# Patient Record
Sex: FEMALE | Race: WHITE | NOT HISPANIC OR LATINO | Employment: OTHER | URBAN - METROPOLITAN AREA
[De-identification: names, ages, dates, MRNs, and addresses within clinical notes are randomized per-mention and may not be internally consistent; named-entity substitution may affect disease eponyms.]

---

## 2018-01-31 ENCOUNTER — OFFICE VISIT CONVERTED (OUTPATIENT)
Dept: FAMILY MEDICINE CLINIC | Facility: CLINIC | Age: 68
End: 2018-01-31
Attending: FAMILY MEDICINE

## 2018-01-31 ENCOUNTER — CONVERSION ENCOUNTER (OUTPATIENT)
Dept: FAMILY MEDICINE CLINIC | Facility: CLINIC | Age: 68
End: 2018-01-31

## 2018-06-25 ENCOUNTER — CONVERSION ENCOUNTER (OUTPATIENT)
Dept: FAMILY MEDICINE CLINIC | Facility: CLINIC | Age: 68
End: 2018-06-25

## 2018-06-25 ENCOUNTER — OFFICE VISIT CONVERTED (OUTPATIENT)
Dept: FAMILY MEDICINE CLINIC | Facility: CLINIC | Age: 68
End: 2018-06-25
Attending: NURSE PRACTITIONER

## 2018-10-04 ENCOUNTER — CONVERSION ENCOUNTER (OUTPATIENT)
Dept: FAMILY MEDICINE CLINIC | Facility: CLINIC | Age: 68
End: 2018-10-04

## 2018-10-04 ENCOUNTER — OFFICE VISIT CONVERTED (OUTPATIENT)
Dept: FAMILY MEDICINE CLINIC | Facility: CLINIC | Age: 68
End: 2018-10-04
Attending: FAMILY MEDICINE

## 2018-11-28 ENCOUNTER — CONVERSION ENCOUNTER (OUTPATIENT)
Dept: FAMILY MEDICINE CLINIC | Facility: CLINIC | Age: 68
End: 2018-11-28

## 2018-11-28 ENCOUNTER — OFFICE VISIT CONVERTED (OUTPATIENT)
Dept: FAMILY MEDICINE CLINIC | Facility: CLINIC | Age: 68
End: 2018-11-28
Attending: FAMILY MEDICINE

## 2019-03-27 ENCOUNTER — CONVERSION ENCOUNTER (OUTPATIENT)
Dept: FAMILY MEDICINE CLINIC | Facility: CLINIC | Age: 69
End: 2019-03-27

## 2019-03-27 ENCOUNTER — HOSPITAL ENCOUNTER (OUTPATIENT)
Dept: FAMILY MEDICINE CLINIC | Facility: CLINIC | Age: 69
Discharge: HOME | End: 2019-03-27
Attending: FAMILY MEDICINE

## 2019-03-27 ENCOUNTER — OFFICE VISIT CONVERTED (OUTPATIENT)
Dept: FAMILY MEDICINE CLINIC | Facility: CLINIC | Age: 69
End: 2019-03-27
Attending: FAMILY MEDICINE

## 2019-03-27 LAB
EST. AVERAGE GLUCOSE BLD GHB EST-MCNC: 214 MG/DL
HBA1C MFR BLD: 9.1 % (ref 3.5–5.7)

## 2019-06-05 ENCOUNTER — HOSPITAL ENCOUNTER (OUTPATIENT)
Dept: SURGERY | Facility: HOSPITAL | Age: 69
Setting detail: HOSPITAL OUTPATIENT SURGERY
Discharge: HOME | End: 2019-06-05
Attending: OPHTHALMOLOGY

## 2019-06-05 LAB — GLUCOSE BLD-MCNC: 216 MG/DL (ref 65–99)

## 2019-07-10 ENCOUNTER — OFFICE VISIT CONVERTED (OUTPATIENT)
Dept: FAMILY MEDICINE CLINIC | Facility: CLINIC | Age: 69
End: 2019-07-10
Attending: FAMILY MEDICINE

## 2019-07-10 ENCOUNTER — CONVERSION ENCOUNTER (OUTPATIENT)
Dept: FAMILY MEDICINE CLINIC | Facility: CLINIC | Age: 69
End: 2019-07-10

## 2019-07-10 ENCOUNTER — HOSPITAL ENCOUNTER (OUTPATIENT)
Dept: FAMILY MEDICINE CLINIC | Facility: CLINIC | Age: 69
Discharge: HOME | End: 2019-07-10
Attending: FAMILY MEDICINE

## 2019-07-10 LAB
ALBUMIN SERPL-MCNC: 4.3 G/DL (ref 3.5–5)
ALBUMIN/GLOB SERPL: 1 {RATIO} (ref 1.4–2.6)
ALP SERPL-CCNC: 129 U/L (ref 43–160)
ALT SERPL-CCNC: 62 U/L (ref 10–40)
ANION GAP SERPL CALC-SCNC: 21 MMOL/L (ref 8–19)
AST SERPL-CCNC: 66 U/L (ref 15–50)
BASOPHILS # BLD AUTO: 0.08 10*3/UL (ref 0–0.2)
BASOPHILS NFR BLD AUTO: 0.9 % (ref 0–3)
BILIRUB SERPL-MCNC: 0.57 MG/DL (ref 0.2–1.3)
BUN SERPL-MCNC: 8 MG/DL (ref 5–25)
BUN/CREAT SERPL: 9 {RATIO} (ref 6–20)
CALCIUM SERPL-MCNC: 9.9 MG/DL (ref 8.7–10.4)
CHLORIDE SERPL-SCNC: 99 MMOL/L (ref 99–111)
CHOLEST SERPL-MCNC: 201 MG/DL (ref 107–200)
CHOLEST/HDLC SERPL: 4.5 {RATIO} (ref 3–6)
CONV ABS IMM GRAN: 0.03 10*3/UL (ref 0–0.2)
CONV CO2: 20 MMOL/L (ref 22–32)
CONV IMMATURE GRAN: 0.3 % (ref 0–1.8)
CONV TOTAL PROTEIN: 8.5 G/DL (ref 6.3–8.2)
CREAT UR-MCNC: 0.94 MG/DL (ref 0.5–0.9)
DEPRECATED RDW RBC AUTO: 43 FL (ref 36.4–46.3)
EOSINOPHIL # BLD AUTO: 0.14 10*3/UL (ref 0–0.7)
EOSINOPHIL # BLD AUTO: 1.6 % (ref 0–7)
ERYTHROCYTE [DISTWIDTH] IN BLOOD BY AUTOMATED COUNT: 13.2 % (ref 11.7–14.4)
EST. AVERAGE GLUCOSE BLD GHB EST-MCNC: 226 MG/DL
GFR SERPLBLD BASED ON 1.73 SQ M-ARVRAT: >60 ML/MIN/{1.73_M2}
GLOBULIN UR ELPH-MCNC: 4.2 G/DL (ref 2–3.5)
GLUCOSE SERPL-MCNC: 214 MG/DL (ref 65–99)
HBA1C MFR BLD: 13.8 G/DL (ref 12–16)
HBA1C MFR BLD: 9.5 % (ref 3.5–5.7)
HCT VFR BLD AUTO: 44.3 % (ref 37–47)
HDLC SERPL-MCNC: 45 MG/DL (ref 40–60)
LDLC SERPL CALC-MCNC: 127 MG/DL (ref 70–100)
LYMPHOCYTES # BLD AUTO: 2.56 10*3/UL (ref 1–5)
MCH RBC QN AUTO: 27.7 PG (ref 27–31)
MCHC RBC AUTO-ENTMCNC: 31.2 G/DL (ref 33–37)
MCV RBC AUTO: 88.8 FL (ref 81–99)
MONOCYTES # BLD AUTO: 0.66 10*3/UL (ref 0.2–1.2)
MONOCYTES NFR BLD AUTO: 7.4 % (ref 3–10)
NEUTROPHILS # BLD AUTO: 5.41 10*3/UL (ref 2–8)
NEUTROPHILS NFR BLD AUTO: 61 % (ref 30–85)
NRBC CBCN: 0 % (ref 0–0.7)
OSMOLALITY SERPL CALC.SUM OF ELEC: 287 MOSM/KG (ref 273–304)
PLATELET # BLD AUTO: 314 10*3/UL (ref 130–400)
PMV BLD AUTO: 12.6 FL (ref 9.4–12.3)
POTASSIUM SERPL-SCNC: 4.2 MMOL/L (ref 3.5–5.3)
RBC # BLD AUTO: 4.99 10*6/UL (ref 4.2–5.4)
SODIUM SERPL-SCNC: 136 MMOL/L (ref 135–147)
TRIGL SERPL-MCNC: 146 MG/DL (ref 40–150)
TSH SERPL-ACNC: 1.63 M[IU]/L (ref 0.27–4.2)
VARIANT LYMPHS NFR BLD MANUAL: 28.8 % (ref 20–45)
VLDLC SERPL-MCNC: 29 MG/DL (ref 5–37)
WBC # BLD AUTO: 8.88 10*3/UL (ref 4.8–10.8)

## 2019-07-31 ENCOUNTER — HOSPITAL ENCOUNTER (OUTPATIENT)
Dept: CARDIOLOGY | Facility: HOSPITAL | Age: 69
Discharge: HOME | End: 2019-07-31
Attending: FAMILY MEDICINE

## 2019-08-02 ENCOUNTER — HOSPITAL ENCOUNTER (OUTPATIENT)
Dept: CT IMAGING | Facility: HOSPITAL | Age: 69
Discharge: HOME | End: 2019-08-02
Attending: FAMILY MEDICINE

## 2019-08-14 ENCOUNTER — CONVERSION ENCOUNTER (OUTPATIENT)
Dept: FAMILY MEDICINE CLINIC | Facility: CLINIC | Age: 69
End: 2019-08-14

## 2019-08-14 ENCOUNTER — OFFICE VISIT CONVERTED (OUTPATIENT)
Dept: FAMILY MEDICINE CLINIC | Facility: CLINIC | Age: 69
End: 2019-08-14
Attending: FAMILY MEDICINE

## 2019-09-23 ENCOUNTER — PATIENT OUTREACH - CONVERTED (OUTPATIENT)
Dept: FAMILY MEDICINE CLINIC | Facility: CLINIC | Age: 69
End: 2019-09-23
Attending: FAMILY MEDICINE

## 2019-10-03 ENCOUNTER — OFFICE VISIT CONVERTED (OUTPATIENT)
Dept: FAMILY MEDICINE CLINIC | Facility: CLINIC | Age: 69
End: 2019-10-03
Attending: FAMILY MEDICINE

## 2019-10-03 ENCOUNTER — CONVERSION ENCOUNTER (OUTPATIENT)
Dept: FAMILY MEDICINE CLINIC | Facility: CLINIC | Age: 69
End: 2019-10-03

## 2019-10-25 ENCOUNTER — PATIENT OUTREACH - CONVERTED (OUTPATIENT)
Dept: FAMILY MEDICINE CLINIC | Facility: CLINIC | Age: 69
End: 2019-10-25
Attending: FAMILY MEDICINE

## 2019-11-20 ENCOUNTER — HOSPITAL ENCOUNTER (OUTPATIENT)
Dept: NEUROLOGY | Facility: HOSPITAL | Age: 69
Discharge: HOME | End: 2019-11-20
Attending: PSYCHIATRY & NEUROLOGY

## 2019-11-26 ENCOUNTER — PATIENT OUTREACH - CONVERTED (OUTPATIENT)
Dept: FAMILY MEDICINE CLINIC | Facility: CLINIC | Age: 69
End: 2019-11-26
Attending: FAMILY MEDICINE

## 2020-01-24 ENCOUNTER — PATIENT OUTREACH - CONVERTED (OUTPATIENT)
Dept: FAMILY MEDICINE CLINIC | Facility: CLINIC | Age: 70
End: 2020-01-24
Attending: FAMILY MEDICINE

## 2020-02-26 ENCOUNTER — HOSPITAL ENCOUNTER (OUTPATIENT)
Dept: FAMILY MEDICINE CLINIC | Facility: CLINIC | Age: 70
Discharge: HOME | End: 2020-02-26
Attending: FAMILY MEDICINE

## 2020-02-26 ENCOUNTER — OFFICE VISIT CONVERTED (OUTPATIENT)
Dept: FAMILY MEDICINE CLINIC | Facility: CLINIC | Age: 70
End: 2020-02-26
Attending: FAMILY MEDICINE

## 2020-02-26 ENCOUNTER — CONVERSION ENCOUNTER (OUTPATIENT)
Dept: FAMILY MEDICINE CLINIC | Facility: CLINIC | Age: 70
End: 2020-02-26

## 2020-02-26 LAB
ALBUMIN SERPL-MCNC: 4 G/DL (ref 3.5–5)
ALBUMIN/GLOB SERPL: 1 {RATIO} (ref 1.4–2.6)
ALP SERPL-CCNC: 120 U/L (ref 43–160)
ALT SERPL-CCNC: 22 U/L (ref 10–40)
ANION GAP SERPL CALC-SCNC: 20 MMOL/L (ref 8–19)
AST SERPL-CCNC: 24 U/L (ref 15–50)
BASOPHILS # BLD AUTO: 0.05 10*3/UL (ref 0–0.2)
BASOPHILS NFR BLD AUTO: 0.7 % (ref 0–3)
BILIRUB SERPL-MCNC: 0.51 MG/DL (ref 0.2–1.3)
BUN SERPL-MCNC: 9 MG/DL (ref 5–25)
BUN/CREAT SERPL: 10 {RATIO} (ref 6–20)
CALCIUM SERPL-MCNC: 9.7 MG/DL (ref 8.7–10.4)
CHLORIDE SERPL-SCNC: 101 MMOL/L (ref 99–111)
CHOLEST SERPL-MCNC: 205 MG/DL (ref 107–200)
CHOLEST/HDLC SERPL: 4 {RATIO} (ref 3–6)
CONV ABS IMM GRAN: 0.02 10*3/UL (ref 0–0.2)
CONV CO2: 23 MMOL/L (ref 22–32)
CONV IMMATURE GRAN: 0.3 % (ref 0–1.8)
CONV TOTAL PROTEIN: 7.9 G/DL (ref 6.3–8.2)
CREAT UR-MCNC: 0.93 MG/DL (ref 0.5–0.9)
DEPRECATED RDW RBC AUTO: 45.3 FL (ref 36.4–46.3)
EOSINOPHIL # BLD AUTO: 0.07 10*3/UL (ref 0–0.7)
EOSINOPHIL # BLD AUTO: 1 % (ref 0–7)
ERYTHROCYTE [DISTWIDTH] IN BLOOD BY AUTOMATED COUNT: 14.1 % (ref 11.7–14.4)
EST. AVERAGE GLUCOSE BLD GHB EST-MCNC: 220 MG/DL
GFR SERPLBLD BASED ON 1.73 SQ M-ARVRAT: >60 ML/MIN/{1.73_M2}
GLOBULIN UR ELPH-MCNC: 3.9 G/DL (ref 2–3.5)
GLUCOSE SERPL-MCNC: 251 MG/DL (ref 65–99)
HBA1C MFR BLD: 9.3 % (ref 3.5–5.7)
HCT VFR BLD AUTO: 44.1 % (ref 37–47)
HDLC SERPL-MCNC: 51 MG/DL (ref 40–60)
HGB BLD-MCNC: 13.6 G/DL (ref 12–16)
LDLC SERPL CALC-MCNC: 129 MG/DL (ref 70–100)
LYMPHOCYTES # BLD AUTO: 1.92 10*3/UL (ref 1–5)
LYMPHOCYTES NFR BLD AUTO: 27.2 % (ref 20–45)
MCH RBC QN AUTO: 27.5 PG (ref 27–31)
MCHC RBC AUTO-ENTMCNC: 30.8 G/DL (ref 33–37)
MCV RBC AUTO: 89.1 FL (ref 81–99)
MONOCYTES # BLD AUTO: 0.48 10*3/UL (ref 0.2–1.2)
MONOCYTES NFR BLD AUTO: 6.8 % (ref 3–10)
NEUTROPHILS # BLD AUTO: 4.52 10*3/UL (ref 2–8)
NEUTROPHILS NFR BLD AUTO: 64 % (ref 30–85)
NRBC CBCN: 0 % (ref 0–0.7)
OSMOLALITY SERPL CALC.SUM OF ELEC: 295 MOSM/KG (ref 273–304)
PLATELET # BLD AUTO: 274 10*3/UL (ref 130–400)
PMV BLD AUTO: 11.9 FL (ref 9.4–12.3)
POTASSIUM SERPL-SCNC: 5 MMOL/L (ref 3.5–5.3)
RBC # BLD AUTO: 4.95 10*6/UL (ref 4.2–5.4)
SODIUM SERPL-SCNC: 139 MMOL/L (ref 135–147)
TRIGL SERPL-MCNC: 126 MG/DL (ref 40–150)
TSH SERPL-ACNC: 2.24 M[IU]/L (ref 0.27–4.2)
VLDLC SERPL-MCNC: 25 MG/DL (ref 5–37)
WBC # BLD AUTO: 7.06 10*3/UL (ref 4.8–10.8)

## 2020-02-27 ENCOUNTER — PATIENT OUTREACH - CONVERTED (OUTPATIENT)
Dept: FAMILY MEDICINE CLINIC | Facility: CLINIC | Age: 70
End: 2020-02-27
Attending: FAMILY MEDICINE

## 2020-02-27 LAB
CONV HEPATITIS C AB WITH REFLEX TO CONFIRMATION: <0.1 S/CO RATIO (ref 0–0.9)
CONV HEPATITIS COMMENT: NORMAL

## 2020-06-08 ENCOUNTER — HOSPITAL ENCOUNTER (OUTPATIENT)
Dept: FAMILY MEDICINE CLINIC | Facility: CLINIC | Age: 70
Discharge: HOME | End: 2020-06-08
Attending: FAMILY MEDICINE

## 2020-06-09 LAB
EST. AVERAGE GLUCOSE BLD GHB EST-MCNC: 229 MG/DL
HBA1C MFR BLD: 9.6 % (ref 3.5–5.7)

## 2020-06-25 ENCOUNTER — PATIENT OUTREACH - CONVERTED (OUTPATIENT)
Dept: FAMILY MEDICINE CLINIC | Facility: CLINIC | Age: 70
End: 2020-06-25
Attending: FAMILY MEDICINE

## 2020-07-20 LAB — SARS-COV-2 RNA SPEC QL NAA+PROBE: NOT DETECTED

## 2020-07-22 ENCOUNTER — HOSPITAL ENCOUNTER (OUTPATIENT)
Dept: PERIOP | Facility: HOSPITAL | Age: 70
Setting detail: HOSPITAL OUTPATIENT SURGERY
Discharge: HOME | End: 2020-07-22
Attending: OPHTHALMOLOGY

## 2020-07-22 LAB — GLUCOSE BLD-MCNC: 198 MG/DL (ref 65–99)

## 2020-07-23 ENCOUNTER — PATIENT OUTREACH - CONVERTED (OUTPATIENT)
Dept: FAMILY MEDICINE CLINIC | Facility: CLINIC | Age: 70
End: 2020-07-23
Attending: FAMILY MEDICINE

## 2020-08-10 ENCOUNTER — HOSPITAL ENCOUNTER (OUTPATIENT)
Dept: FAMILY MEDICINE CLINIC | Facility: CLINIC | Age: 70
Discharge: HOME | End: 2020-08-10
Attending: FAMILY MEDICINE

## 2020-08-12 LAB
AMOXICILLIN+CLAV SUSC ISLT: <=2
AMPICILLIN SUSC ISLT: <=2
AMPICILLIN+SULBAC SUSC ISLT: <=2
BACTERIA UR CULT: ABNORMAL
CEFAZOLIN SUSC ISLT: <=4
CEFEPIME SUSC ISLT: <=1
CEFTAZIDIME SUSC ISLT: <=1
CEFTRIAXONE SUSC ISLT: <=1
CEFUROXIME ORAL SUSC ISLT: 4
CEFUROXIME PARENTER SUSC ISLT: 4
CIPROFLOXACIN SUSC ISLT: <=0.25
ERTAPENEM SUSC ISLT: <=0.5
GENTAMICIN SUSC ISLT: <=1
LEVOFLOXACIN SUSC ISLT: <=0.12
NITROFURANTOIN SUSC ISLT: <=16
TETRACYCLINE SUSC ISLT: <=1
TMP SMX SUSC ISLT: <=20
TOBRAMYCIN SUSC ISLT: <=1

## 2020-08-17 ENCOUNTER — CONVERSION ENCOUNTER (OUTPATIENT)
Dept: FAMILY MEDICINE CLINIC | Facility: CLINIC | Age: 70
End: 2020-08-17

## 2020-08-17 ENCOUNTER — OFFICE VISIT CONVERTED (OUTPATIENT)
Dept: FAMILY MEDICINE CLINIC | Facility: CLINIC | Age: 70
End: 2020-08-17
Attending: FAMILY MEDICINE

## 2020-08-24 ENCOUNTER — HOSPITAL ENCOUNTER (OUTPATIENT)
Dept: FAMILY MEDICINE CLINIC | Facility: CLINIC | Age: 70
Discharge: HOME | End: 2020-08-24
Attending: FAMILY MEDICINE

## 2020-08-25 LAB — BACTERIA UR CULT: NORMAL

## 2020-08-26 ENCOUNTER — PATIENT OUTREACH - CONVERTED (OUTPATIENT)
Dept: FAMILY MEDICINE CLINIC | Facility: CLINIC | Age: 70
End: 2020-08-26
Attending: FAMILY MEDICINE

## 2020-08-31 ENCOUNTER — HOSPITAL ENCOUNTER (OUTPATIENT)
Dept: LAB | Facility: HOSPITAL | Age: 70
Discharge: HOME | End: 2020-08-31
Attending: FAMILY MEDICINE

## 2020-08-31 LAB
EST. AVERAGE GLUCOSE BLD GHB EST-MCNC: 169 MG/DL
HBA1C MFR BLD: 7.5 % (ref 3.5–5.7)

## 2020-09-30 ENCOUNTER — PATIENT OUTREACH - CONVERTED (OUTPATIENT)
Dept: FAMILY MEDICINE CLINIC | Facility: CLINIC | Age: 70
End: 2020-09-30
Attending: FAMILY MEDICINE

## 2020-11-24 ENCOUNTER — PATIENT OUTREACH - CONVERTED (OUTPATIENT)
Dept: FAMILY MEDICINE CLINIC | Facility: CLINIC | Age: 70
End: 2020-11-24
Attending: FAMILY MEDICINE

## 2021-02-26 ENCOUNTER — PATIENT OUTREACH - CONVERTED (OUTPATIENT)
Dept: FAMILY MEDICINE CLINIC | Facility: CLINIC | Age: 71
End: 2021-02-26
Attending: FAMILY MEDICINE

## 2021-03-15 ENCOUNTER — OFFICE VISIT CONVERTED (OUTPATIENT)
Dept: FAMILY MEDICINE CLINIC | Facility: CLINIC | Age: 71
End: 2021-03-15
Attending: FAMILY MEDICINE

## 2021-03-15 ENCOUNTER — HOSPITAL ENCOUNTER (OUTPATIENT)
Dept: FAMILY MEDICINE CLINIC | Facility: CLINIC | Age: 71
Discharge: HOME | End: 2021-03-15
Attending: FAMILY MEDICINE

## 2021-03-15 ENCOUNTER — CONVERSION ENCOUNTER (OUTPATIENT)
Dept: FAMILY MEDICINE CLINIC | Facility: CLINIC | Age: 71
End: 2021-03-15

## 2021-03-15 ENCOUNTER — HOSPITAL ENCOUNTER (OUTPATIENT)
Dept: LAB | Facility: HOSPITAL | Age: 71
Discharge: HOME | End: 2021-03-15
Attending: FAMILY MEDICINE

## 2021-03-15 LAB
ALBUMIN SERPL-MCNC: 4 G/DL (ref 3.5–5)
ALBUMIN/GLOB SERPL: 1.2 {RATIO} (ref 1.4–2.6)
ALP SERPL-CCNC: 105 U/L (ref 43–160)
ALT SERPL-CCNC: 16 U/L (ref 10–40)
ANION GAP SERPL CALC-SCNC: 20 MMOL/L (ref 8–19)
AST SERPL-CCNC: 20 U/L (ref 15–50)
BASOPHILS # BLD AUTO: 0.04 10*3/UL (ref 0–0.2)
BASOPHILS NFR BLD AUTO: 0.6 % (ref 0–3)
BILIRUB SERPL-MCNC: 0.39 MG/DL (ref 0.2–1.3)
BUN SERPL-MCNC: 13 MG/DL (ref 5–25)
BUN/CREAT SERPL: 12 {RATIO} (ref 6–20)
CALCIUM SERPL-MCNC: 9.1 MG/DL (ref 8.7–10.4)
CHLORIDE SERPL-SCNC: 101 MMOL/L (ref 99–111)
CHOLEST SERPL-MCNC: 173 MG/DL (ref 107–200)
CHOLEST/HDLC SERPL: 3.5 {RATIO} (ref 3–6)
CONV ABS IMM GRAN: 0.02 10*3/UL (ref 0–0.2)
CONV CO2: 19 MMOL/L (ref 22–32)
CONV CREATININE URINE, RANDOM: 90.1 MG/DL (ref 10–300)
CONV IMMATURE GRAN: 0.3 % (ref 0–1.8)
CONV MICROALBUM.,U,RANDOM: 55.9 MG/L (ref 0–20)
CONV TOTAL PROTEIN: 7.4 G/DL (ref 6.3–8.2)
CREAT UR-MCNC: 1.05 MG/DL (ref 0.5–0.9)
DEPRECATED RDW RBC AUTO: 43.9 FL (ref 36.4–46.3)
EOSINOPHIL # BLD AUTO: 0.07 10*3/UL (ref 0–0.7)
EOSINOPHIL # BLD AUTO: 1.1 % (ref 0–7)
ERYTHROCYTE [DISTWIDTH] IN BLOOD BY AUTOMATED COUNT: 13.6 % (ref 11.7–14.4)
EST. AVERAGE GLUCOSE BLD GHB EST-MCNC: 192 MG/DL
GFR SERPLBLD BASED ON 1.73 SQ M-ARVRAT: 53 ML/MIN/{1.73_M2}
GLOBULIN UR ELPH-MCNC: 3.4 G/DL (ref 2–3.5)
GLUCOSE SERPL-MCNC: 242 MG/DL (ref 65–99)
HBA1C MFR BLD: 8.3 % (ref 3.5–5.7)
HCT VFR BLD AUTO: 38 % (ref 37–47)
HDLC SERPL-MCNC: 49 MG/DL (ref 40–60)
HGB BLD-MCNC: 12.1 G/DL (ref 12–16)
LDLC SERPL CALC-MCNC: 100 MG/DL (ref 70–100)
LYMPHOCYTES # BLD AUTO: 1.64 10*3/UL (ref 1–5)
LYMPHOCYTES NFR BLD AUTO: 25.1 % (ref 20–45)
MCH RBC QN AUTO: 28.1 PG (ref 27–31)
MCHC RBC AUTO-ENTMCNC: 31.8 G/DL (ref 33–37)
MCV RBC AUTO: 88.4 FL (ref 81–99)
MICROALBUMIN/CREAT UR: 62 MG/G{CRE} (ref 0–35)
MONOCYTES # BLD AUTO: 0.44 10*3/UL (ref 0.2–1.2)
MONOCYTES NFR BLD AUTO: 6.7 % (ref 3–10)
NEUTROPHILS # BLD AUTO: 4.33 10*3/UL (ref 2–8)
NEUTROPHILS NFR BLD AUTO: 66.2 % (ref 30–85)
NRBC CBCN: 0 % (ref 0–0.7)
OSMOLALITY SERPL CALC.SUM OF ELEC: 290 MOSM/KG (ref 273–304)
PLATELET # BLD AUTO: 259 10*3/UL (ref 130–400)
PMV BLD AUTO: 12 FL (ref 9.4–12.3)
POTASSIUM SERPL-SCNC: 4.1 MMOL/L (ref 3.5–5.3)
RBC # BLD AUTO: 4.3 10*6/UL (ref 4.2–5.4)
SODIUM SERPL-SCNC: 136 MMOL/L (ref 135–147)
TRIGL SERPL-MCNC: 121 MG/DL (ref 40–150)
TSH SERPL-ACNC: 2.76 M[IU]/L (ref 0.27–4.2)
VLDLC SERPL-MCNC: 24 MG/DL (ref 5–37)
WBC # BLD AUTO: 6.54 10*3/UL (ref 4.8–10.8)

## 2021-03-30 ENCOUNTER — PATIENT OUTREACH - CONVERTED (OUTPATIENT)
Dept: FAMILY MEDICINE CLINIC | Facility: CLINIC | Age: 71
End: 2021-03-30
Attending: FAMILY MEDICINE

## 2021-04-14 ENCOUNTER — CONVERSION ENCOUNTER (OUTPATIENT)
Dept: FAMILY MEDICINE CLINIC | Facility: CLINIC | Age: 71
End: 2021-04-14

## 2021-04-14 ENCOUNTER — OFFICE VISIT CONVERTED (OUTPATIENT)
Dept: FAMILY MEDICINE CLINIC | Facility: CLINIC | Age: 71
End: 2021-04-14
Attending: FAMILY MEDICINE

## 2021-04-14 ENCOUNTER — HOSPITAL ENCOUNTER (OUTPATIENT)
Dept: FAMILY MEDICINE CLINIC | Facility: CLINIC | Age: 71
Discharge: HOME | End: 2021-04-14
Attending: FAMILY MEDICINE

## 2021-04-14 LAB
BILIRUB UR QL STRIP: NEGATIVE
COLOR UR: YELLOW
CONV CLARITY OF URINE: CLEAR
CONV PROTEIN IN URINE BY AUTOMATED TEST STRIP: NORMAL
CONV UROBILINOGEN IN URINE BY AUTOMATED TEST STRIP: NORMAL
GLUCOSE UR QL: NEGATIVE
HGB UR QL STRIP: NORMAL
KETONES UR QL STRIP: NEGATIVE
LEUKOCYTE ESTERASE UR QL STRIP: NORMAL
NITRITE UR QL STRIP: POSITIVE
PH UR STRIP.AUTO: 5.5 [PH]
SP GR UR: 1.01

## 2021-04-16 LAB
AMPICILLIN SUSC ISLT: <=2
AMPICILLIN+SULBAC SUSC ISLT: <=2
BACTERIA UR CULT: ABNORMAL
CEFAZOLIN SUSC ISLT: <=4
CEFEPIME SUSC ISLT: <=0.12
CEFTAZIDIME SUSC ISLT: <=1
CEFTRIAXONE SUSC ISLT: <=0.25
CIPROFLOXACIN SUSC ISLT: <=0.25
ERTAPENEM SUSC ISLT: <=0.12
GENTAMICIN SUSC ISLT: <=1
LEVOFLOXACIN SUSC ISLT: <=0.12
NITROFURANTOIN SUSC ISLT: <=16
PIP+TAZO SUSC ISLT: <=4
TMP SMX SUSC ISLT: <=20
TOBRAMYCIN SUSC ISLT: <=1

## 2021-05-10 ENCOUNTER — CONVERSION ENCOUNTER (OUTPATIENT)
Dept: FAMILY MEDICINE CLINIC | Facility: CLINIC | Age: 71
End: 2021-05-10

## 2021-05-10 ENCOUNTER — HOSPITAL ENCOUNTER (OUTPATIENT)
Dept: FAMILY MEDICINE CLINIC | Facility: CLINIC | Age: 71
Discharge: HOME | End: 2021-05-10
Attending: FAMILY MEDICINE

## 2021-05-10 ENCOUNTER — OFFICE VISIT CONVERTED (OUTPATIENT)
Dept: FAMILY MEDICINE CLINIC | Facility: CLINIC | Age: 71
End: 2021-05-10
Attending: FAMILY MEDICINE

## 2021-05-10 LAB
BILIRUB UR QL STRIP: NEGATIVE
COLOR UR: YELLOW
CONV CLARITY OF URINE: NORMAL
CONV PROTEIN IN URINE BY AUTOMATED TEST STRIP: NORMAL
CONV UROBILINOGEN IN URINE BY AUTOMATED TEST STRIP: NORMAL
GLUCOSE UR QL: NORMAL
HGB UR QL STRIP: NORMAL
KETONES UR QL STRIP: NEGATIVE
LEUKOCYTE ESTERASE UR QL STRIP: NORMAL
NITRITE UR QL STRIP: POSITIVE
PH UR STRIP.AUTO: 5.5 [PH]
SP GR UR: 1.01

## 2021-05-10 NOTE — OUTREACH NOTE
"   Quick Note      Patient Name: Charis Hill   Patient ID: [id number]   Sex: Female   YOB: 1950    Primary Care Provider: Redd Magdaleno III MD   Referring Provider: Redd Magdaleno III MD    Visit Date: November 24, 2020    Provider: Redd Magdaleno III MD   Location: [facility]   Location Address: [address] [place], [place] [address]   Location Phone: [phone]          History Of Present Illness  CCM Comprehensive Care Plan    This Chronic Medical Management Care Plan for Charis Hill, 70 year old /White female, has been monitored and managed for the month of November. A cumulative time of 20 minutes was spent on this patient record, including phone call with patient, electronic communication with pharmacist, and chart review.   Regarding the patient's diagnoses Advance directive declined by patient, Aphagia, Hyperlipidemia, Hypertension, Insulin dependent type 2 diabetes mellitus, uncontrolled, Medication management, and Refused pneumococcal vaccination, the following items were adressed: medical records and medications and any changes can be found within the plan section of the note. A detailed listing of time spent for chronic care management has been scanned into the patient's electronic record. Current medications include: amlodipine 10 mg oral tablet, aspirin 81 mg oral tablet,delayed release (DR/EC), benazepril 40 mg oral tablet, Cardura 4 mg oral tablet, clonidine HCl 0.1 mg oral tablet, doxazosin 4 mg oral tablet, Humalog KwikPen Insulin 100 unit/mL subcutaneous insulin pen, Lantus Solostar U-100 Insulin 100 unit/mL (3 mL) subcutaneous insulin pen, metformin 500 mg oral tablet extended release 24 hr, metoprolol succinate 25 mg oral tablet extended release 24 hr, omeprazole 40 mg oral capsule,delayed release(DR/EC), Ozempic 1 mg/dose (2 mg/1.5 mL) subcutaneous pen injector, and Pen Needle 31 gauge x 1/4\" miscellaneous needle and per " the patient she is compliant with medication protocol. She needs frequent medication management as she does not always take her medications as ordered. Medications are reported to be effective. Regarding these diagnoses, referrals were made to the following provider/s --none.   The patient was monitored remotely for blood pressure, blood glucose, medication management and medication refills, covid precautions. for a period of 6 minutes.   This patient's physical needs are currently being met.   Patient's mental support needs are currently being met.   The patient's cognitive support needs are currently being met.   The patient's psychosocial support needs are N/A,   This patient's functional needs are N/A. Ms Hill does not require any assistance with ambulation. She continues to work at [employer]. She does have notable aphagia and her needs may change if this worsens.   This patient's environmental needs are N/A. Ms Hill lives alone in her home.           Assessment  · Chronic Care Management (CCM)     V68.89/Z02.89  · Aphagia     787.20/R13.0  · Insulin dependent type 2 diabetes mellitus, uncontrolled       Type 2 diabetes mellitus with hyperglycemia     250.02/E11.65  Long term (current) use of insulin     250.02/Z79.4  · Medication management     V58.69/Z79.899  · Refused pneumococcal vaccination     V64.06/Z28.21  · Hyperlipidemia     272.4/E78.5  · Hypertension     401.9/I10      Plan  · Instructions  o Patient's Health Care Goals: __stay active and able to work. I encouraged need to adhere to diabetic diet and medications as ordered._________________  o Provider's Health Care Goals: ____________________  o Patient was provided an electronic copy of care plan  o CCM services were explained and offered and patient has accepted these services.  o Patient has given their written consent to recieve CCM services and understands that this includes the authorization of electronic communication of  medical information with other treating providers.  o Patient understands that they may stop CCM services at any time and these changes will be effective at the end of the calendar month and will effectively revocate the agreement of CCM services.  o Patient understands that only one practioner can furnish and be paid for CCM services during one calendar month. Patient also understands that there may be co-payment or deductible fees in association with CCM services.  o Patient will continue with at least monthly follow-up calls with the Nurse Navigator.  · Associate Tasks  o Task ID [id number] CCM: CCM            Electronically Signed by: Sachi Reynolds RN -Author on November 24, 2020 09:55:51 AM

## 2021-05-10 NOTE — OUTREACH NOTE
"   Quick Note      Patient Name: Charis Hill   Patient ID: [id number]   Sex: Female   YOB: 1950    Primary Care Provider: Redd Magdaleno III MD   Referring Provider: Redd Magdaleno III MD    Visit Date: September 30, 2020    Provider: Redd Magdaleno III MD   Location: [facility]   Location Address: [address] [place], [place] [address]   Location Phone: [phone]          History Of Present Illness  CCM Comprehensive Care Plan    This Chronic Medical Management Care Plan for Charis Hill, 70 year old /White female, has been monitored and managed for the month of September. A cumulative time of 20 minutes was spent on this patient record, including phone call with patient and chart review.   Regarding the patient's diagnoses Aphagia, Hyperlipidemia, Hypertension, Insulin dependent type 2 diabetes mellitus, uncontrolled, Medication management, and Refused pneumococcal vaccination, the following items were adressed: medical records and medications and any changes can be found within the plan section of the note. A detailed listing of time spent for chronic care management has been scanned into the patient's electronic record. Current medications include: amlodipine 10 mg oral tablet, aspirin 81 mg oral tablet,delayed release (DR/EC), benazepril 40 mg oral tablet, Cardura 4 mg oral tablet, clonidine HCl 0.1 mg oral tablet, doxazosin 4 mg oral tablet, Humalog KwikPen Insulin 100 unit/mL subcutaneous insulin pen, Lantus Solostar U-100 Insulin 100 unit/mL (3 mL) subcutaneous insulin pen, metformin 500 mg oral tablet extended release 24 hr, metoprolol succinate 25 mg oral tablet extended release 24 hr, omeprazole 40 mg oral capsule,delayed release(DR/EC), Ozempic 1 mg/dose (2 mg/1.5 mL) subcutaneous pen injector, and Pen Needle 31 gauge x 1/4\" miscellaneous needle and the patient reports she is compliant with medication protocol. I again encouraged " importance of medication compliance and the effect it could have on her health. Medications are reported to be effective. A1C had decreased to 7.5%. Regarding these diagnoses, referrals were made to the following provider/s -- none   The patient was monitored remotely for blood pressure, blood glucose, and diabetic diet, medication compliance for a period of 7 minutes.   This patient's physical needs are currently being met.   Patient's mental support needs are currently being met.   The patient's cognitive support needs are currently being met.   The patient's psychosocial support needs are N/A. Ms Hill works at [employer] ,   This patient's functional needs are N/A. Ms Hill is able to perform her own personal care, ADL's, meals, medications and transportation.   This patient's environmental needs are N/A. and Ms Hill lives alone in her home.           Assessment  · Chronic Care Management (CCM)     V68.89/Z02.89  · Advance directive declined by patient     V49.89/Z78.9  · Aphagia     787.20/R13.0  · Insulin dependent type 2 diabetes mellitus, uncontrolled       Type 2 diabetes mellitus with hyperglycemia     250.02/E11.65  Long term (current) use of insulin     250.02/Z79.4  · Medication management     V58.69/Z79.899  · Refused pneumococcal vaccination     V64.06/Z28.21  · Hyperlipidemia     272.4/E78.5  · Hypertension     401.9/I10      Plan  · Medications  o Medications have been Reconciled  o Transition of Care or Provider Policy  · Instructions  o Patient's Health Care Goals: _keep blood sugar and blood pressure controlled as evidenced by fbs  and b/p 120-130/70-80. Take all medications as ordered. Observe diabetic/low salt/low fat diet. Decrease fast foods. ____________________  o Provider's Health Care Goals: ____________________  o Patient was provided an electronic copy of care plan  o CCM services were explained and offered and patient has accepted these services.  o Patient has given their  written consent to recieve CCM services and understands that this includes the authorization of electronic communication of medical information with other treating providers.  o Patient understands that they may stop CCM services at any time and these changes will be effective at the end of the calendar month and will effectively revocate the agreement of CCM services.  o Patient understands that only one practioner can furnish and be paid for CCM services during one calendar month. Patient also understands that there may be co-payment or deductible fees in association with CCM services.  o Patient will continue with at least monthly follow-up calls with the Nurse Navigator.  · Associate Tasks  o Task ID [id number] CCM: CCM            Electronically Signed by: Sachi Reynolds RN -Author on September 30, 2020 11:35:14 AM

## 2021-05-10 NOTE — OUTREACH NOTE
Quick Note      Patient Name: Charis Hill   Patient ID: [id number]   Sex: Female   YOB: 1950    Primary Care Provider: Redd Magdaleno III MD   Referring Provider: Redd Magdaleno III MD    Visit Date: July 23, 2020    Provider: Redd Magdaleno III MD   Location: [facility]   Location Address: [address] [place] [address]  [id number]   Location Phone: [phone]          History Of Present Illness  CCM Comprehensive Care Plan    This Chronic Medical Management Care Plan for Charis Hill, 70 year old /White female, has been monitored and managed for the month of July. A cumulative time of 20 minutes was spent on this patient record, including phone call with patient and chart review.   Regarding the patient's diagnoses Aphagia, Hyperlipidemia, Hypertension, Insulin dependent type 2 diabetes mellitus, uncontrolled, Medication management, Refused pneumococcal vaccination, and Aphagia, Diabetes Mellitus, Type II, Hyperlipidemia, Hypertension, Medication management, and Refused pneumococcal vaccination, the following items were adressed: medical records and medications and any changes can be found within the plan section of the note. A detailed listing of time spent for chronic care management has been scanned into the patient's electronic record. Current medications include: amlodipine 5 mg oral tablet, aspirin 81 mg oral tablet,delayed release (DR/EC), benazepril 40 mg oral tablet, Cardura 4 mg oral tablet, chlorthalidone 25 mg oral tablet, clonidine HCl 0.1 mg oral tablet, doxazosin 4 mg oral tablet, Humalog KwikPen Insulin 100 unit/mL subcutaneous insulin pen, Lantus Solostar U-100 Insulin 100 unit/mL (3 mL) subcutaneous insulin pen, metformin 500 mg oral tablet extended release 24 hr, metoprolol succinate 25 mg oral tablet extended release 24 hr, omeprazole 40 mg oral capsule,delayed release(DR/EC), Ozempic 1 mg/dose (2 mg/1.5 mL) subcutaneous pen injector, and  "Pen Needle 31 gauge x 1/4\" miscellaneous needle and Ms Hill reports she is compliant with medication protocol. Medications are reported to be effective. Regarding these diagnoses, referrals were made to the following provider/s Redd Magdaleno III, MD.   The patient was monitored remotely for blood glucose and paperwork for patient assistance (insulins) for a period of 9 minutes.   This patient's physical needs are currently being met.   Patient's mental support needs are currently being met.   The patient's cognitive support needs are currently being met. Ms Hill is able to answer all questions regarding her medications. She continues to stutter and at times has trouble forming her words. She is followed by Dr Betts for dysphagia.   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   This patient's environmental needs are N/A. and Ms Hill lives alone in her home.           Assessment  · Chronic Care Management (CCM)     V68.89/Z02.89  · Advance directive declined by patient     V49.89/Z78.9  · Aphagia     787.20/R13.0  · Insulin dependent type 2 diabetes mellitus, uncontrolled       Type 2 diabetes mellitus with hyperglycemia     250.02/E11.65  Long term (current) use of insulin     250.02/Z79.4  · Medication management     V58.69/Z79.899  · Refused pneumococcal vaccination     V64.06/Z28.21  · Hyperlipidemia     272.4/E78.5  · Hypertension     401.9/I10      Plan  · Instructions  o Patient's Health Care Goals: __able to keep active. Ms Hill is to adhere to po and insulin regime. Log blood sugar and blood pressure daily with ideal blood glucose to be  and blood pressure 120-130/80___________________  o Provider's Health Care Goals: ____________________  o Patient was provided an electronic copy of care plan  o CCM services were explained and offered and patient has accepted these " services.  o Patient has given their written consent to recieve CCM services and understands that this includes the authorization of electronic communication of medical information with other treating providers.  o Patient understands that they may stop CCM services at any time and these changes will be effective at the end of the calendar month and will effectively revocate the agreement of CCM services.  o Patient understands that only one practioner can furnish and be paid for CCM services during one calendar month. Patient also understands that there may be co-payment or deductible fees in association with CCM services.  o Patient will continue with at least monthly follow-up calls with the Nurse Navigator.  · Associate Tasks  o Task ID [id number] CCM: CCM            Electronically Signed by: Sachi Reynolds RN -Author on July 23, 2020 08:53:33 AM

## 2021-05-10 NOTE — OUTREACH NOTE
"   Quick Note      Patient Name: Charis Hill   Patient ID: [id number]   Sex: Female   YOB: 1950    Primary Care Provider: Redd Magdaleno III MD   Referring Provider: Redd Magdaleno III MD    Visit Date: February 26, 2021    Provider: Redd Magdaleno III MD   Location: [facility]   Location Address: [address] [place], [place] [address]   Location Phone: [phone]          History Of Present Illness  CCM Comprehensive Care Plan    This Chronic Medical Management Care Plan for Charis Hill, 70 year old /White female, has been monitored and managed for the month of February. A cumulative time of 20 minutes was spent on this patient record, including phone call with patient and chart review.   Regarding the patient's diagnoses Advance directive declined by patient, Aphagia, Diabetes Mellitus, Type II, Hyperlipidemia, Hypertension, Insulin dependent type 2 diabetes mellitus, uncontrolled, Medication management, and Refused pneumococcal vaccination, the following items were adressed: medical records and medications and any changes can be found within the plan section of the note. A detailed listing of time spent for chronic care management has been scanned into the patient's electronic record. Current medications include: amlodipine 10 mg oral tablet, aspirin 81 mg oral tablet,delayed release (DR/EC), benazepril 40 mg oral tablet, Cardura 4 mg oral tablet, clonidine HCl 0.1 mg oral tablet, doxazosin 4 mg oral tablet, Humalog KwikPen Insulin 100 unit/mL subcutaneous insulin pen, Lantus Solostar U-100 Insulin 100 unit/mL (3 mL) subcutaneous insulin pen, metformin 500 mg oral tablet extended release 24 hr, metoprolol succinate 25 mg oral tablet extended release 24 hr, omeprazole 40 mg oral capsule,delayed release(DR/EC), Ozempic 1 mg/dose (2 mg/1.5 mL) subcutaneous pen injector, and Pen Needle 31 gauge x 1/4\" miscellaneous needle and the patient is " reported to be non-compliant and Ms Hill has been instructed on the importance to adhere to medication regime she declines to take her medications as ordered. She has f/u with PCP 03/15/2021 and will further discuss.. Medications are reported to be non-effective in controlling symptoms due to non compliance in taking medications as instructed. Regarding these diagnoses, referrals were made to the following provider/s --keep scheduled appointments.   The patient was monitored remotely for blood glucose and medication compliance, follow up appointment made with PCP for a period of 9 minutes.   This patient's physical needs are currently being met.   Patient's mental support needs are currently being met.   The patient's cognitive support needs are currently being met.   The patient's psychosocial support needs are N/A,   This patient's functional needs are N/A. Ms Hill has notable aphagia and has trouble forming her words.   This patient's environmental needs are N/A.           Assessment  · Chronic Care Management (CCM)     V68.89/Z02.89  · Advance directive declined by patient     V49.89/Z78.9  · Aphagia     787.20/R13.0  · Insulin dependent type 2 diabetes mellitus, uncontrolled       Type 2 diabetes mellitus with hyperglycemia     250.02/E11.65  Long term (current) use of insulin     250.02/Z79.4  · Medication management     V58.69/Z79.899  · Refused pneumococcal vaccination     V64.06/Z28.21  · Diabetes Mellitus, Type II     250.00/E11.9  · Hyperlipidemia     272.4/E78.5  · Hypertension     401.9/I10      Plan  · Medications  o Medications have been Reconciled  o Transition of Care or Provider Policy  · Instructions  o Patient's Health Care Goals: _make appointment with PCP and get labs done.____________________  o Provider's Health Care Goals: ____________________  o Patient was provided an electronic copy of care plan  o CCM services were explained and offered and patient has accepted these  services.  o Patient has given their written consent to recieve CCM services and understands that this includes the authorization of electronic communication of medical information with other treating providers.  o Patient understands that they may stop CCM services at any time and these changes will be effective at the end of the calendar month and will effectively revocate the agreement of CCM services.  o Patient understands that only one practioner can furnish and be paid for CCM services during one calendar month. Patient also understands that there may be co-payment or deductible fees in association with CCM services.  o Patient will continue with at least monthly follow-up calls with the Nurse Navigator.  · Associate Tasks  o Task ID [id number] CCM: CCM            Electronically Signed by: Sachi Reynolds RN -Author on February 26, 2021 08:27:16 AM

## 2021-05-10 NOTE — OUTREACH NOTE
"   Quick Note      Patient Name: Charis Hill   Patient ID: [id number]   Sex: Female   YOB: 1950    Primary Care Provider: Redd Magdaleno III MD   Referring Provider: Redd Magdaleno III MD    Visit Date: March 30, 2021    Provider: Redd Magdaleno III MD   Location: [facility] [place]   Location Address: [address] [place], [place] [address]   Location Phone: [phone]          History Of Present Illness  CCM Comprehensive Care Plan    This Chronic Medical Management Care Plan for Charis iHll, 70 year old /White female, has been monitored and managed for the month of March. A cumulative time of 20 minutes was spent on this patient record, including phone call with patient and chart review.   Regarding the patient's diagnoses Advance directive declined by patient, Aphagia, Diabetes Mellitus, Type II, Hyperlipidemia, Hypertension, Insulin dependent type 2 diabetes mellitus, uncontrolled, Medication management, and Refused pneumococcal vaccination, the following items were adressed: medical records and medications and any changes can be found within the plan section of the note. A detailed listing of time spent for chronic care management has been scanned into the patient's electronic record. Current medications include: amlodipine 10 mg oral tablet, aspirin 81 mg oral tablet,delayed release (DR/EC), benazepril 40 mg oral tablet, clonidine HCl 0.1 mg oral tablet, doxazosin 4 mg oral tablet, Humalog KwikPen Insulin 100 unit/mL subcutaneous insulin pen, Lantus Solostar U-100 Insulin 100 unit/mL (3 mL) subcutaneous insulin pen, metformin 500 mg oral tablet extended release 24 hr, metoprolol succinate 25 mg oral tablet extended release 24 hr, omeprazole 40 mg oral capsule,delayed release(DR/EC), Ozempic 1 mg/dose (2 mg/1.5 mL) subcutaneous pen injector, and Pen Needle 31 gauge x 1/4\" miscellaneous needle and the patient is reported to be compliant with " medication protocol. Medications are reported to be effective. Regarding these diagnoses, referrals were made to the following provider/s --keep all scheduled appointments.   The patient was monitored remotely for diabetic diet, lab results for a period of 7 minutes.   This patient's physical needs are currently being met.   Patient's mental support needs are currently being met.   The patient's cognitive support needs are currently being met.   The patient's psychosocial support needs are N/A,   This patient's functional needs are N/A. Ms Hill has aphasia and has a hard time forming her words.   This patient's environmental needs are N/A. Ms Hill lives alone in her home.           Assessment  · Chronic Care Management (CCM)     V68.89/Z02.89  · Advance directive declined by patient     V49.89/Z78.9  · Aphagia     787.20/R13.0  · Insulin dependent type 2 diabetes mellitus, uncontrolled       Type 2 diabetes mellitus with hyperglycemia     250.02/E11.65  Long term (current) use of insulin     250.02/Z79.4  · Medication management     V58.69/Z79.899  · Refused pneumococcal vaccination     V64.06/Z28.21  · Diabetes Mellitus, Type II     250.00/E11.9  · Hyperlipidemia     272.4/E78.5  · Hypertension     401.9/I10      Plan  · Instructions  o Patient's Health Care Goals: __watch her diet better. better control of blood sugar___________________  o Provider's Health Care Goals: ____________________  o Patient was provided an electronic copy of care plan  o CCM services were explained and offered and patient has accepted these services.  o Patient has given their written consent to recieve CCM services and understands that this includes the authorization of electronic communication of medical information with other treating providers.  o Patient understands that they may stop CCM services at any time and these changes will be effective at the end of the calendar month and will effectively revocate the agreement of CCM  services.  o Patient understands that only one practioner can furnish and be paid for CCM services during one calendar month. Patient also understands that there may be co-payment or deductible fees in association with CCM services.  o Patient will continue with at least monthly follow-up calls with the Nurse Navigator.  · Associate Tasks  o Task ID [id number] CCM: CCM            Electronically Signed by: Sachi Reynolds RN -Author on March 30, 2021 10:06:07 AM

## 2021-05-10 NOTE — OUTREACH NOTE
Quick Note      Patient Name: Charis Hill   Patient ID: [id number]   Sex: Female   YOB: 1950    Primary Care Provider: Redd Magdaleno III MD   Referring Provider: Redd Magdaleno III MD    Visit Date: August 26, 2020    Provider: Redd Magdaleno III MD   Location: [facility]   Location Address: [address] [place] [address]  [id number]   Location Phone: [phone]          History Of Present Illness  CCM Comprehensive Care Plan    This Chronic Medical Management Care Plan for Charis Hill, 70 year old /White female, has been monitored and managed for the month of August. A cumulative time of 20 minutes was spent on this patient record, including face to face visit with patient, face to face with provider, phone call with patient, electronic communication with pharmacist, and chart review.   Regarding the patient's diagnoses Aphagia, Hyperlipidemia, Hypertension, Insulin dependent type 2 diabetes mellitus, uncontrolled, Medication management, Refused pneumococcal vaccination, and UTI, the following items were adressed: medical records, medications, and changes to medical care and any changes can be found within the plan section of the note. A detailed listing of time spent for chronic care management has been scanned into the patient's electronic record. Current medications include: amlodipine 10 mg oral tablet, aspirin 81 mg oral tablet,delayed release (DR/EC), benazepril 40 mg oral tablet, Cardura 4 mg oral tablet, clonidine HCl 0.1 mg oral tablet, doxazosin 4 mg oral tablet, Humalog KwikPen Insulin 100 unit/mL subcutaneous insulin pen, Lantus Solostar U-100 Insulin 100 unit/mL (3 mL) subcutaneous insulin pen, Macrobid 100 mg oral capsule, metformin 500 mg oral tablet extended release 24 hr, metoprolol succinate 25 mg oral tablet extended release 24 hr, omeprazole 40 mg oral capsule,delayed release(DR/EC), Ozempic 1 mg/dose (2 mg/1.5 mL) subcutaneous pen  "injector, and Pen Needle 31 gauge x 1/4\" miscellaneous needle and the patient is reported to be compliant with medication protocol. Medications are reported to be non-effective in controlling symptoms due to her non compliance with diet. She was instructed many times on proper foods and portions and effect of uncontrolled diabetes on the body. She admits to eating fast foods frequently. Regarding these diagnoses, referrals were made to the following provider/s --none   The patient was monitored remotely for blood glucose and I have instructed Ms Hill numerous times on diabetic diet and the effect of uncontrolled diabetes on the body. She reports she understands but continues to consume fast foods frequently. for a period of 20 minutes.   This patient's physical needs are currently being met.   Patient's mental support needs are currently being met.   The patient's cognitive support needs are currently being met. and Ms Hill answers all my questions regarding her uti symptoms and her medications. She does have a stutter that makes it hard to understand her at times. She is followed by Dr Betts for her dysphagia.   The patient's psychosocial support needs are N/A and Ms Hill continues to work at [employer].,   This patient's functional needs are N/A. Ms Hill does not require assistance with ambulation. She is able to perform her own personal care, ADL's, medications, shopping and transportation.   This patient's environmental needs are N/A. Ms Hill lives alone in her home.           Assessment  · Chronic Care Management (CCM)     V68.89/Z02.89  · Aphagia     787.20/R13.0  · Insulin dependent type 2 diabetes mellitus, uncontrolled       Type 2 diabetes mellitus with hyperglycemia     250.02/E11.65  Long term (current) use of insulin     250.02/Z79.4  · Medication management     V58.69/Z79.899  · Refused pneumococcal " vaccination     V64.06/Z28.21  · Hyperlipidemia     272.4/E78.5  · Hypertension     401.9/I10  · UTI (urinary tract infection)     599.0/N39.0      Plan  · Orders  o Urine Culture (Clean Catch) [facility] ([id number]) - 599.0/N39.0 - 08/10/2020  o IOP - Urinalysis without Microscopy (Clinitek) [facility] ([id number]) - 599.0/N39.0 - 08/10/2020  · Medications  o Macrobid 100 mg oral capsule   SIG: take 1 capsule (100 mg) by oral route every 12 hours with food   DISP: (10) capsules with 0 refills  Prescribed on 08/10/2020     · Instructions  o Patient's Health Care Goals: _make better food choices as evidenced by fasting glucose level  and A1C 7-8____________________  o Provider's Health Care Goals: ____________________  o Patient was provided an electronic copy of care plan  o CCM services were explained and offered and patient has accepted these services.  o Patient has given their written consent to recieve CCM services and understands that this includes the authorization of electronic communication of medical information with other treating providers.  o Patient understands that they may stop CCM services at any time and these changes will be effective at the end of the calendar month and will effectively revocate the agreement of CCM services.  o Patient understands that only one practioner can furnish and be paid for CCM services during one calendar month. Patient also understands that there may be co-payment or deductible fees in association with CCM services.  o Patient will continue with at least monthly follow-up calls with the Nurse Navigator.  · Associate Tasks  o Task ID [id number] CCM: CCM            Electronically Signed by: Sachi Reynolds RN -Author on August 26, 2020 09:24:50 AM

## 2021-05-10 NOTE — OUTREACH NOTE
"   Quick Note      Patient Name: Charis Hill   Patient ID: [id number]   Sex: Female   YOB: 1950    Primary Care Provider: Redd Magdaleno III MD   Referring Provider: Redd Magdaleno III MD    Visit Date: June 25, 2020    Provider: Redd Magdaleno III MD   Location: [facility]   Location Address: [address] [place] [address]  [id number]   Location Phone: [phone]          History Of Present Illness  CCM Comprehensive Care Plan    This Chronic Medical Management Care Plan for Charis Hill, 69 year old /White female, has been monitored and managed for the month of June. A cumulative time of 20 minutes was spent on this patient record, including face to face visit with patient, phone call with patient, electronic communication with pharmacist, and chart review.   Regarding the patient's diagnoses Aphagia, Diabetes mellitus, type II, Hyperlipidemia, Hypertension, Insulin dependent type 2 diabetes mellitus, uncontrolled, Medication management, and Refused pneumococcal vaccination, the following items were adressed: medical records and medications and any changes can be found within the plan section of the note. A detailed listing of time spent for chronic care management has been scanned into the patient's electronic record. Current medications include: amlodipine 5 mg oral tablet, aspirin 81 mg oral tablet,delayed release (DR/EC), benazepril 40 mg oral tablet, Cardura 4 mg oral tablet, chlorthalidone 25 mg oral tablet, clonidine HCl 0.1 mg oral tablet, Humalog KwikPen Insulin 100 unit/mL subcutaneous insulin pen, Lantus Solostar U-100 Insulin 100 unit/mL (3 mL) subcutaneous insulin pen, metformin 500 mg oral tablet extended release 24 hr, metoprolol succinate 25 mg oral tablet extended release 24 hr, omeprazole 40 mg oral capsule,delayed release(DR/EC), Ozempic 1 mg/dose (2 mg/1.5 mL) subcutaneous pen injector, and Pen Needle 31 gauge x 1/4\" miscellaneous needle and " the patient is reported to be non-compliant and Ms Hill has been getting samples of all her insulin for several months due to her not being able to afford insurance deductible. She states she still has supply at home but I am not sure this is correct.. Medications are reported to be non-effective in controlling symptoms and changes have been made to the medication protocol. Lantus was increased to 32 units after last A1C done 02/26/2020. Unable to contact Ms Hill to verify she received the message to increase. Regarding these diagnoses, referrals were made to the following provider/s Redd Magdaleno III, MD.   The patient was monitored remotely for blood glucose and Samples of Ozempic for a period of 10 minutes.   This patient's physical needs are currently being met. Ms Hill does not require any assistance with ambulation.   Patient's mental support needs are currently being met.   The patient's cognitive support needs are currently being met.   The patient's psychosocial support needs are N/A. 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 ,   This patient's functional needs are N/A. Ms Hill does not require assistance with ambulation. 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 She is able to perform her own personal care, ADL's, medications, meals, shopping and transportation to appointments.   This patient's environmental needs are N/A. Ms Hill lives alone in her home.           Assessment  · Chronic Care Management (CCM)     V68.89/Z02.89  · Aphagia     787.20/R13.0  · Insulin dependent type 2 diabetes mellitus, uncontrolled       Type 2 diabetes mellitus with hyperglycemia     250.02/E11.65  Long term (current) use of insulin     250.02/Z79.4  · Medication management     V58.69/Z79.899  · Refused pneumococcal vaccination     V64.06/Z28.21  · Diabetes mellitus, type  II     250.00/E11.9  · Hyperlipidemia     272.4/E78.5  · Hypertension     401.9/I10      Plan  · Instructions  o Patient's Health Care Goals: _blood sugar better controlled as evidenced by fasting levels , A1c 7 or below, taking insulins as orders and observing diabetic diet as instructed. ____________________  o Provider's Health Care Goals: ____________________  o Patient was provided an electronic copy of care plan  o CCM services were explained and offered and patient has accepted these services.  o Patient has given their written consent to recieve CCM services and understands that this includes the authorization of electronic communication of medical information with other treating providers.  o Patient understands that they may stop CCM services at any time and these changes will be effective at the end of the calendar month and will effectively revocate the agreement of CCM services.  o Patient understands that only one practioner can furnish and be paid for CCM services during one calendar month. Patient also understands that there may be co-payment or deductible fees in association with CCM services.  o Patient will continue with at least monthly follow-up calls with the Nurse Navigator.  · Associate Tasks  o Task ID [id number] CCM: CCM            Electronically Signed by: Sachi Reynolds RN -Author on June 25, 2020 02:06:49 PM

## 2021-05-13 NOTE — PROGRESS NOTES
Progress Note      Patient Name: Charis Hill   Patient ID: [id number]   Sex: Female   YOB: 1950    Primary Care Provider: Redd Magdaleno III MD   Referring Provider: Redd Magdaleno III MD    Visit Date: August 17, 2020    Provider: Redd Magdaleno III MD   Location: [facility]   Location Address: [address] [place] [address]  [id number]   Location Phone: [phone]          Chief Complaint  · 6 month follow up  · Fu Diabetes      History Of Present Illness  Charis Hill is a 70 year old /White female who presents for evaluation and treatment of: Patient here to fu diabetes and medication. Not taking medication as she is suppose to.      HPI     patient is 70-year-old diabetes.  States she is eating what she wishes and weight is up about 10 pounds last A1c was 9.6.  Patient need better control.  She is not taking chlorthalidone to 10 mg daily because it causes her to urinate to much.  Patient to continue on the Benzapril Cardura clonidine.  She is not willing to take half of the the Chlorthalidone.    Review of systems    cardiovascular chest pain no palpitation  Respiratory no shortness of breath dyspnea on exertion  GI discussed diet patient states she is watching her salt and understands should be eating more fruits and vegetables.     Physical exam     weight 222 this is a 9 pound weight gain.    Blood pressure is 164/80 temperature is 97.2   told her she needs have a lower blood pressure     General     no distress  Cardiovascular regular rhythm no murmur  Respiratory no increased work of breathing lungs are clear and equal bilaterally no wheezes no rales or rhonchi      The nurse practitioner navigator was with us during the meeting and spent time   Needs to get an A1c now and before and after counseling to certain medicine patient is very resistant to doing which she needs to do.    Despite the risk of stroke and heart attack and death.      Assessment      #1type 2 diabetes   #2 not increase amlodipine to 10  Plan recheck in 3 months       Past Medical History  Disease Name Date Onset Notes   Advance directive declined by patient 02/26/2020 --    Aphagia 08/14/2019 --    Diabetes Mellitus, Type II --  --    Hyperlipidemia --  --    Hypertension --  --    Insulin dependent type 2 diabetes mellitus, uncontrolled 02/26/2020 --    Medication management 02/26/2020 --    Refused pneumococcal vaccination 02/26/2020 --          Medication List  Name Date Started Instructions   amlodipine 10 mg oral tablet 08/17/2020 take 1 tablet (10 mg) by oral route once daily for 90 days   aspirin 81 mg oral tablet,delayed release (DR/EC)  take 1 tablet (81 mg) by oral route once daily   benazepril 40 mg oral tablet 07/16/2020 TAKE 1 TABLET DAILY   Cardura 4 mg oral tablet 07/15/2019 Take 1/2 tablet (2mg) daily X 1 week then take 1 tablet (4 mg) by oral route once daily   doxazosin 4 mg oral tablet 07/16/2020 AT THE START OF THERAPY, TAKE ONE-HALF (1/2) TABLET ( 2 MG ) DAILY FOR ONE WEEK, THEN TAKE ONE TABLET ONCE DAILY THEREAFTER.   Humalog KwikPen Insulin 100 unit/mL subcutaneous insulin pen 06/03/2020 inject by subq route per sliding scale 8u+2u. 150-199=8u, 200-249=10u, 250-299=12u, 300-349=14u, 350-399=16u, 400-449=18u, 450-499=20u   Lantus Solostar U-100 Insulin 100 unit/mL (3 mL) subcutaneous insulin pen 02/27/2020 inject 32 units daily   Macrobid 100 mg oral capsule 08/13/2020 take 1 capsule (100 mg) by oral route every 12 hours with food   metformin 500 mg oral tablet extended release 24 hr 05/11/2020 TAKE ONE TABLET BY MOUTH DAILY WITH THE EVENING MEAL   metoprolol succinate 25 mg oral tablet extended release 24 hr 07/16/2020 TAKE 1 TABLET DAILY   omeprazole 40 mg oral capsule,delayed release(DR/EC) 02/26/2020 TAKE ONE CAPSULE BY MOUTH DAILY BEFORE A MEAL   Ozempic 1 mg/dose (2 mg/1.5 mL) subcutaneous pen injector 02/26/2020 inject 0.5 milliliter by subcutaneous route once a  "week   Pen Needle 31 gauge x 1/4\" miscellaneous needle 10/30/2019 use with insulin up to 6 times a day         Allergy List  Allergen Name Date Reaction Notes   NO KNOWN DRUG ALLERGIES --  --  --        Allergies Reconciled  Family Medical History  Disease Name Relative/Age Notes   Heart Disease Mother/   --    CVA (Cerebrovascular accident) Mother/   --    Diabetes, unspecified type Father/63   --          Social History  Finding Status Start/Stop Quantity Notes   Active but no formal exercise --  --/-- --  walking   Advance directive declined by patient --  --/-- --  --    Alcohol Never --/-- --  --     --  --/-- --  --    Tobacco Never --/-- --  --          Immunizations  NameDate Admin Mfg Trade Name Lot Number Route Inj VIS Given VIS Publication   [id number]/03/2019 [facility] Fluzone Quadrivalent [id number] IM LD 10/03/2019    Comments: NDC [id number]   [id number]/04/2018 [facility] Fluzone Quadrivalent [id number] IM RD 10/04/2018 08/07/2015   Comments: ndc [id number]         Vitals  Date Time BP Position Site L\R Cuff Size HR RR TEMP (F) WT  HT  BMI kg/m2 BSA m2 O2 Sat HC       08/17/2020 11:54 /80 Sitting      97.2 222lbs 0oz 5'  6\" 35.83 2.17     08/17/2020 12:02 /80 Sitting       222lbs 0oz 5'  6\" 35.83 2.17                   Assessment  · Insulin dependent type 2 diabetes mellitus, uncontrolled       Type 2 diabetes mellitus with hyperglycemia     250.02/E11.65  Long term (current) use of insulin     250.02/Z79.4  · Medication management     V58.69/Z79.899  · Refused pneumococcal vaccination     V64.06/Z28.21  · Hypertension     401.9/I10    Problems Reconciled  Plan  · Orders  o ACO - Pt declines to or was not able to provide an Advance Care Plan or name a Surrogate Decision Maker (1124F) - - 08/17/2020  o Hgb A1c [facility] ([id number]) - 250.02/E11.65, 250.02/Z79.4, V58.69/Z79.899 - 08/17/2020  o ACO-39: Current medications updated and reviewed () - - 08/17/2020  o ACO-15: Pneumococcal Vaccine Administered " or Previously Received (4040F) - - 08/19/2020  o ACO-14: Influenza immunization administered or previously received () - - 08/17/2020  · Medications  o clonidine HCl 0.1 mg oral tablet   SIG: take 1 tablet (0.1 mg) by oral route 2 times per day for 90 days   DISP: (180) tablets with 0 refills  Prescribed on 08/17/2020     o amlodipine 10 mg oral tablet   SIG: take 1 tablet (10 mg) by oral route once daily for 90 days   DISP: (90) tablets with 1 refills  Adjusted on 08/17/2020     o chlorthalidone 25 mg oral tablet   SIG: TAKE ONE-HALF (1/2) TABLET DAILY   DISP: (45) Tablet with 0 refills  Discontinued on 08/17/2020     o clonidine HCl 0.1 mg oral tablet   SIG: take 1 tablet (0.1 mg) by oral route 2 times per day for 90 days   DISP: (180) tablets with 1 refills  Discontinued on 08/17/2020     o Medications have been Reconciled  o Transition of Care or Provider Policy  · Instructions  o Handouts were given to patient:   o Take all medications as prescribed/directed.  o Patient instructed/educated on their diet and exercise program.  o Patient was educated/instructed on their diagnosis, treatment and medications prior to discharge from the clinic today.  o Patient counseled to reduce calorie intake.  o Patient was instructed to exercise regularly.  o Call the office with any concerns or questions.  o Bring all medicines with their bottles to each office visit.  o Time spent with the patient was 40 minutes, more than 50% face to face.  o Risks, benefits, and alternatives were discussed with the patient. The patient is aware of risks associated with: uncontrolled diabetes and elevated BP  o Pneumonia vaccine declined.   o Discussed Covid-19 precautions including, but not limited to, social distancing, avoid touching your face, and hand washing.             Electronically Signed by: Kajal Valera, -Author on August 19, 2020 01:42:13 PM  Electronically Co-signed by: Redd Magdaleno III MD -Reviewer on August 19, 2020  05:17:29 PM

## 2021-05-14 VITALS
WEIGHT: 215 LBS | BODY MASS INDEX: 34.55 KG/M2 | DIASTOLIC BLOOD PRESSURE: 58 MMHG | TEMPERATURE: 98.6 F | SYSTOLIC BLOOD PRESSURE: 120 MMHG | HEIGHT: 66 IN | HEART RATE: 116 BPM | OXYGEN SATURATION: 95 %

## 2021-05-14 VITALS
DIASTOLIC BLOOD PRESSURE: 64 MMHG | WEIGHT: 216 LBS | SYSTOLIC BLOOD PRESSURE: 130 MMHG | TEMPERATURE: 97.6 F | HEIGHT: 66 IN | BODY MASS INDEX: 34.72 KG/M2 | OXYGEN SATURATION: 97 % | HEART RATE: 68 BPM

## 2021-05-14 NOTE — PROGRESS NOTES
Progress Note      Patient Name: Charis Hill   Patient ID: [id number]   Sex: Female   YOB: 1950    Primary Care Provider: Redd Magdaleno III MD   Referring Provider: Redd Magdaleno III MD    Visit Date: March 15, 2021    Provider: Redd Magdaleno III MD   Location: [facility]   Location Address: [address] [place] [address]  [id number]   Location Phone: [phone]          Chief Complaint  · Annual Wellness Exam      History Of Present Illness  The patient is a 70 year old /White female who has come to this office for her Annual Wellness Visit.   Her Primary Care Provider is Redd Magdaleno III, MD. Her comprehensive Care Team list, including suppliers, has been updated on the Facesheet. Her medical/family history, height, weight, BMI, and blood pressure have been reviewed and are in the chart. The Health Risk Assessment has been completed and scanned in the chart.   Medications are listed in the medication list.   The active problem list includes: Advance directive declined by patient, Aphagia, Diabetes Mellitus, Type II, Hyperlipidemia, Hypertension, Insulin dependent type 2 diabetes mellitus, uncontrolled, and Refused pneumococcal vaccination   The patient does not have a history of substance use.   Patient reports her diet is adequate.   The Mini-Cog has been administered and is scanned in chart. The results are negative. Her cognitive function is without limitation.   A hearing loss screen was completed today and the result is negative.   Patient does not have any risk factors for depression. Patient completed the PHQ-9 today and it has been scanned in the chart. The total score is PHQ-9 TOTAL SCORE 0.   The Get Up and Go screen was administered today and the result is negative.   The Gutierrez Index of [place] in ADLs indicated full function (score of 6).   A Falls Risk Assessment has been completed, including a review of home fall hazards and  medication review.   Overall, the patient's functional ability is noted by this provider to be within normal limits. Her level of safety is noted to be within normal limits. Her balance/gait is within normal limits. There have been no falls in the past year. Patient-specific home safety recommendations have been reviewed and a copy has been given to patient.   She denies issues with leaking urine.   There are no additional risk factors identified.   Living Will/Advanced Directive has not previously been completed.   Personalized health advice was given to the patient and a written health screening schedule was established; see Plan for details.       Past Medical History  Disease Name Date Onset Notes   Advance directive declined by patient 02/26/2020 --    Aphagia 08/14/2019 --    Diabetes Mellitus, Type II --  --    Hyperlipidemia --  --    Hypertension --  --    Insulin dependent type 2 diabetes mellitus, uncontrolled 02/26/2020 --    Medication management 02/26/2020 --    Refused pneumococcal vaccination 02/26/2020 --          Medication List  Name Date Started Instructions   amlodipine 10 mg oral tablet 03/15/2021 take 1 tablet (10 mg) by oral route once daily for 90 days   aspirin 81 mg oral tablet,delayed release (DR/EC)  take 1 tablet (81 mg) by oral route once daily   benazepril 40 mg oral tablet 03/15/2021 TAKE 1 TABLET DAILY   clonidine HCl 0.1 mg oral tablet 03/15/2021 take 1 tablet (0.1 mg) by oral route 2 times per day for 90 days   doxazosin 4 mg oral tablet 03/15/2021 take 1 tablet (4 mg) by oral route once daily for 90 days   Humalog KwikPen Insulin 100 unit/mL subcutaneous insulin pen 03/15/2021 inject by subq route per sliding scale 8u+2u. 150-199=8u, 200-249=10u, 250-299=12u, 300-349=14u, 350-399=16u, 400-449=18u, 450-499=20u   Lantus Solostar U-100 Insulin 100 unit/mL (3 mL) subcutaneous insulin pen 03/15/2021 INJECT 28 UNITS DAILY   metformin 500 mg oral tablet extended release 24 hr 03/15/2021  "TAKE ONE TABLET BY MOUTH DAILY WITH THE EVENING MEAL   metoprolol succinate 25 mg oral tablet extended release 24 hr 03/15/2021 TAKE 1 TABLET DAILY   omeprazole 40 mg oral capsule,delayed release(/EC) 03/15/2021 TAKE ONE CAPSULE BY MOUTH DAILY BEFORE A MEAL   Ozempic 1 mg/dose (2 mg/1.5 mL) subcutaneous pen injector 10/28/2020 inject 0.5 milliliter by subcutaneous route once a week   Pen Needle 31 gauge x 1/4\" miscellaneous needle 10/30/2019 use with insulin up to 6 times a day         Allergy List  Allergen Name Date Reaction Notes   NO KNOWN DRUG ALLERGIES --  --  --          Family Medical History  Disease Name Relative/Age Notes   Heart Disease Mother/   --    CVA (Cerebrovascular accident) Mother/   --    Diabetes, unspecified type Father/63   --          Social History  Finding Status Start/Stop Quantity Notes   Active but no formal exercise --  --/-- --  walking   Advance directive declined by patient --  --/-- --  --    Alcohol Never --/-- --  --     --  --/-- --  --    Tobacco Never --/-- --  --          Immunizations  NameDate Admin Mfg Trade Name Lot Number Route Inj VIS Given VIS Publication   InfluenzaRefused 03/15/2021 NE Not Entered  NE NE     Comments:          Review of Systems  · Constitutional  o * See HPI  · Eyes  o * See HPI  · HENT  o * See HPI  · Breasts  o * See HPI  · Cardiovascular  o * See HPI  · Respiratory  o * See HPI  · Gastrointestinal  o * See HPI  · Genitourinary  o * See HPI  · Integument  o * See HPI  · Neurologic  o * See HPI  · Musculoskeletal  o * See HPI  · Endocrine  o * See HPI  · Psychiatric  o * See HPI  · Heme-Lymph  o * See HPI  · Allergic-Immunologic  o * See HPI      Vitals  Date Time BP Position Site L\R Cuff Size HR RR TEMP (F) WT  HT  BMI kg/m2 BSA m2 O2 Sat FR L/min FiO2 HC       03/15/2021 09:42 /58 Sitting    116 - R  98.6 214lbs 16oz 5'  6\" 34.7 2.13 95 %  21%              Assessment  · Encounter for Medicare annual wellness " exam     V70.0/Z00.00  · Screening for depression     V79.0/Z13.89  · Screening for alcoholism     V79.1/Z13.39  · Advanced care planning/counseling discussion     V65.49/Z71.89    Problems Reconciled  Plan  · Orders  o Falls Risk Assessment Completed (3288F) - V70.0/Z00.00 - 03/15/2021  o Brief hearing screening (written) [facility] () - V70.0/Z00.00 - 03/15/2021  o Annual Wellness Visit-includes a Personalized Prevention Plan of Service (PPS), SUBSEQUENT VISIT (Medicare) () - V70.0/Z00.00 - 03/15/2021  o Presence or absence of urinary incontinence assessed (MAURISIO) (1090F) - V70.0/Z00.00 - 03/15/2021  o Annual depression screening using the PHQ-9 tool, 15 minutes (, 02731) - V79.0/Z13.89 - 03/15/2021  o ACO-18: Negative screen for clinical depression using a standardized tool () - V79.0/Z13.89 - 03/15/2021  o Annual alcohol screening using the AUDIT-C tool, 15 minutes [facility] (83153, ) - V79.1/Z13.39 - 03/15/2021  o Negative alcohol screening () - V79.1/Z13.39 - 03/15/2021  o ACO - Pt declines to or was not able to provide an Advance Care Plan or name a Surrogate Decision Maker (1124F) - V65.49/Z71.89 - 03/15/2021  o Influenza immunization was ordered or administered () - - 03/15/2021  o ACO-39: Current medications updated and reviewed (, 1159F) - - 03/15/2021  · Medications  o Medications have been Reconciled  o Transition of Care or Provider Policy  · Instructions  o Health Risk Assessment has been reviewed with the patient.  o Written health screening schedule for next 5-10 years was established with patient; information scanned in chart and given/mailed to patient.  o Fall prevention methods discussed and a copy of recommendations given/mailed to patient.  o Today's PHQ-9 score is: 0___  o Audit-C Questionnaire completed and scanned into the EMR under the designated folder within the patient's documents.  o Audit-C score of 0-4 - Negative Screen - Brief Discussion  o CMS (Medicare)  covers annual screening for adults for alcohol misuse screening to assure accurate diagnosis, effective treatment, and follow up where appropriate.  o Face-to-face Advanced Care Planning discussed for a minimum of 16 minutes.            Electronically Signed by: Shereen Love, -Author on March 15, 2021 04:55:48 PM  Electronically Co-signed by: Redd Magdaleno III MD -Reviewer on March 15, 2021 04:57:27 PM

## 2021-05-14 NOTE — PROGRESS NOTES
Progress Note      Patient Name: Charis Hill   Patient ID: [id number]   Sex: Female   YOB: 1950    Primary Care Provider: Redd Magdaleno III MD   Referring Provider: Redd Magdaleno III MD    Visit Date: March 15, 2021    Provider: Redd Magdaleno III MD   Location: [facility]   Location Address: [address] [place], [place] [address]   Location Phone: [phone]          Chief Complaint  · 6 month follow up dm type II      History Of Present Illness  Charis Hill is a 70 year old /White female who presents for evaluation and treatment of:      HPI    Patient is a 70-year-old type 2 diabetes hypertension functional dysphagia. Here for her 6month checkup.  Has been evaluated by Dr. Betts, no MRI because of claustrophobia.    Review of systems     cardiovascular no chest pain no palpitations  Respiratory no shortness of breath  GI patient refuses colonoscopy.  Neurologic able to think okay just speaks tomographically.      PE    Weight 215 is 2 pound weight gain blood pressure 120/58 temperature 98.6 heart rate is 95 pulse ox 95  General no distress  Cardiovascular regular rhythm no murmur  respiratory breathing no rales no rhonchi no wheezes  Neurologic telegraphic speech mentation is normal gait is normal    Assessment     type 2 diabetes last A1c 7.5   hypertension controlled patient   refuses all immunizations    Plan     recheck 6 months         Past Medical History  Disease Name Date Onset Notes   Advance directive declined by patient 02/26/2020 --    Aphagia 08/14/2019 --    Diabetes Mellitus, Type II --  --    Hyperlipidemia --  --    Hypertension --  --    Insulin dependent type 2 diabetes mellitus, uncontrolled 02/26/2020 --    Medication management 02/26/2020 --    Refused pneumococcal vaccination 02/26/2020 --          Medication List  Name Date Started Instructions   amlodipine 10 mg oral tablet 11/13/2020 take 1 tablet (10 mg) by  "oral route once daily for 90 days   aspirin 81 mg oral tablet,delayed release (DR/EC)  take 1 tablet (81 mg) by oral route once daily   benazepril 40 mg oral tablet 11/09/2020 TAKE 1 TABLET DAILY   Cardura 4 mg oral tablet 07/15/2019 Take 1/2 tablet (2mg) daily X 1 week then take 1 tablet (4 mg) by oral route once daily   clonidine HCl 0.1 mg oral tablet 11/13/2020 take 1 tablet (0.1 mg) by oral route 2 times per day for 90 days   doxazosin 4 mg oral tablet 07/16/2020 AT THE START OF THERAPY, TAKE ONE-HALF (1/2) TABLET ( 2 MG ) DAILY FOR ONE WEEK, THEN TAKE ONE TABLET ONCE DAILY THEREAFTER.   Humalog KwikPen Insulin 100 unit/mL subcutaneous insulin pen 06/03/2020 inject by subq route per sliding scale 8u+2u. 150-199=8u, 200-249=10u, 250-299=12u, 300-349=14u, 350-399=16u, 400-449=18u, 450-499=20u   Lantus Solostar U-100 Insulin 100 unit/mL (3 mL) subcutaneous insulin pen 02/26/2021 INJECT 28 UNITS DAILY   metformin 500 mg oral tablet extended release 24 hr 11/09/2020 TAKE ONE TABLET BY MOUTH DAILY WITH THE EVENING MEAL   metoprolol succinate 25 mg oral tablet extended release 24 hr 07/16/2020 TAKE 1 TABLET DAILY   omeprazole 40 mg oral capsule,delayed release(DR/EC) 11/13/2020 TAKE ONE CAPSULE BY MOUTH DAILY BEFORE A MEAL   Ozempic 1 mg/dose (2 mg/1.5 mL) subcutaneous pen injector 10/28/2020 inject 0.5 milliliter by subcutaneous route once a week   Pen Needle 31 gauge x 1/4\" miscellaneous needle 10/30/2019 use with insulin up to 6 times a day         Allergy List  Allergen Name Date Reaction Notes   NO KNOWN DRUG ALLERGIES --  --  --        Allergies Reconciled  Family Medical History  Disease Name Relative/Age Notes   Heart Disease Mother/   --    CVA (Cerebrovascular accident) Mother/   --    Diabetes, unspecified type Father/63   --          Social History  Finding Status Start/Stop Quantity Notes   Active but no formal exercise --  --/-- --  walking   Advance directive declined by patient --  --/-- --  --  " "  Alcohol Never --/-- --  --     --  --/-- --  --    Tobacco Never --/-- --  --          Immunizations  NameDate Admin Mfg Trade Name Lot Number Route Inj VIS Given VIS Publication   InfluenzaRefused 03/15/2021 NE Not Entered  NE NE     Comments:          Review of Systems  · Constitutional  o * See HPI  · Eyes  o * See HPI  · HENT  o * See HPI  · Breasts  o * See HPI  · Cardiovascular  o * See HPI  · Respiratory  o * See HPI  · Gastrointestinal  o * See HPI  · Genitourinary  o * See HPI  · Integument  o * See HPI  · Neurologic  o * See HPI  · Musculoskeletal  o * See HPI  · Endocrine  o * See HPI  · Psychiatric  o * See HPI  · Heme-Lymph  o * See HPI  · Allergic-Immunologic  o * See HPI      Vitals  Date Time BP Position Site L\R Cuff Size HR RR TEMP (F) WT  HT  BMI kg/m2 BSA m2 O2 Sat FR L/min FiO2 HC       03/15/2021 09:42 /58 Sitting    116 - R  98.6 214lbs 16oz 5'  6\" 34.7 2.13 95 %  21%                  Assessment  · Encounter for screening for cardiovascular disorders     V81.2/Z13.6  · Diabetes mellitus, type 2     250.00/E11.9  · Aphagia     787.20/R13.0  · Insulin dependent type 2 diabetes mellitus, uncontrolled       Type 2 diabetes mellitus with hyperglycemia     250.02/E11.65  Long term (current) use of insulin     250.02/Z79.4  · Medication management     V58.69/Z79.899  · Refused pneumococcal vaccination     V64.06/Z28.21  · Diabetes Mellitus, Type II     250.00/E11.9  · Hypertension     401.9/I10  · Routine check-up     V70.0/Z00.00  · Refused influenza vaccine     V64.06/Z28.21  · COVID-19 vaccination declined     V64.06/Z28.21    Problems Reconciled  Plan  · Orders  o Urine microalbumin (23922) - 250.00/E11.9 - 03/15/2021  o Hgb A1c [facility] (69409) - V58.69/Z79.899, 250.00/E11.9 - 03/15/2021  o Physical, Primary Care Panel (CBC, CMP, Lipid, TSH) [facility] (69324, 32577, 00560, 35277) - V58.69/Z79.899, V70.0/Z00.00, V81.2/Z13.6, 401.9/I10 - 03/15/2021  o ACO-39: Current medications updated and " reviewed (1159F, ) - - 03/15/2021  · Medications  o doxazosin 4 mg oral tablet   SIG: take 1 tablet (4 mg) by oral route once daily for 90 days   DISP: (90) Tablet with 1 refills  Adjusted on 03/15/2021     o amlodipine 10 mg oral tablet   SIG: take 1 tablet (10 mg) by oral route once daily for 90 days   DISP: (90) Tablet with 1 refills  Refilled on 03/15/2021     o benazepril 40 mg oral tablet   SIG: TAKE 1 TABLET DAILY   DISP: (90) Tablet with 1 refills  Refilled on 03/15/2021     o clonidine HCl 0.1 mg oral tablet   SIG: take 1 tablet (0.1 mg) by oral route 2 times per day for 90 days   DISP: (180) Tablet with 1 refills  Refilled on 03/15/2021     o Humalog KwikPen Insulin 100 unit/mL subcutaneous insulin pen   SIG: inject by subq route per sliding scale 8u+2u. 150-199=8u, 200-249=10u, 250-299=12u, 300-349=14u, 350-399=16u, 400-449=18u, 450-499=20u   DISP: (15) Milliliter with 4 refills  Refilled on 03/15/2021     o Lantus Solostar U-100 Insulin 100 unit/mL (3 mL) subcutaneous insulin pen   SIG: INJECT 28 UNITS DAILY   DISP: (15) Milliliter with 1 refills  Refilled on 03/15/2021     o metformin 500 mg oral tablet extended release 24 hr   SIG: TAKE ONE TABLET BY MOUTH DAILY WITH THE EVENING MEAL   DISP: (90) Tablet with 1 refills  Refilled on 03/15/2021     o metoprolol succinate 25 mg oral tablet extended release 24 hr   SIG: TAKE 1 TABLET DAILY   DISP: (90) Tablet with 1 refills  Refilled on 03/15/2021     o omeprazole 40 mg oral capsule,delayed release(DR/EC)   SIG: TAKE ONE CAPSULE BY MOUTH DAILY BEFORE A MEAL   DISP: (90) Capsule with 1 refills  Refilled on 03/15/2021     o Cardura 4 mg oral tablet   SIG: Take 1/2 tablet (2mg) daily X 1 week then take 1 tablet (4 mg) by oral route once daily   DISP: (90) tablets with 3 refills  Discontinued on 03/15/2021     o Medications have been Reconciled  o Transition of Care or Provider Policy  · Instructions  o Continue blood sugar monitoring daily and record.  Bring your log to office visits. Call the office for readings below 70 and above 250 or any complications.  o Daily foot care. Avoid walking barefoot. Annual Dilated Eye Exam.  o Discussed with patient blood pressure monitoring, hemoglobin A1C levels need to be below 7.0, and LDL (Lipid) goals below 70.  o Patient is taking medications as prescribed and doing well.   o Take all medications as prescribed/directed.  o Patient instructed/educated on their diet and exercise program.  o Patient was educated/instructed on their diagnosis, treatment and medications prior to discharge from the clinic today.  o Bring all medicines with their bottles to each office visit.  o Time spent with the patient was 20 minutes, more than 50% face to face.  o Chronic conditions reviewed and taken into consideration for today's treatment plan.  o Discussed Covid-19 precautions including, but not limited to, social distancing, avoid touching your face, and hand washing.             Electronically Signed by: Shereen Love, -Author on March 15, 2021 11:24:48 AM  Electronically Co-signed by: Redd Magdaleno III MD -Reviewer on March 15, 2021 12:27:20 PM

## 2021-05-14 NOTE — PROGRESS NOTES
Progress Note      Patient Name: Charis Hill   Patient ID: [id number]   Sex: Female   YOB: 1950    Primary Care Provider: Redd Magdaleno III MD   Referring Provider: Redd Magdaleno III MD    Visit Date: April 14, 2021    Provider: Redd Magdaleno III MD   Location: [facility]   Location Address: [address] [place], [place] [address]   Location Phone: [phone]          Chief Complaint  · dysuria for 4 days, frequency, no fever, some nausea      History Of Present Illness  Charis Hill is a 70 year old /White female who presents for evaluation and treatment of: dysuria for 4 days, frequency and urgency to urinate. no fever.      HPI     patient is 70-year-old here for dysuria and frequency.  No fever no chills.   history of type 2 diabetes, hypertension, aphasia       Review of systems     cardiovascular chest pain no palpitations  Respiratory no shortness of breath no dyspnea exertion   some frequency and dysuria.  Urinalysis shows positive nitrites only.      Physical exam     blood pressures 130/64 weight is 216 temperature 97 heart rate 68  General no distress  Respiratory no increased work of breathing lungs clear and equal bilaterally no rales no rhonchi no wheezes  Cardiovascular regular rhythm no murmur  no supra pubic tenderness no CVA tenderness    Assessment     possible UTI and Bactrim DS 1 twice daily     Plan     urine for C&S   call us Friday       Past Medical History  Disease Name Date Onset Notes   Advance directive declined by patient 02/26/2020 --    Aphagia 08/14/2019 --    Diabetes Mellitus, Type II --  --    Hyperlipidemia --  --    Hypertension --  --    Insulin dependent type 2 diabetes mellitus, uncontrolled 02/26/2020 --    Medication management 02/26/2020 --    Refused pneumococcal vaccination 02/26/2020 --          Medication List  Name Date Started Instructions   amlodipine 10 mg oral tablet 03/15/2021 take 1  "tablet (10 mg) by oral route once daily for 90 days   aspirin 81 mg oral tablet,delayed release (DR/EC)  take 1 tablet (81 mg) by oral route once daily   benazepril 40 mg oral tablet 03/15/2021 TAKE 1 TABLET DAILY   clonidine HCl 0.1 mg oral tablet 03/15/2021 take 1 tablet (0.1 mg) by oral route 2 times per day for 90 days   doxazosin 4 mg oral tablet 03/15/2021 take 1 tablet (4 mg) by oral route once daily for 90 days   Humalog KwikPen Insulin 100 unit/mL subcutaneous insulin pen 03/15/2021 inject by subq route per sliding scale 8u+2u. 150-199=8u, 200-249=10u, 250-299=12u, 300-349=14u, 350-399=16u, 400-449=18u, 450-499=20u   Lantus Solostar U-100 Insulin 100 unit/mL (3 mL) subcutaneous insulin pen 03/15/2021 INJECT 28 UNITS DAILY   metformin 500 mg oral tablet extended release 24 hr 03/15/2021 TAKE ONE TABLET BY MOUTH DAILY WITH THE EVENING MEAL   metoprolol succinate 25 mg oral tablet extended release 24 hr 03/15/2021 TAKE 1 TABLET DAILY   omeprazole 40 mg oral capsule,delayed release(DR/EC) 03/15/2021 TAKE ONE CAPSULE BY MOUTH DAILY BEFORE A MEAL   Ozempic 1 mg/dose (2 mg/1.5 mL) subcutaneous pen injector 10/28/2020 inject 0.5 milliliter by subcutaneous route once a week   Pen Needle 31 gauge x 1/4\" miscellaneous needle 10/30/2019 use with insulin up to 6 times a day         Allergy List  Allergen Name Date Reaction Notes   NO KNOWN DRUG ALLERGIES --  --  --        Allergies Reconciled  Family Medical History  Disease Name Relative/Age Notes   Heart Disease Mother/   --    CVA (Cerebrovascular accident) Mother/   --    Diabetes, unspecified type Father/63   --          Social History  Finding Status Start/Stop Quantity Notes   Active but no formal exercise --  --/-- --  walking   Advance directive declined by patient --  --/-- --  --    Alcohol Never --/-- --  --     --  --/-- --  --    Tobacco Never --/-- --  --          Immunizations  NameDate Admin Mfg Trade Name Lot Number Route Inj VIS Given VIS " "Publication   InfluenzaRefused 03/15/2021 NE Not Entered  NE NE     Comments:          Review of Systems  · Constitutional  o * See HPI  · Eyes  o * See HPI  · HENT  o * See HPI  · Breasts  o * See HPI  · Cardiovascular  o * See HPI  · Respiratory  o * See HPI  · Gastrointestinal  o * See HPI  · Genitourinary  o * See HPI  · Integument  o * See HPI  · Neurologic  o * See HPI  · Musculoskeletal  o * See HPI  · Endocrine  o * See HPI  · Psychiatric  o * See HPI  · Heme-Lymph  o * See HPI  · Allergic-Immunologic  o * See HPI      Vitals  Date Time BP Position Site L\R Cuff Size HR RR TEMP (F) WT  HT  BMI kg/m2 BSA m2 O2 Sat FR L/min FiO2 HC       04/14/2021 11:23 /64 Sitting    68 - R  97.6 216lbs 0oz 5'  6\" 34.86 2.14 97 %  21%              Results  · In-Office Procedures  o Lab procedure  § IOP - Urinalysis without Microscopy (Clinitek) [facility] (10880)   § Color Ur: Yellow   § Clarity Ur: Clear   § Glucose Ur Ql Strip: Negative   § Bilirub Ur Ql Strip: Negative   § Ketones Ur Ql Strip: Negative   § Sp Gr Ur Qn: 1.015   § Hgb Ur Ql Strip: Small   § pH Ur-LsCnc: 5.5   § Prot Ur Ql Strip: Trace   § Urobilinogen Ur Strip-mCnc: 0.2 E.U./dL   § Nitrite Ur Ql Strip: Positive   § WBC Est Ur Ql Strip: Large       Assessment  · Urinary tract infection     599.0/N39.0  · Dysuria     788.1/R30.0    Problems Reconciled  Plan  · Orders  o Urine culture (95503, 07225) - 788.1/R30.0, 599.0/N39.0 - 04/14/2021  o ACO-39: Current medications updated and reviewed (1159H, ) - - 04/14/2021  · Medications  o Bactrim -160 mg oral tablet   SIG: take 1 tablet by oral route every 12 hours for 5 days   DISP: (10) Tablet with 0 refills  Prescribed on 04/14/2021     o Medications have been Reconciled  o Transition of Care or Provider Policy  · Instructions  o Increase fluid intake. If you develop fever, chills, N/V, flank pain, or worsening of your symptoms return to the office or go to the ER.  o Patient is taking medications as " prescribed and doing well.   o Take all medications as prescribed/directed.  o Patient instructed/educated on their diet and exercise program.  o Patient was educated/instructed on their diagnosis, treatment and medications prior to discharge from the clinic today.  o Bring all medicines with their bottles to each office visit.  o Time spent with the patient was 30 minutes, more than 50% face to face.  o Chronic conditions reviewed and taken into consideration for today's treatment plan.  o Discussed Covid-19 precautions including, but not limited to, social distancing, avoid touching your face, and hand washing.             Electronically Signed by: Shereen Love, -Author on April 14, 2021 04:27:43 PM  Electronically Co-signed by: Redd Magdaleno III MD -Reviewer on April 14, 2021 05:50:42 PM

## 2021-05-15 VITALS
BODY MASS INDEX: 35.36 KG/M2 | HEIGHT: 66 IN | WEIGHT: 220 LBS | SYSTOLIC BLOOD PRESSURE: 142 MMHG | DIASTOLIC BLOOD PRESSURE: 70 MMHG

## 2021-05-15 VITALS
HEIGHT: 66 IN | WEIGHT: 212 LBS | BODY MASS INDEX: 34.07 KG/M2 | DIASTOLIC BLOOD PRESSURE: 90 MMHG | SYSTOLIC BLOOD PRESSURE: 142 MMHG

## 2021-05-15 VITALS
DIASTOLIC BLOOD PRESSURE: 80 MMHG | HEIGHT: 66 IN | SYSTOLIC BLOOD PRESSURE: 164 MMHG | TEMPERATURE: 97.2 F | WEIGHT: 222 LBS | BODY MASS INDEX: 35.68 KG/M2

## 2021-05-15 VITALS
SYSTOLIC BLOOD PRESSURE: 150 MMHG | WEIGHT: 213 LBS | HEIGHT: 66 IN | BODY MASS INDEX: 34.23 KG/M2 | DIASTOLIC BLOOD PRESSURE: 90 MMHG

## 2021-05-15 VITALS
WEIGHT: 213 LBS | BODY MASS INDEX: 34.23 KG/M2 | HEART RATE: 90 BPM | SYSTOLIC BLOOD PRESSURE: 150 MMHG | HEIGHT: 66 IN | DIASTOLIC BLOOD PRESSURE: 74 MMHG | OXYGEN SATURATION: 97 %

## 2021-05-15 VITALS
HEIGHT: 66 IN | DIASTOLIC BLOOD PRESSURE: 80 MMHG | BODY MASS INDEX: 35.68 KG/M2 | SYSTOLIC BLOOD PRESSURE: 164 MMHG | WEIGHT: 222 LBS

## 2021-05-15 VITALS
WEIGHT: 214 LBS | HEIGHT: 66 IN | DIASTOLIC BLOOD PRESSURE: 72 MMHG | SYSTOLIC BLOOD PRESSURE: 142 MMHG | BODY MASS INDEX: 34.39 KG/M2

## 2021-05-16 VITALS
WEIGHT: 225 LBS | BODY MASS INDEX: 36.16 KG/M2 | HEART RATE: 87 BPM | RESPIRATION RATE: 20 BRPM | HEIGHT: 66 IN | SYSTOLIC BLOOD PRESSURE: 142 MMHG | OXYGEN SATURATION: 97 % | TEMPERATURE: 98 F | HEART RATE: 130 BPM | DIASTOLIC BLOOD PRESSURE: 80 MMHG

## 2021-05-16 VITALS
BODY MASS INDEX: 36.32 KG/M2 | HEIGHT: 66 IN | WEIGHT: 226 LBS | SYSTOLIC BLOOD PRESSURE: 172 MMHG | DIASTOLIC BLOOD PRESSURE: 80 MMHG

## 2021-05-16 VITALS
BODY MASS INDEX: 36 KG/M2 | DIASTOLIC BLOOD PRESSURE: 72 MMHG | SYSTOLIC BLOOD PRESSURE: 144 MMHG | HEIGHT: 66 IN | WEIGHT: 224 LBS

## 2021-05-16 VITALS
HEIGHT: 66 IN | BODY MASS INDEX: 34.87 KG/M2 | SYSTOLIC BLOOD PRESSURE: 140 MMHG | WEIGHT: 217 LBS | DIASTOLIC BLOOD PRESSURE: 70 MMHG

## 2021-06-05 NOTE — PROGRESS NOTES
Progress Note      Patient Name: Charis Hill   Patient ID: [id number]   Sex: Female   YOB: 1950    Primary Care Provider: Redd Magdaleno III MD   Referring Provider: Redd Magdaleno III MD    Visit Date: May 10, 2021    Provider: Redd Magdaleno III MD   Location: [facility]   Location Address: [address] [place], [place] [id number]   Location Phone: [phone]          Chief Complaint  · painful urination and frequency      History Of Present Illness  Charis Hill is a 70 year old /White female who presents for evaluation and treatment of: here today c/o painful urination and frequency.      HPI     patient is a 70 year old has some dysuria and frequency.  No chills no fever.  Not allergic to any medicines    Review of systems     General has been feeling well work is going well.  Respiratory no shortness of breath dyspnea exertion  Cardiovascular chest pain no palpitations   burning and frequency.    Physical exam     weight is 216 is no weight gain or loss.  Blood pressure 1 3475 pulse ox is 94 heart rate is 102  General no distress  Abdomen soft nontender no hepatosplenomegaly  No CVA tenderness.    Assessment     dysuria urinalysis shows positive nitrates leukocytes probably lower tract UTI    Plan     Macrobid 100 mg twice daily give for 5days    needs to call us back on Wednesday for the culture.       Past Medical History  Disease Name Date Onset Notes   Advance directive declined by patient 02/26/2020 --    Aphagia 08/14/2019 --    Diabetes Mellitus, Type II --  --    Hyperlipidemia --  --    Hypertension --  --    Insulin dependent type 2 diabetes mellitus, uncontrolled 02/26/2020 --    Medication management 02/26/2020 --    Refused pneumococcal vaccination 02/26/2020 --          Medication List  Name Date Started Instructions   amlodipine 10 mg oral tablet 03/15/2021 take 1 tablet (10 mg) by oral route once daily for 90 days  "  aspirin 81 mg oral tablet,delayed release (DR/EC)  take 1 tablet (81 mg) by oral route once daily   benazepril 40 mg oral tablet 03/15/2021 TAKE 1 TABLET DAILY   clonidine HCl 0.1 mg oral tablet 03/15/2021 take 1 tablet (0.1 mg) by oral route 2 times per day for 90 days   doxazosin 4 mg oral tablet 03/15/2021 take 1 tablet (4 mg) by oral route once daily for 90 days   Humalog KwikPen Insulin 100 unit/mL subcutaneous insulin pen 03/15/2021 inject by subq route per sliding scale 8u+2u. 150-199=8u, 200-249=10u, 250-299=12u, 300-349=14u, 350-399=16u, 400-449=18u, 450-499=20u   Lantus Solostar U-100 Insulin 100 unit/mL (3 mL) subcutaneous insulin pen 03/15/2021 INJECT 28 UNITS DAILY   metformin 500 mg oral tablet extended release 24 hr 03/15/2021 TAKE ONE TABLET BY MOUTH DAILY WITH THE EVENING MEAL   metoprolol succinate 25 mg oral tablet extended release 24 hr 03/15/2021 TAKE 1 TABLET DAILY   omeprazole 40 mg oral capsule,delayed release(DR/EC) 03/15/2021 TAKE ONE CAPSULE BY MOUTH DAILY BEFORE A MEAL   Ozempic 1 mg/dose (2 mg/1.5 mL) subcutaneous pen injector 10/28/2020 inject 0.5 milliliter by subcutaneous route once a week   Pen Needle 31 gauge x 1/4\" miscellaneous needle 10/30/2019 use with insulin up to 6 times a day         Allergy List  Allergen Name Date Reaction Notes   NO KNOWN DRUG ALLERGIES --  --  --        Allergies Reconciled  Family Medical History  Disease Name Relative/Age Notes   Heart Disease Mother/   --    CVA (Cerebrovascular accident) Mother/   --    Diabetes, unspecified type Father/63   --          Social History  Finding Status Start/Stop Quantity Notes   Active but no formal exercise --  --/-- --  walking   Advance directive declined by patient --  --/-- --  --    Alcohol Never --/-- --  --     --  --/-- --  --    Tobacco Never --/-- --  --          Immunizations  NameDate Admin Mfg Trade Name Lot Number Route Inj VIS Given VIS Publication   InfluenzaRefused 03/15/2021 NE Not Entered  " "NE NE     Comments:          Review of Systems  · Constitutional  o * See HPI  · Eyes  o * See HPI  · HENT  o * See HPI  · Breasts  o * See HPI  · Cardiovascular  o * See HPI  · Respiratory  o * See HPI  · Gastrointestinal  o * See HPI  · Genitourinary  o * See HPI  · Integument  o * See HPI  · Neurologic  o * See HPI  · Musculoskeletal  o * See HPI  · Endocrine  o * See HPI  · Psychiatric  o * See HPI  · Heme-Lymph  o * See HPI  · Allergic-Immunologic  o * See HPI      Vitals  Date Time BP Position Site L\R Cuff Size HR RR TEMP (F) WT  HT  BMI kg/m2 BSA m2 O2 Sat FR L/min FiO2 HC       05/10/2021 11:00 /70 Sitting    102 - R  97.5 216lbs 0oz 5'  6\" 34.86 2.14 94 %  21%              Results  · In-Office Procedures  o Lab procedure  § IOP - Urinalysis without Microscopy (Clinitek) [facility] ([id number])   § Color Ur: Yellow   § Clarity Ur: Cloudy   § Glucose Ur Ql Strip: 500 mg/dL   § Bilirub Ur Ql Strip: Negative   § Ketones Ur Ql Strip: Negative   § Sp Gr Ur Qn: 1.015   § Hgb Ur Ql Strip: Small   § pH Ur-LsCnc: 5.5   § Prot Ur Ql Strip: 30 mg/dL   § Urobilinogen Ur Strip-mCnc: 1.0 E.U./dL   § Nitrite Ur Ql Strip: Positive   § WBC Est Ur Ql Strip: Moderate       Assessment  · Dysuria     788.1/R30.0  · Urinary frequency     788.41/R35.0  · Hematuria     599.70/R31.9  · Abnormal urinalysis     791.9/R82.90    Problems Reconciled  Plan  · Orders  o Urine culture (89096, 78949) - 788.1/R30.0, 788.41/R35.0, 599.70/R31.9, 791.9/R82.90 - 05/10/2021  o ACO-39: Current medications updated and reviewed (1159F, ) - - 05/10/2021  · Medications  o Macrobid 100 mg oral capsule   SIG: take 1 capsule (100 mg) by oral route every 12 hours with food for 7 days   DISP: (14) Capsule with 0 refills  Prescribed on 05/10/2021     o Bactrim -160 mg oral tablet   SIG: take 1 tablet by oral route every 12 hours for 5 days   DISP: (10) Tablet with 0 refills  Discontinued on 05/10/2021     o Medications have been " Reconciled  o Transition of Care or Provider Policy  · Instructions  o Patient is taking medications as prescribed and doing well.   o Take all medications as prescribed/directed.  o Patient instructed/educated on their diet and exercise program.  o Patient was educated/instructed on their diagnosis, treatment and medications prior to discharge from the clinic today.  o Bring all medicines with their bottles to each office visit.  o Time spent with the patient was 10 minutes, more than 50% face to face.  o Chronic conditions reviewed and taken into consideration for today's treatment plan.  o Discussed Covid-19 precautions including, but not limited to, social distancing, avoid touching your face, and hand washing.             Electronically Signed by: Shereen Love, -Author on May 11, 2021 11:47:07 AM  Electronically Co-signed by: Redd Magdaleno III MD -Reviewer on May 11, 2021 01:24:43 PM

## 2021-06-11 ENCOUNTER — PATIENT OUTREACH (OUTPATIENT)
Dept: CASE MANAGEMENT | Facility: OTHER | Age: 71
End: 2021-06-11

## 2021-06-11 DIAGNOSIS — E11.65 TYPE 2 DIABETES MELLITUS WITH HYPERGLYCEMIA, WITH LONG-TERM CURRENT USE OF INSULIN (HCC): Primary | ICD-10-CM

## 2021-06-11 DIAGNOSIS — Z79.4 TYPE 2 DIABETES MELLITUS WITH HYPERGLYCEMIA, WITH LONG-TERM CURRENT USE OF INSULIN (HCC): Primary | ICD-10-CM

## 2021-06-11 DIAGNOSIS — I10 HYPERTENSION, UNSPECIFIED TYPE: ICD-10-CM

## 2021-06-11 PROCEDURE — 99490 CHRNC CARE MGMT STAFF 1ST 20: CPT | Performed by: FAMILY MEDICINE

## 2021-07-08 ENCOUNTER — PATIENT OUTREACH (OUTPATIENT)
Dept: CASE MANAGEMENT | Facility: OTHER | Age: 71
End: 2021-07-08

## 2021-07-08 DIAGNOSIS — Z79.4 TYPE 2 DIABETES MELLITUS WITH HYPERGLYCEMIA, WITH LONG-TERM CURRENT USE OF INSULIN (HCC): Primary | ICD-10-CM

## 2021-07-08 DIAGNOSIS — E11.65 TYPE 2 DIABETES MELLITUS WITH HYPERGLYCEMIA, WITH LONG-TERM CURRENT USE OF INSULIN (HCC): Primary | ICD-10-CM

## 2021-07-08 NOTE — OUTREACH NOTE
CCM Interim Update    Called Ms Hill for CCM outreach. Left message on voicemail to call office.

## 2021-07-12 PROBLEM — IMO0002 INSULIN DEPENDENT TYPE 2 DIABETES MELLITUS, UNCONTROLLED: Status: ACTIVE | Noted: 2020-02-26

## 2021-07-12 PROBLEM — R13.0 APHAGIA: Status: ACTIVE | Noted: 2019-08-14

## 2021-07-12 PROBLEM — Z78.9 ADVANCE DIRECTIVE DECLINED BY PATIENT: Status: ACTIVE | Noted: 2020-02-26

## 2021-07-12 RX ORDER — PEN NEEDLE, DIABETIC 30 GX3/16"
1 NEEDLE, DISPOSABLE MISCELLANEOUS
COMMUNITY

## 2021-07-12 RX ORDER — AMLODIPINE BESYLATE 10 MG/1
10 TABLET ORAL DAILY
COMMUNITY
End: 2022-04-04 | Stop reason: SDUPTHER

## 2021-07-12 RX ORDER — DOXAZOSIN MESYLATE 4 MG/1
4 TABLET ORAL NIGHTLY
COMMUNITY
End: 2022-09-01

## 2021-07-12 RX ORDER — INSULIN LISPRO 100 [IU]/ML
8 INJECTION, SOLUTION INTRAVENOUS; SUBCUTANEOUS
COMMUNITY
End: 2022-04-04 | Stop reason: SDUPTHER

## 2021-07-12 RX ORDER — CLONIDINE HYDROCHLORIDE 0.1 MG/1
0.1 TABLET ORAL 2 TIMES DAILY
COMMUNITY
End: 2021-10-27

## 2021-07-12 RX ORDER — ASPIRIN 81 MG/1
81 TABLET ORAL DAILY
COMMUNITY

## 2021-07-12 RX ORDER — METFORMIN HYDROCHLORIDE 500 MG/1
500 TABLET, EXTENDED RELEASE ORAL
COMMUNITY
End: 2021-10-27

## 2021-07-12 RX ORDER — OMEPRAZOLE 40 MG/1
40 CAPSULE, DELAYED RELEASE ORAL DAILY
COMMUNITY
End: 2021-10-27

## 2021-07-12 RX ORDER — BENAZEPRIL HYDROCHLORIDE 40 MG/1
40 TABLET, FILM COATED ORAL DAILY
COMMUNITY
End: 2022-04-04 | Stop reason: SDUPTHER

## 2021-07-13 ENCOUNTER — OFFICE VISIT (OUTPATIENT)
Dept: FAMILY MEDICINE CLINIC | Facility: CLINIC | Age: 71
End: 2021-07-13

## 2021-07-13 VITALS
HEART RATE: 105 BPM | BODY MASS INDEX: 33.91 KG/M2 | WEIGHT: 211 LBS | HEIGHT: 66 IN | DIASTOLIC BLOOD PRESSURE: 60 MMHG | OXYGEN SATURATION: 94 % | TEMPERATURE: 97.4 F | SYSTOLIC BLOOD PRESSURE: 130 MMHG

## 2021-07-13 DIAGNOSIS — R30.0 DYSURIA: Primary | ICD-10-CM

## 2021-07-13 DIAGNOSIS — Z28.21 COVID-19 VACCINATION REFUSED: ICD-10-CM

## 2021-07-13 LAB
BILIRUB BLD-MCNC: NEGATIVE MG/DL
CLARITY, POC: ABNORMAL
COLOR UR: YELLOW
GLUCOSE UR STRIP-MCNC: NEGATIVE MG/DL
KETONES UR QL: NEGATIVE
LEUKOCYTE EST, POC: ABNORMAL
NITRITE UR-MCNC: POSITIVE MG/ML
PH UR: 6 [PH] (ref 5–8)
PROT UR STRIP-MCNC: NEGATIVE MG/DL
RBC # UR STRIP: ABNORMAL /UL
SP GR UR: 1.01 (ref 1–1.03)
UROBILINOGEN UR QL: NORMAL

## 2021-07-13 PROCEDURE — 81003 URINALYSIS AUTO W/O SCOPE: CPT | Performed by: FAMILY MEDICINE

## 2021-07-13 PROCEDURE — 87086 URINE CULTURE/COLONY COUNT: CPT | Performed by: FAMILY MEDICINE

## 2021-07-13 PROCEDURE — 99213 OFFICE O/P EST LOW 20 MIN: CPT | Performed by: FAMILY MEDICINE

## 2021-07-13 RX ORDER — NITROFURANTOIN 25; 75 MG/1; MG/1
100 CAPSULE ORAL 2 TIMES DAILY
Qty: 10 CAPSULE | Refills: 1 | Status: SHIPPED | OUTPATIENT
Start: 2021-07-13 | End: 2022-04-04

## 2021-07-13 RX ORDER — METOPROLOL SUCCINATE 25 MG/1
25 TABLET, EXTENDED RELEASE ORAL DAILY
COMMUNITY
End: 2022-09-01

## 2021-07-13 NOTE — PROGRESS NOTES
"Chief Complaint  Urinary Tract Infection, Urinary Frequency, and Difficulty Urinating (burning with urination x 2 weeks)    Subjective          Charis Hill presents to [facility]  History of Present Illness  71-year-old with diabetes so aphasia dysuria and frequency no fever no chills does not feel better    No Known Allergies     No past surgical history on file.    Social History     Tobacco Use   • Smoking status: Never Smoker   • Smokeless tobacco: Never Used   Substance Use Topics   • Alcohol use: Never       Family History   Problem Relation Age of Onset   • Other Mother         CVA (Cerebrovascular accident)   • Heart disease Mother    • Diabetes Father 63        Health Maintenance Due   Topic Date Due   • MAMMOGRAM  Never done   • DXA SCAN  Never done   • COLORECTAL CANCER SCREENING  Never done   • COVID-19 Vaccine (1) Never done   • TDAP/TD VACCINES (1 - Tdap) Never done   • ZOSTER VACCINE (1 of 2) Never done   • Pneumococcal Vaccine 65+ (1 of 1 - PPSV23) Never done   • HEPATITIS C SCREENING  Never done   • DIABETIC FOOT EXAM  Never done   • DIABETIC EYE EXAM  05/20/2021      Last Completed Colonoscopy     This patient has no relevant Health Maintenance data.          Review of Systems    frequency dysuria  Objective     Vitals:    07/13/21 1226   BP: 130/60   Pulse: 105   Temp: 97.4 °F (36.3 °C)   SpO2: 94%   Weight: 95.7 kg (211 lb)   Height: 167.6 cm (66\")     Body mass index is 34.06 kg/m².      Physical Exam  General no distress  Cardiovascular regular rhythm no murmur  Respiratory lungs clear and equal bilaterally  Abdomen no CVA tenderness abdomen soft nontender    Urinalysis shows moderate leukocytes and positive nitrates  Result Review :              Assessment and Plan    Probably UTI we will do a urine culture will start her on Macrobid 100 twice daily 5 days were given                Patient was given instructions and counseling regarding her condition or " for health maintenance advice. Please see specific information pulled into the AVS if appropriate.

## 2021-07-15 VITALS
BODY MASS INDEX: 34.72 KG/M2 | WEIGHT: 216 LBS | DIASTOLIC BLOOD PRESSURE: 70 MMHG | OXYGEN SATURATION: 94 % | SYSTOLIC BLOOD PRESSURE: 134 MMHG | HEIGHT: 66 IN | HEART RATE: 102 BPM | TEMPERATURE: 97.5 F

## 2021-07-15 LAB
BACTERIA UR CULT: ABNORMAL
BACTERIA UR CULT: ABNORMAL
OTHER ANTIBIOTIC SUSC ISLT: ABNORMAL

## 2021-07-23 ENCOUNTER — PATIENT OUTREACH (OUTPATIENT)
Dept: CASE MANAGEMENT | Facility: OTHER | Age: 71
End: 2021-07-23

## 2021-07-23 DIAGNOSIS — E11.65 TYPE 2 DIABETES MELLITUS WITH HYPERGLYCEMIA, WITH LONG-TERM CURRENT USE OF INSULIN (HCC): Primary | ICD-10-CM

## 2021-07-23 DIAGNOSIS — I10 HYPERTENSION, UNSPECIFIED TYPE: ICD-10-CM

## 2021-07-23 DIAGNOSIS — Z79.4 TYPE 2 DIABETES MELLITUS WITH HYPERGLYCEMIA, WITH LONG-TERM CURRENT USE OF INSULIN (HCC): Primary | ICD-10-CM

## 2021-07-30 ENCOUNTER — PATIENT OUTREACH (OUTPATIENT)
Dept: CASE MANAGEMENT | Facility: OTHER | Age: 71
End: 2021-07-30

## 2021-07-30 DIAGNOSIS — E11.65 TYPE 2 DIABETES MELLITUS WITH HYPERGLYCEMIA, WITH LONG-TERM CURRENT USE OF INSULIN (HCC): Primary | ICD-10-CM

## 2021-07-30 DIAGNOSIS — I10 HYPERTENSION, UNSPECIFIED TYPE: ICD-10-CM

## 2021-07-30 DIAGNOSIS — Z79.4 TYPE 2 DIABETES MELLITUS WITH HYPERGLYCEMIA, WITH LONG-TERM CURRENT USE OF INSULIN (HCC): Primary | ICD-10-CM

## 2021-08-09 ENCOUNTER — PATIENT OUTREACH (OUTPATIENT)
Dept: CASE MANAGEMENT | Facility: OTHER | Age: 71
End: 2021-08-09

## 2021-08-09 DIAGNOSIS — E11.65 TYPE 2 DIABETES MELLITUS WITH HYPERGLYCEMIA, WITH LONG-TERM CURRENT USE OF INSULIN (HCC): Primary | ICD-10-CM

## 2021-08-09 DIAGNOSIS — Z79.4 TYPE 2 DIABETES MELLITUS WITH HYPERGLYCEMIA, WITH LONG-TERM CURRENT USE OF INSULIN (HCC): Primary | ICD-10-CM

## 2021-08-09 DIAGNOSIS — I10 HYPERTENSION, UNSPECIFIED TYPE: ICD-10-CM

## 2021-09-14 ENCOUNTER — TELEPHONE (OUTPATIENT)
Dept: CASE MANAGEMENT | Facility: OTHER | Age: 71
End: 2021-09-14

## 2021-09-14 ENCOUNTER — PATIENT OUTREACH (OUTPATIENT)
Dept: CASE MANAGEMENT | Facility: OTHER | Age: 71
End: 2021-09-14

## 2021-09-14 DIAGNOSIS — Z79.4 TYPE 2 DIABETES MELLITUS WITH HYPERGLYCEMIA, WITH LONG-TERM CURRENT USE OF INSULIN (HCC): Primary | ICD-10-CM

## 2021-09-14 DIAGNOSIS — E11.65 TYPE 2 DIABETES MELLITUS WITH HYPERGLYCEMIA, WITH LONG-TERM CURRENT USE OF INSULIN (HCC): Primary | ICD-10-CM

## 2021-09-14 DIAGNOSIS — I10 HYPERTENSION, UNSPECIFIED TYPE: ICD-10-CM

## 2021-09-14 NOTE — OUTREACH NOTE
[facility] Interim Update    Ms Hill contacted me requesting refill on Lantus insulin. She reports she does not need refills on any other medications. She admits to not taking Ozempic. Does not want any samples. I encouraged her to take all medications as ordered and the importance of keeping blood sugar level controlled. She is aware of the need to watch her diet and decrease carbs and sugars. This has been reinforced multiple times by myself and Dr Magdaleno. Her next PCP follow up is 10/4/2021.

## 2021-09-22 ENCOUNTER — PATIENT OUTREACH (OUTPATIENT)
Dept: CASE MANAGEMENT | Facility: OTHER | Age: 71
End: 2021-09-22

## 2021-09-22 DIAGNOSIS — IMO0002 INSULIN DEPENDENT TYPE 2 DIABETES MELLITUS, UNCONTROLLED: Primary | ICD-10-CM

## 2021-09-22 DIAGNOSIS — I10 HYPERTENSION, UNSPECIFIED TYPE: ICD-10-CM

## 2021-09-22 DIAGNOSIS — E78.5 HYPERLIPIDEMIA, UNSPECIFIED HYPERLIPIDEMIA TYPE: ICD-10-CM

## 2021-09-22 NOTE — OUTREACH NOTE
[facility] End of Month Documentation    This Chronic Medical Management Care Plan for Charis Hill, 71 y.o. female, has been reviewed;monitored and managed and a new plan of care implemented for the month of September.  A cumulative time of 26  minutes was spent on this patient record this month, including phone call with patient;chart review;electronic communication with pharmacist.    Regarding the patient's problems: has Insulin dependent type 2 diabetes mellitus, uncontrolled (CMS/[facility]); Hypertension; Hyperlipidemia; Aphagia; and Advance directive declined by patient on their problem list., the following items were addressed: medications and any changes can be found within the plan section of the note.  A detailed listing of time spent for chronic care management is tracked within each outreach encounter.  Current medications include:  has a current medication list which includes the following prescription(s): amlodipine, aspirin, benazepril, clonidine, doxazosin, insulin glargine, insulin lispro (1 unit dial), pen needles, metformin er, metoprolol succinate xl, nitrofurantoin (macrocrystal-monohydrate), omeprazole, and semaglutide (1 mg/dose). and the patient is reported to be patient is noncompliant with medication protocol, she does not take insuling as ordered,  Medications are reported to be she is aware symptoms cannot be controlled if not taking medications as ordered.  Regarding these diagnoses, referrals were made to the following provider(s):  none.  All notes on chart for PCP to review.    The patient was monitored remotely for blood glucose;activity level;medications;blood pressure.    The patient's physical needs include:  needs met.     The patient's mental support needs include:  needs met    The patient's cognitive support needs include:  needs met    The patient's psychosocial support needs include:  No data recorded    The patient's functional needs include: needs met    The patient's  environmental needs include:  no needs    Care Plan overall comments:  No data recorded    Refer to previous outreach notes for more information on the areas listed above.    Monthly Billing Diagnoses  (E11.65,  Z79.4) Insulin dependent type 2 diabetes mellitus, uncontrolled (CMS/[facility])    (I10) Hypertension, unspecified type    (E78.5) Hyperlipidemia, unspecified hyperlipidemia type    Medications   · Medications have been reconciled    Care Plan progress this month:      Recently Modified Care Plans Updates made since 8/22/2021 12:00 AM     Diabetes         Problem Priority Last Modified     MEDICATION ADHERENCE --  6/11/2021  8:22 AM by Sachi Reynolds RN              Goal Recent Progress Last Modified     Consistently take medications as prescribed --  6/11/2021  8:22 AM by Sachi Reynolds RN              Task Due Date Last Modified     Discuss barriers to medication adherence with patient --  9/22/2021  9:44 AM by Sachi Reynolds RN        Educate patient on frequency and refill details of meds --  9/22/2021  9:44 AM by Sachi Reynolds RN              Problem Priority Last Modified     PATIENT IS INACTIVE --  6/11/2021  8:22 AM by Sachi Reynolds RN              Goal Recent Progress Last Modified     Exercise at least 20 minutes per day --  6/11/2021  8:22 AM by Sachi Reynolds RN              Task Due Date Last Modified     Discuss barriers to activity with patient. Update SDOH. --  9/22/2021  9:44 AM by Sachi Reynolds RN        Develop exercise plan with patient --  9/22/2021  9:44 AM by Sachi Reynolds RN                 Hypertension         Problem Priority Last Modified     LIFESTYLE CHOICES --  6/11/2021  8:22 AM by Sachi Reynolds RN              Goal Recent Progress Last Modified     Healthy food choice --  6/11/2021  8:22 AM by Sachi Reynolds RN              Task Due Date Last Modified     Provide resources for diet management such as calorie counting and sodium reduction --  9/14/2021  8:19 AM by Sachi Reynolds  RN        Provide community resources for healthy food options --  9/14/2021  8:19 AM by Sachi Reynolds RN        Discuss healthy food options and preferred restaurants --  9/14/2021  8:19 AM by Sachi Reynolds RN              Problem Priority Last Modified     SODIUM INTAKE --  6/11/2021  8:22 AM by Sachi Reynolds RN              Goal Recent Progress Last Modified     Patient will maintain management of sodium consumption --  6/11/2021  8:22 AM by Sachi Reynolds RN              Task Due Date Last Modified     Educate patient on daily sodium intake & how sodium affects the heart --  9/14/2021  8:19 AM by Sachi Reynolds RN        Instruct patient to eat less salt and watch fluids. No added salt or no more than 2 grams (2000mgs) of sodium or 2 liters of fluid in a 24-hour period, or follow provider's specific recommendations. --  9/14/2021  8:19 AM by Sachi Reynolds RN        Educate patient how to read a nutrition label pointing out servings and serving size --  9/14/2021  8:19 AM by Sachi Reynolds RN        Educate patient on sodium alternatives --  9/14/2021  8:19 AM by Sachi Reynolds RN              Problem Priority Last Modified     BLOOD PRESSURE MONITORING --  6/11/2021  8:22 AM by Sachi Reynolds RN              Goal Recent Progress Last Modified     Establish Plan for Regular Lab Work --  6/11/2021  8:22 AM by Sachi Reynolds RN              Task Due Date Last Modified     Track patient LDL and HDL levels --  9/14/2021  8:19 AM by Sachi Reynolds RN        Track patient serum potassium and serum creatinine levels --  9/14/2021  8:20 AM by Sachi Reynolds RN        Review the patients last urine protein. Discuss need if patient has not had one. --  9/14/2021  8:20 AM by Sachi Reynolds RN              Goal Recent Progress Last Modified     Establish Regular Follow-Ups with PCP --  6/11/2021  8:22 AM by Sachi Reynolds RN              Task Due Date Last Modified     Refer patient to PCP --  9/14/2021  8:20 AM by  Sachi Reynolds RN        Determine patient's next PCP visit --  9/14/2021  8:20 AM by Sachi Reynolds RN        Discuss schedule for PCP visits with patient --  9/14/2021  8:20 AM by Sachi Reynolds RN              Problem Priority Last Modified     MEDICATION ADHERENCE --  6/11/2021  8:22 AM by Sachi Reynolds RN              Goal Recent Progress Last Modified     Consistently take medications as prescribed --  6/11/2021  8:22 AM by Sachi Reynolds RN              Task Due Date Last Modified     Discuss barriers to medication adherence with patient --  9/14/2021  8:20 AM by Sachi Reynolds RN        Educate patient on frequency and refill details of meds --  9/14/2021  8:20 AM by Sachi Reynolds RN        Assist patients in setting up daily notes/alarms for meds. Consider pill box use --  9/14/2021  8:20 AM by Sachi Reynolds RN              Problem Priority Last Modified     PATIENT IS INACTIVE --  6/11/2021  8:22 AM by Sachi Reynolds RN              Goal Recent Progress Last Modified     Exercise at least 20 minutes per day --  6/11/2021  8:22 AM by Sachi Reynolds RN              Task Due Date Last Modified     Discuss barriers to activity with patient. Update SDOH. --  9/14/2021  8:20 AM by Sachi Reynolds RN        Develop exercise plan with patient --  9/14/2021  8:20 AM by Sachi Reynolds RN              Problem Priority Last Modified     PATIENT IS HYPERTENSIVE --  6/11/2021  8:22 AM by Sachi Reynolds RN              Goal Recent Progress Last Modified     Remain at or Below Target Blood Pressure --  6/11/2021  8:22 AM by Sachi Reynolds RN              Task Due Date Last Modified     Establish a target blood pressure with the patient in conjunction with PCP --  9/14/2021  8:20 AM by Sachi Reynolds RN        Discuss steps to manage BP with patient --  9/14/2021  8:20 AM by Sachi Reynolds RN        Educate on disease process and complications associated with noncompliance --  9/14/2021  8:20 AM by Sachi Reynolds RN         Educate on keeping a log --  9/14/2021  8:20 AM by Sachi Reynolds, RN                      Instructions   · Patient was provided an electronic copy of care plan  · CCM services were explained and offered and patient has accepted these services.  · Patient has given their written consent to receive CCM services and understands that this includes the authorization of electronic communication of medical information with the other treating providers.  · Patient understands that they may stop CCM services at any time and these changes will be effective at the end of the calendar month and will effectively revocate the agreement of CCM services.  · Patient understands that only one practitioner can furnish and be paid for CCM services during one calendar month.  Patient also understands that there may be co-payment or deductible fees in association with CCM services.  · Patient will continue with at least monthly follow-up calls with the Nurse Navigator.    Sachi Reynolds RN  Ambulatory Case Management    9/22/2021, 09:48 EDT

## 2021-10-04 ENCOUNTER — OFFICE VISIT (OUTPATIENT)
Dept: FAMILY MEDICINE CLINIC | Facility: CLINIC | Age: 71
End: 2021-10-04

## 2021-10-04 VITALS
OXYGEN SATURATION: 97 % | WEIGHT: 208 LBS | SYSTOLIC BLOOD PRESSURE: 142 MMHG | HEIGHT: 66 IN | TEMPERATURE: 97.7 F | DIASTOLIC BLOOD PRESSURE: 84 MMHG | HEART RATE: 68 BPM | BODY MASS INDEX: 33.43 KG/M2

## 2021-10-04 DIAGNOSIS — Z79.4 TYPE 2 DIABETES MELLITUS WITH DIABETIC NEUROPATHY, WITH LONG-TERM CURRENT USE OF INSULIN (HCC): Primary | ICD-10-CM

## 2021-10-04 DIAGNOSIS — E11.40 TYPE 2 DIABETES MELLITUS WITH DIABETIC NEUROPATHY, WITH LONG-TERM CURRENT USE OF INSULIN (HCC): Primary | ICD-10-CM

## 2021-10-04 LAB
ALBUMIN SERPL-MCNC: 4.1 G/DL (ref 3.5–5.2)
ALBUMIN/GLOB SERPL: 1.1 G/DL
ALP SERPL-CCNC: 114 U/L (ref 39–117)
ALT SERPL W P-5'-P-CCNC: 14 U/L (ref 1–33)
ANION GAP SERPL CALCULATED.3IONS-SCNC: 6 MMOL/L (ref 5–15)
AST SERPL-CCNC: 20 U/L (ref 1–32)
BILIRUB SERPL-MCNC: 0.4 MG/DL (ref 0–1.2)
BUN SERPL-MCNC: 7 MG/DL (ref 8–23)
BUN/CREAT SERPL: 6.9 (ref 7–25)
CALCIUM SPEC-SCNC: 9.8 MG/DL (ref 8.6–10.5)
CHLORIDE SERPL-SCNC: 105 MMOL/L (ref 98–107)
CHOLEST SERPL-MCNC: 190 MG/DL (ref 0–200)
CO2 SERPL-SCNC: 27 MMOL/L (ref 22–29)
CREAT SERPL-MCNC: 1.01 MG/DL (ref 0.57–1)
GFR SERPL CREATININE-BSD FRML MDRD: 54 ML/MIN/1.73
GLOBULIN UR ELPH-MCNC: 3.6 GM/DL
GLUCOSE SERPL-MCNC: 177 MG/DL (ref 65–99)
HBA1C MFR BLD: 7.6 % (ref 4.8–5.6)
HDLC SERPL-MCNC: 56 MG/DL (ref 40–60)
LDLC SERPL CALC-MCNC: 114 MG/DL (ref 0–100)
LDLC/HDLC SERPL: 2 {RATIO}
POTASSIUM SERPL-SCNC: 5 MMOL/L (ref 3.5–5.2)
PROT SERPL-MCNC: 7.7 G/DL (ref 6–8.5)
SODIUM SERPL-SCNC: 138 MMOL/L (ref 136–145)
TRIGL SERPL-MCNC: 110 MG/DL (ref 0–150)
VLDLC SERPL-MCNC: 20 MG/DL (ref 5–40)

## 2021-10-04 PROCEDURE — 80053 COMPREHEN METABOLIC PANEL: CPT | Performed by: FAMILY MEDICINE

## 2021-10-04 PROCEDURE — 99214 OFFICE O/P EST MOD 30 MIN: CPT | Performed by: FAMILY MEDICINE

## 2021-10-04 PROCEDURE — 36415 COLL VENOUS BLD VENIPUNCTURE: CPT | Performed by: FAMILY MEDICINE

## 2021-10-04 PROCEDURE — 83036 HEMOGLOBIN GLYCOSYLATED A1C: CPT | Performed by: FAMILY MEDICINE

## 2021-10-04 PROCEDURE — 80061 LIPID PANEL: CPT | Performed by: FAMILY MEDICINE

## 2021-10-04 NOTE — PROGRESS NOTES
"Chief Complaint  Follow-up and Numbness (pt havinmg numbness in toes )    Subjective          Charis Hill presents to [facility]  History of Present Illness  71-year-old longstanding diabetes on insulin numbness in her toes is worsened    No Known Allergies     No past surgical history on file.    Social History     Tobacco Use   • Smoking status: Never Smoker   • Smokeless tobacco: Never Used   Substance Use Topics   • Alcohol use: Never       Family History   Problem Relation Age of Onset   • Other Mother         CVA (Cerebrovascular accident)   • Heart disease Mother    • Diabetes Father 63        Health Maintenance Due   Topic Date Due   • Pneumococcal Vaccine 65+ (1 of 2 - PPSV23) Never done   • COVID-19 Vaccine (1) Never done   • TDAP/TD VACCINES (1 - Tdap) Never done   • ZOSTER VACCINE (1 of 2) Never done   • HEPATITIS C SCREENING  Never done   • DIABETIC FOOT EXAM  Never done   • DIABETIC EYE EXAM  05/20/2021   • LIPID PANEL  06/10/2021   • HEMOGLOBIN A1C  09/15/2021   • INFLUENZA VACCINE  10/01/2021      Last Completed Colonoscopy          Discontinued - COLORECTAL CANCER SCREENING  Discontinued    No completion history exists for this topic.                Review of Systems   Cardiovascular no chest pain no palpitations patient still works full-time has hypertension needs better control decrease salt decrease weight  Respiratory no shortness of breath no dyspnea on exertion patient refuses to take coronavirus vaccine patient also refuses the flu vaccine  Objective     Vitals:    10/04/21 1129   BP: 142/84   BP Location: Left arm   Patient Position: Sitting   Cuff Size: Adult   Pulse: 68   Temp: 97.7 °F (36.5 °C)   SpO2: 97%   Weight: 94.3 kg (208 lb)   Height: 167.6 cm (66\")     Body mass index is 33.57 kg/m².      Physical Exam  General no distress  Cardiovascular regular rhythm no murmur  Respiratory no shortness of breath dyspnea on exertion  Neurologic filament " test feet somewhat unreliable and patient was seen yes numerous times when I was not touching her good pulses bilaterally skin intact no dryness gait is normal patient has her usual aphasia but this is not worsened  Result Review :              Assessment and Plan    Type 2 diabetes #2 diabetic neuropathy peripheral #3 refuses to take coronavirus vaccine and flu shot hypertension may increase her Catapres next visit patient needs an A1c CMP lipid discussed patient's weight plant-based diet                Patient was given instructions and counseling regarding her condition or for health maintenance advice. Please see specific information pulled into the AVS if appropriate.

## 2021-10-15 ENCOUNTER — PATIENT OUTREACH (OUTPATIENT)
Dept: CASE MANAGEMENT | Facility: OTHER | Age: 71
End: 2021-10-15

## 2021-10-15 DIAGNOSIS — I10 HYPERTENSION, UNSPECIFIED TYPE: ICD-10-CM

## 2021-10-15 DIAGNOSIS — IMO0002 INSULIN DEPENDENT TYPE 2 DIABETES MELLITUS, UNCONTROLLED: Primary | ICD-10-CM

## 2021-10-15 DIAGNOSIS — E78.5 HYPERLIPIDEMIA, UNSPECIFIED HYPERLIPIDEMIA TYPE: ICD-10-CM

## 2021-10-15 NOTE — OUTREACH NOTE
[facility] Interim Update    Chart review: last ov 10/4/21. A1C 7.6, BUN 6.9, lipids wnl. Meds: Lantus 28 u evening, Humalog 8u ac, Ozempic 2mg weekly and Metformin 500 mg daily with dinner. Needs diabetic eye exam.

## 2021-10-27 ENCOUNTER — PATIENT MESSAGE (OUTPATIENT)
Dept: FAMILY MEDICINE CLINIC | Facility: CLINIC | Age: 71
End: 2021-10-27

## 2021-10-27 DIAGNOSIS — I10 HYPERTENSION, UNSPECIFIED TYPE: Primary | ICD-10-CM

## 2021-10-27 RX ORDER — CLONIDINE HYDROCHLORIDE 0.1 MG/1
TABLET ORAL
Qty: 180 TABLET | Refills: 3 | Status: SHIPPED | OUTPATIENT
Start: 2021-10-27 | End: 2022-04-04 | Stop reason: SDUPTHER

## 2021-10-27 RX ORDER — OMEPRAZOLE 40 MG/1
CAPSULE, DELAYED RELEASE ORAL
Qty: 90 CAPSULE | Refills: 3 | Status: SHIPPED | OUTPATIENT
Start: 2021-10-27 | End: 2022-04-04 | Stop reason: SDUPTHER

## 2021-10-27 RX ORDER — METFORMIN HYDROCHLORIDE 500 MG/1
TABLET, EXTENDED RELEASE ORAL
Qty: 90 TABLET | Refills: 3 | Status: SHIPPED | OUTPATIENT
Start: 2021-10-27 | End: 2022-04-04 | Stop reason: SDUPTHER

## 2021-10-28 ENCOUNTER — PATIENT OUTREACH (OUTPATIENT)
Dept: CASE MANAGEMENT | Facility: OTHER | Age: 71
End: 2021-10-28

## 2021-10-28 DIAGNOSIS — IMO0002 INSULIN DEPENDENT TYPE 2 DIABETES MELLITUS, UNCONTROLLED: Primary | ICD-10-CM

## 2021-10-28 DIAGNOSIS — I10 HYPERTENSION, UNSPECIFIED TYPE: ICD-10-CM

## 2021-10-28 DIAGNOSIS — E78.5 HYPERLIPIDEMIA, UNSPECIFIED HYPERLIPIDEMIA TYPE: ICD-10-CM

## 2021-10-28 PROCEDURE — 99490 CHRNC CARE MGMT STAFF 1ST 20: CPT | Performed by: FAMILY MEDICINE

## 2021-10-28 NOTE — OUTREACH NOTE
CCM End of Month Documentation        I have been able to contact Ms Hill via [employer]. She had sent me fax requesting refill on Metformin, Omeprazole, Clonidine and that she is not taking other blood pressure meds because if made her b/p go down. I tried to call her and left message on voicemail that I needed call back to clarify her medications. She then sent message via [employer]. Reporting when she took all meds her blood pressure went down to 60/40. I replied back asking for exact medications she was taking. I also requested she make appointment with Dr Magdaleno to discuss b/p readings and medications. I am awaiting her reply. Ms Hill is not compliant with her b/p or diabetic medications. This has been discussed with her many times in the past.      This Chronic Medical Management Care Plan for Charis Hill, 71 y.o. female, has been monitored and managed; reviewed and a new plan of care implemented for the month of October.  A cumulative time of 26  minutes was spent on this patient record this month, including chart review, communicated with Ms Hill vis [employer].    Regarding the patient's problems: has Insulin dependent type 2 diabetes mellitus, uncontrolled (HCC); Hypertension; Hyperlipidemia; Aphagia; and Advance directive declined by patient on their problem list., the following items were addressed: medications and any changes can be found within the plan section of the note.  A detailed listing of time spent for chronic care management is tracked within each outreach encounter.  Current medications include:  has a current medication list which includes the following prescription(s): amlodipine, aspirin, benazepril, clonidine, doxazosin, insulin glargine, insulin lispro (1 unit dial), pen needles, metformin er, metoprolol succinate xl, nitrofurantoin (macrocrystal-monohydrate), omeprazole, and semaglutide (1 mg/dose). and the patient is reported to be patient is noncompliant with medication  protocol, she is not taking insulin or blood pressure meds as ordered. Needs follow up appt scheduled,  Medications are reported to be she is aware symptoms cannot be controlled if not taking medications as ordered.   The patient was monitored remotely for blood pressure; blood glucose; medications.    The patient's physical needs include:  needs met.     The patient's mental support needs include:  needs met    The patient's cognitive support needs include:  needs met    The patient's psychosocial support needs include:  continued support    The patient's functional needs include: needs met    The patient's environmental needs include:  no needs    Care Plan overall comments:  No data recorded    Refer to previous outreach notes for more information on the areas listed above.    Monthly Billing Diagnoses  (E11.65,  Z79.4) Insulin dependent type 2 diabetes mellitus, uncontrolled (HCC)    (I10) Hypertension, unspecified type    (E78.5) Hyperlipidemia, unspecified hyperlipidemia type    Medications   · Medications have been reconciled    Care Plan progress this month:      Recently Modified Care Plans Updates made since 9/27/2021 12:00 AM    No recently modified care plans.        Instructions   · Patient was provided an electronic copy of care plan  · CCM services were explained and offered and patient has accepted these services.  · Patient has given their written consent to receive CCM services and understands that this includes the authorization of electronic communication of medical information with the other treating providers.  · Patient understands that they may stop CCM services at any time and these changes will be effective at the end of the calendar month and will effectively revocate the agreement of CCM services.  · Patient understands that only one practitioner can furnish and be paid for CCM services during one calendar month.  Patient also understands that there may be co-payment or deductible fees in  association with CCM services.  · Patient will continue with at least monthly follow-up calls with the Nurse Navigator.    Sachi Reynolds RN  Ambulatory Case Management    10/28/2021, 08:39 EDT

## 2021-11-09 ENCOUNTER — PATIENT OUTREACH (OUTPATIENT)
Dept: CASE MANAGEMENT | Facility: OTHER | Age: 71
End: 2021-11-09

## 2021-11-09 DIAGNOSIS — E78.5 HYPERLIPIDEMIA, UNSPECIFIED HYPERLIPIDEMIA TYPE: ICD-10-CM

## 2021-11-09 DIAGNOSIS — I10 HYPERTENSION, UNSPECIFIED TYPE: ICD-10-CM

## 2021-11-09 DIAGNOSIS — IMO0002 INSULIN DEPENDENT TYPE 2 DIABETES MELLITUS, UNCONTROLLED: Primary | ICD-10-CM

## 2021-11-09 PROCEDURE — 99490 CHRNC CARE MGMT STAFF 1ST 20: CPT | Performed by: FAMILY MEDICINE

## 2021-11-09 NOTE — OUTREACH NOTE
CCM Interim Update    I have tried to contact Ms Hill by phone, having to leave messages. She will respond via [employer]. She reports blood pressure 132/77. Not checking blood sugars on regular basis. I encouraged her to keep daily logs and to make appointment with Dr Magdaleno to discuss medications. I am trying to contact via [employer] since I cannot reach her by phone.

## 2021-11-12 ENCOUNTER — PATIENT OUTREACH (OUTPATIENT)
Dept: CASE MANAGEMENT | Facility: OTHER | Age: 71
End: 2021-11-12

## 2021-11-12 DIAGNOSIS — I10 HYPERTENSION, UNSPECIFIED TYPE: ICD-10-CM

## 2021-11-12 DIAGNOSIS — IMO0002 INSULIN DEPENDENT TYPE 2 DIABETES MELLITUS, UNCONTROLLED: Primary | ICD-10-CM

## 2021-11-12 NOTE — OUTREACH NOTE
CCM Interim Update    I have called Ms Hill today and left message to return my call regarding her blood pressure medications. She contacted me via [employer] 11/3/21 informing me of her blood pressure reading of 132/77. She is still only taking Clonidine.  I returned her message on 11/9/21(went to wrong pool/person and routed back to me) and asked that she please make appointment with Dr Magdaleno to discuss her medications. I have not her back from her from [employer] or by phone. PCP aware.

## 2021-11-19 ENCOUNTER — PATIENT OUTREACH (OUTPATIENT)
Dept: CASE MANAGEMENT | Facility: OTHER | Age: 71
End: 2021-11-19

## 2021-11-19 DIAGNOSIS — IMO0002 INSULIN DEPENDENT TYPE 2 DIABETES MELLITUS, UNCONTROLLED: Primary | ICD-10-CM

## 2021-11-19 DIAGNOSIS — I10 HYPERTENSION, UNSPECIFIED TYPE: ICD-10-CM

## 2021-11-19 NOTE — OUTREACH NOTE
CCM End of Month Documentation    I was contacted by Ms Hill via [employer]. She reports taking Lantus and Ozempic insulins.  Declines to make appointment to discuss blood pressure medications with Dr Magdaleno.     This Chronic Medical Management Care Plan for Charis Hill, 71 y.o. female, has been monitored and managed; reviewed and a new plan of care implemented for the month of November.  A cumulative time of 33  minutes was spent on this patient record this month, including chart review, communicated with Ms Oliverdany vis [employer].    Regarding the patient's problems: has Insulin dependent type 2 diabetes mellitus, uncontrolled (HCC); Hypertension; Hyperlipidemia; Aphagia; and Advance directive declined by patient on their problem list., the following items were addressed: medications and any changes can be found within the plan section of the note.  A detailed listing of time spent for chronic care management is tracked within each outreach encounter.  Current medications include:  has a current medication list which includes the following prescription(s): amlodipine, aspirin, benazepril, clonidine, doxazosin, insulin glargine, insulin lispro (1 unit dial), pen needles, metformin er, metoprolol succinate xl, nitrofurantoin (macrocrystal-monohydrate), omeprazole, and semaglutide (1 mg/dose). and the patient is reported to be patient is noncompliant with medication protocol, she is not taking insulin or blood pressure meds as ordered. Needs follow up appt scheduled but she refuses,  Medications are reported to be effective, she is aware symptoms cannot be controlled if not taking medications as ordered.   The patient was monitored remotely for blood pressure; blood glucose; medications.    The patient's physical needs include:  needs met.     The patient's mental support needs include:  needs met    The patient's cognitive support needs include:  needs met    The patient's psychosocial support needs include:   continued support    The patient's functional needs include: needs met    The patient's environmental needs include:  no needs    Care Plan overall comments:  No data recorded    Refer to previous outreach notes for more information on the areas listed above.    Monthly Billing Diagnoses  (E11.65,  Z79.4) Insulin dependent type 2 diabetes mellitus, uncontrolled (HCC)    (I10) Hypertension, unspecified type    Medications   · Medications have been reconciled    Care Plan progress this month:      Recently Modified Care Plans Updates made since 10/19/2021 12:00 AM    No recently modified care plans.          Instructions   · Patient was provided an electronic copy of care plan  · CCM services were explained and offered and patient has accepted these services.  · Patient has given their written consent to receive CCM services and understands that this includes the authorization of electronic communication of medical information with the other treating providers.  · Patient understands that they may stop CCM services at any time and these changes will be effective at the end of the calendar month and will effectively revocate the agreement of CCM services.  · Patient understands that only one practitioner can furnish and be paid for CCM services during one calendar month.  Patient also understands that there may be co-payment or deductible fees in association with CCM services.  · Patient will continue with at least monthly follow-up calls with the Nurse Navigator.    Sachi Reynolds RN  Ambulatory Case Management    11/19/2021, 14:47 EST

## 2022-01-28 ENCOUNTER — PATIENT OUTREACH (OUTPATIENT)
Dept: CASE MANAGEMENT | Facility: OTHER | Age: 72
End: 2022-01-28

## 2022-01-28 DIAGNOSIS — IMO0002 INSULIN DEPENDENT TYPE 2 DIABETES MELLITUS, UNCONTROLLED: Primary | ICD-10-CM

## 2022-01-28 DIAGNOSIS — I10 HYPERTENSION, UNSPECIFIED TYPE: ICD-10-CM

## 2022-01-28 NOTE — OUTREACH NOTE
CCM Interim Update    I have left numerous message on voicemail to return my call. Her next PCP visit is 4/4/2022

## 2022-02-23 ENCOUNTER — PATIENT OUTREACH (OUTPATIENT)
Dept: CASE MANAGEMENT | Facility: OTHER | Age: 72
End: 2022-02-23

## 2022-02-23 DIAGNOSIS — I10 HYPERTENSION, UNSPECIFIED TYPE: ICD-10-CM

## 2022-02-23 DIAGNOSIS — IMO0002 INSULIN DEPENDENT TYPE 2 DIABETES MELLITUS, UNCONTROLLED: Primary | ICD-10-CM

## 2022-04-04 ENCOUNTER — OFFICE VISIT (OUTPATIENT)
Dept: FAMILY MEDICINE CLINIC | Facility: CLINIC | Age: 72
End: 2022-04-04

## 2022-04-04 ENCOUNTER — PATIENT OUTREACH (OUTPATIENT)
Dept: CASE MANAGEMENT | Facility: OTHER | Age: 72
End: 2022-04-04

## 2022-04-04 VITALS
BODY MASS INDEX: 34.07 KG/M2 | TEMPERATURE: 97.6 F | WEIGHT: 212 LBS | OXYGEN SATURATION: 99 % | SYSTOLIC BLOOD PRESSURE: 142 MMHG | HEART RATE: 103 BPM | DIASTOLIC BLOOD PRESSURE: 64 MMHG | HEIGHT: 66 IN

## 2022-04-04 DIAGNOSIS — E11.9 DIABETES MELLITUS, TYPE II, INSULIN DEPENDENT: Primary | ICD-10-CM

## 2022-04-04 DIAGNOSIS — Z79.4 TYPE 2 DIABETES MELLITUS WITH HYPERGLYCEMIA, WITH LONG-TERM CURRENT USE OF INSULIN: ICD-10-CM

## 2022-04-04 DIAGNOSIS — E11.65 TYPE 2 DIABETES MELLITUS WITH HYPERGLYCEMIA, WITH LONG-TERM CURRENT USE OF INSULIN: ICD-10-CM

## 2022-04-04 DIAGNOSIS — I10 PRIMARY HYPERTENSION: ICD-10-CM

## 2022-04-04 DIAGNOSIS — E78.2 MIXED HYPERLIPIDEMIA: ICD-10-CM

## 2022-04-04 DIAGNOSIS — Z79.899 MEDICATION MANAGEMENT: ICD-10-CM

## 2022-04-04 DIAGNOSIS — Z79.4 DIABETES MELLITUS, TYPE II, INSULIN DEPENDENT: Primary | ICD-10-CM

## 2022-04-04 DIAGNOSIS — I10 HYPERTENSION, UNSPECIFIED TYPE: Primary | ICD-10-CM

## 2022-04-04 DIAGNOSIS — I69.851 HEMIPARESIS OF RIGHT DOMINANT SIDE AS LATE EFFECT OF OTHER CEREBROVASCULAR DISEASE: ICD-10-CM

## 2022-04-04 DIAGNOSIS — R13.0 APHAGIA: ICD-10-CM

## 2022-04-04 DIAGNOSIS — E78.5 HYPERLIPIDEMIA, UNSPECIFIED HYPERLIPIDEMIA TYPE: ICD-10-CM

## 2022-04-04 DIAGNOSIS — Z11.1 SCREENING-PULMONARY TB: ICD-10-CM

## 2022-04-04 PROBLEM — R47.9 SPEECH DISTURBANCE: Status: ACTIVE | Noted: 2022-04-04

## 2022-04-04 LAB
ALBUMIN SERPL-MCNC: 4.4 G/DL (ref 3.5–5.2)
ALBUMIN UR-MCNC: 2.7 MG/DL
ALBUMIN/GLOB SERPL: 1.3 G/DL
ALP SERPL-CCNC: 131 U/L (ref 39–117)
ALT SERPL W P-5'-P-CCNC: 15 U/L (ref 1–33)
ANION GAP SERPL CALCULATED.3IONS-SCNC: 13.1 MMOL/L (ref 5–15)
AST SERPL-CCNC: 19 U/L (ref 1–32)
BASOPHILS # BLD AUTO: 0.07 10*3/MM3 (ref 0–0.2)
BASOPHILS NFR BLD AUTO: 0.7 % (ref 0–1.5)
BILIRUB SERPL-MCNC: 0.4 MG/DL (ref 0–1.2)
BUN SERPL-MCNC: 8 MG/DL (ref 8–23)
BUN/CREAT SERPL: 6.9 (ref 7–25)
CALCIUM SPEC-SCNC: 10 MG/DL (ref 8.6–10.5)
CHLORIDE SERPL-SCNC: 102 MMOL/L (ref 98–107)
CHOLEST SERPL-MCNC: 212 MG/DL (ref 0–200)
CO2 SERPL-SCNC: 23.9 MMOL/L (ref 22–29)
CREAT SERPL-MCNC: 1.16 MG/DL (ref 0.57–1)
CREAT UR-MCNC: 81.1 MG/DL
DEPRECATED RDW RBC AUTO: 41.1 FL (ref 37–54)
EGFRCR SERPLBLD CKD-EPI 2021: 50.5 ML/MIN/1.73
EOSINOPHIL # BLD AUTO: 0.12 10*3/MM3 (ref 0–0.4)
EOSINOPHIL NFR BLD AUTO: 1.3 % (ref 0.3–6.2)
ERYTHROCYTE [DISTWIDTH] IN BLOOD BY AUTOMATED COUNT: 13.3 % (ref 12.3–15.4)
GLOBULIN UR ELPH-MCNC: 3.4 GM/DL
GLUCOSE SERPL-MCNC: 251 MG/DL (ref 65–99)
HBA1C MFR BLD: 7.9 % (ref 4.8–5.6)
HCT VFR BLD AUTO: 40.6 % (ref 34–46.6)
HDLC SERPL-MCNC: 65 MG/DL (ref 40–60)
HGB BLD-MCNC: 13.2 G/DL (ref 12–15.9)
IMM GRANULOCYTES # BLD AUTO: 0.03 10*3/MM3 (ref 0–0.05)
IMM GRANULOCYTES NFR BLD AUTO: 0.3 % (ref 0–0.5)
LDLC SERPL CALC-MCNC: 129 MG/DL (ref 0–100)
LDLC/HDLC SERPL: 1.94 {RATIO}
LYMPHOCYTES # BLD AUTO: 2.29 10*3/MM3 (ref 0.7–3.1)
LYMPHOCYTES NFR BLD AUTO: 24.1 % (ref 19.6–45.3)
MCH RBC QN AUTO: 28.1 PG (ref 26.6–33)
MCHC RBC AUTO-ENTMCNC: 32.5 G/DL (ref 31.5–35.7)
MCV RBC AUTO: 86.4 FL (ref 79–97)
MICROALBUMIN/CREAT UR: 33.3 MG/G
MONOCYTES # BLD AUTO: 0.76 10*3/MM3 (ref 0.1–0.9)
MONOCYTES NFR BLD AUTO: 8 % (ref 5–12)
NEUTROPHILS NFR BLD AUTO: 6.25 10*3/MM3 (ref 1.7–7)
NEUTROPHILS NFR BLD AUTO: 65.6 % (ref 42.7–76)
NRBC BLD AUTO-RTO: 0 /100 WBC (ref 0–0.2)
PLATELET # BLD AUTO: 314 10*3/MM3 (ref 140–450)
PMV BLD AUTO: 11.5 FL (ref 6–12)
POTASSIUM SERPL-SCNC: 5 MMOL/L (ref 3.5–5.2)
PROT SERPL-MCNC: 7.8 G/DL (ref 6–8.5)
RBC # BLD AUTO: 4.7 10*6/MM3 (ref 3.77–5.28)
SODIUM SERPL-SCNC: 139 MMOL/L (ref 136–145)
TRIGL SERPL-MCNC: 104 MG/DL (ref 0–150)
TSH SERPL DL<=0.05 MIU/L-ACNC: 2.14 UIU/ML (ref 0.27–4.2)
VLDLC SERPL-MCNC: 18 MG/DL (ref 5–40)
WBC NRBC COR # BLD: 9.52 10*3/MM3 (ref 3.4–10.8)

## 2022-04-04 PROCEDURE — 99490 CHRNC CARE MGMT STAFF 1ST 20: CPT | Performed by: FAMILY MEDICINE

## 2022-04-04 PROCEDURE — 80061 LIPID PANEL: CPT | Performed by: FAMILY MEDICINE

## 2022-04-04 PROCEDURE — 84443 ASSAY THYROID STIM HORMONE: CPT | Performed by: FAMILY MEDICINE

## 2022-04-04 PROCEDURE — 36415 COLL VENOUS BLD VENIPUNCTURE: CPT | Performed by: FAMILY MEDICINE

## 2022-04-04 PROCEDURE — 80053 COMPREHEN METABOLIC PANEL: CPT | Performed by: FAMILY MEDICINE

## 2022-04-04 PROCEDURE — 82570 ASSAY OF URINE CREATININE: CPT | Performed by: FAMILY MEDICINE

## 2022-04-04 PROCEDURE — 99214 OFFICE O/P EST MOD 30 MIN: CPT | Performed by: FAMILY MEDICINE

## 2022-04-04 PROCEDURE — 85025 COMPLETE CBC W/AUTO DIFF WBC: CPT | Performed by: FAMILY MEDICINE

## 2022-04-04 PROCEDURE — 83036 HEMOGLOBIN GLYCOSYLATED A1C: CPT | Performed by: FAMILY MEDICINE

## 2022-04-04 PROCEDURE — 86580 TB INTRADERMAL TEST: CPT | Performed by: FAMILY MEDICINE

## 2022-04-04 PROCEDURE — 82043 UR ALBUMIN QUANTITATIVE: CPT | Performed by: FAMILY MEDICINE

## 2022-04-04 RX ORDER — HYDROCHLOROTHIAZIDE 25 MG/1
25 TABLET ORAL DAILY
Qty: 90 TABLET | Refills: 3 | Status: SHIPPED | OUTPATIENT
Start: 2022-04-04

## 2022-04-04 RX ORDER — INSULIN LISPRO 100 [IU]/ML
10 INJECTION, SOLUTION INTRAVENOUS; SUBCUTANEOUS
Qty: 10 PEN | Refills: 1 | Status: SHIPPED | OUTPATIENT
Start: 2022-04-04 | End: 2022-04-04

## 2022-04-04 RX ORDER — LATANOPROST 50 UG/ML
SOLUTION/ DROPS OPHTHALMIC
COMMUNITY
Start: 2022-01-18

## 2022-04-04 RX ORDER — METFORMIN HYDROCHLORIDE 500 MG/1
500 TABLET, EXTENDED RELEASE ORAL NIGHTLY
Qty: 90 TABLET | Refills: 1 | Status: SHIPPED | OUTPATIENT
Start: 2022-04-04 | End: 2023-01-12 | Stop reason: SDUPTHER

## 2022-04-04 RX ORDER — CLONIDINE HYDROCHLORIDE 0.1 MG/1
0.1 TABLET ORAL 2 TIMES DAILY
Qty: 180 TABLET | Refills: 3 | Status: SHIPPED | OUTPATIENT
Start: 2022-04-04

## 2022-04-04 RX ORDER — AMLODIPINE BESYLATE 10 MG/1
10 TABLET ORAL DAILY
Qty: 90 TABLET | Refills: 3 | Status: SHIPPED | OUTPATIENT
Start: 2022-04-04

## 2022-04-04 RX ORDER — BENAZEPRIL HYDROCHLORIDE 40 MG/1
40 TABLET, FILM COATED ORAL DAILY
Qty: 90 TABLET | Refills: 1 | Status: SHIPPED | OUTPATIENT
Start: 2022-04-04 | End: 2022-10-19

## 2022-04-04 RX ORDER — OMEPRAZOLE 40 MG/1
40 CAPSULE, DELAYED RELEASE ORAL DAILY
Qty: 90 CAPSULE | Refills: 3 | Status: SHIPPED | OUTPATIENT
Start: 2022-04-04

## 2022-04-04 RX ORDER — INSULIN LISPRO 100 [IU]/ML
INJECTION, SOLUTION INTRAVENOUS; SUBCUTANEOUS
Qty: 15 ML | Refills: 6 | Status: SHIPPED | OUTPATIENT
Start: 2022-04-04

## 2022-04-04 NOTE — OUTREACH NOTE
CCM Interim Update    Face to face during office visit. Medication list updated and refill for Ozempic pended to PCP. Blood pressure at office 142/64. HCTZ 25 mg daily added today by Dr Magdaleno. Aphasia worsening. MRI of brain(scheduled 4/22/22 at 815 am at [facility]) and follow up with Dr Betts was ordered. Encouraged to check blood pressure and glucose daily and log. She is active user of [email or web address] and will be able to communicate with me via messages.         Name and Relationship of Patient/Support Person: Charis Hill - Self  Self        Education Documentation  Oral Medication, taught by Chayo Reynolds, RN at 4/4/2022  2:36 PM.  Learner: Patient  Readiness: Acceptance  Method: Explanation  Response: Verbalizes Understanding    Insulin Therapy, taught by Chayo Reynolds, RN at 4/4/2022  2:36 PM.  Learner: Patient  Readiness: Acceptance  Method: Explanation  Response: Verbalizes Understanding    Blood Glucose Monitoring, taught by Chayo Reynolds, RN at 4/4/2022  2:36 PM.  Learner: Patient  Readiness: Acceptance  Method: Explanation  Response: Verbalizes Understanding          CHAYO GARCIA  Ambulatory Case Management    4/4/2022, 14:36 EDT

## 2022-04-04 NOTE — PROGRESS NOTES
"Chief Complaint  Diabetes (6 month follow up)    SUBJECTIVE  Charis Hill presents to [facility]    71-year-old type 2 diabetes hypertension patient has had a significant aphasia difficult to express her her words although she knows what she wants to say for about 5 years saw Dr. Betts previously now is having more trouble for the last 6 months of her handwriting is worse falling no headaches to do an MRI and recheck with Dr. Betts    PAST MEDICAL HISTORY  No Known Allergies     No past surgical history on file.    Social History     Tobacco Use   • Smoking status: Never Smoker   • Smokeless tobacco: Never Used   Substance Use Topics   • Alcohol use: Never       Family History   Problem Relation Age of Onset   • Other Mother         CVA (Cerebrovascular accident)   • Heart disease Mother    • Diabetes Father 63        Health Maintenance Due   Topic Date Due   • Pneumococcal Vaccine 65+ (1 of 1 - PPSV23) Never done   • ANNUAL WELLNESS VISIT  Never done   • URINE MICROALBUMIN  03/15/2022   • HEMOGLOBIN A1C  04/04/2022        Last Completed Colonoscopy          Discontinued - COLORECTAL CANCER SCREENING  Discontinued    No completion history exists for this topic.                REVIEW OF SYSTEMS    Cardiovascular no chest pain no palpitations  Neurologic just difficulty writing no particular weakness in her hand is still able to do her work aphasia is not gotten much better but is not worsened particularly in her gait  Endocrinologic patient thinks her sugars are better takes Lantus and a sliding scale and Ozempic    OBJECTIVE  Vitals:    04/04/22 0912 04/04/22 0934   BP: 160/74 142/64   Pulse: 103    Temp: 97.6 °F (36.4 °C)    SpO2: 99%    Weight: 96.2 kg (212 lb)    Height: 167.6 cm (66\")      Body mass index is 34.22 kg/m².    PHYSICAL EXAM    General no distress  Neurologic aphasia still present patient understands what I am saying no problems with gait no problems was " mentation can write her name but it is not as good as it used to be  Cardiovascular regular rhythm no murmur  Respiratory lungs clear and equal bilaterally    ASSESSMENT & PLAN        Type 2 diabetes needs an A1c hypertension needs better control will start hydrochlorothiazide 12.5 daily #3 is increased problems with writing needs an MRI of the brain and appoint with Dr. Betts need to recheck in 3 months            Patient was given instructions and counseling regarding her condition or for health maintenance advice. Please see specific information pulled into the AVS if appropriate.

## 2022-04-05 ENCOUNTER — PATIENT OUTREACH (OUTPATIENT)
Dept: CASE MANAGEMENT | Facility: OTHER | Age: 72
End: 2022-04-05

## 2022-04-05 DIAGNOSIS — Z79.4 DIABETES MELLITUS, TYPE II, INSULIN DEPENDENT: ICD-10-CM

## 2022-04-05 DIAGNOSIS — E11.9 DIABETES MELLITUS, TYPE II, INSULIN DEPENDENT: ICD-10-CM

## 2022-04-05 DIAGNOSIS — R47.01 APHASIA: Primary | ICD-10-CM

## 2022-04-05 NOTE — OUTREACH NOTE
[facility] Interim Update    MRI of brain scheduled and Ms Hill notified via [employer](4/25/22 at 12 noon). She also requested refill on Humalog but this has already been sent to [facility] home delivery.         Name and Relationship of Patient/Support Person: Benitodany Charis A - Self      Education Documentation  No documentation found.        CHAYO GARCIA  Ambulatory Case Management    4/5/2022, 14:39 EDT

## 2022-04-07 ENCOUNTER — PATIENT OUTREACH (OUTPATIENT)
Dept: CASE MANAGEMENT | Facility: OTHER | Age: 72
End: 2022-04-07

## 2022-04-07 ENCOUNTER — TELEPHONE (OUTPATIENT)
Dept: CASE MANAGEMENT | Facility: OTHER | Age: 72
End: 2022-04-07

## 2022-04-07 DIAGNOSIS — E11.9 DIABETES MELLITUS, TYPE II, INSULIN DEPENDENT: Primary | ICD-10-CM

## 2022-04-07 DIAGNOSIS — Z79.4 DIABETES MELLITUS, TYPE II, INSULIN DEPENDENT: Primary | ICD-10-CM

## 2022-04-07 LAB
INDURATION: 0 MM (ref 0–10)
Lab: NORMAL
Lab: NORMAL
TB SKIN TEST: NORMAL

## 2022-04-07 NOTE — TELEPHONE ENCOUNTER
Ms Hill will only take .25 mg weekly due to some nausea when dosage increased. Dr Magdaleno aware and agrees for her to stay on .25 mg weekly.

## 2022-04-08 ENCOUNTER — TELEPHONE (OUTPATIENT)
Dept: CASE MANAGEMENT | Facility: OTHER | Age: 72
End: 2022-04-08

## 2022-04-08 DIAGNOSIS — E11.9 DIABETES MELLITUS, TYPE II, INSULIN DEPENDENT: Primary | ICD-10-CM

## 2022-04-08 DIAGNOSIS — Z79.4 DIABETES MELLITUS, TYPE II, INSULIN DEPENDENT: Primary | ICD-10-CM

## 2022-04-22 ENCOUNTER — APPOINTMENT (OUTPATIENT)
Dept: MRI IMAGING | Facility: HOSPITAL | Age: 72
End: 2022-04-22

## 2022-04-25 ENCOUNTER — PATIENT OUTREACH (OUTPATIENT)
Dept: CASE MANAGEMENT | Facility: OTHER | Age: 72
End: 2022-04-25

## 2022-04-25 ENCOUNTER — HOSPITAL ENCOUNTER (OUTPATIENT)
Dept: MRI IMAGING | Facility: HOSPITAL | Age: 72
Discharge: HOME | End: 2022-04-25
Admitting: FAMILY MEDICINE

## 2022-04-25 DIAGNOSIS — Z79.4 DIABETES MELLITUS, TYPE II, INSULIN DEPENDENT: Primary | ICD-10-CM

## 2022-04-25 DIAGNOSIS — E11.9 DIABETES MELLITUS, TYPE II, INSULIN DEPENDENT: Primary | ICD-10-CM

## 2022-04-25 DIAGNOSIS — R47.01 APHASIA: ICD-10-CM

## 2022-04-25 DIAGNOSIS — I10 HYPERTENSION, UNSPECIFIED TYPE: ICD-10-CM

## 2022-04-25 DIAGNOSIS — I69.851 HEMIPARESIS OF RIGHT DOMINANT SIDE AS LATE EFFECT OF OTHER CEREBROVASCULAR DISEASE: ICD-10-CM

## 2022-04-25 PROCEDURE — 70551 MRI BRAIN STEM W/O DYE: CPT

## 2022-04-25 NOTE — OUTREACH NOTE
[facility] End of Month Documentation    This Chronic Medical Management Care Plan for Charis Hill, 71 y.o. female, has been monitored and managed; reviewed and a new plan of care implemented for the month of April.  A cumulative time of 39  minutes was spent on this patient record this month, including chart review; face to face with provider; face to face visit with patient, MRI of brain scheduled.    Regarding the patient's problems: has Diabetes mellitus, type II, insulin dependent (HCC); Hypertension; Hyperlipidemia; Advance directive declined by patient; and Speech disturbance on their problem list., the following items were addressed: medications and any changes can be found within the plan section of the note.  A detailed listing of time spent for chronic care management is tracked within each outreach encounter.  Current medications include:  has a current medication list which includes the following prescription(s): amlodipine, aspirin, benazepril, clonidine, doxazosin, humalog kwikpen, hydrochlorothiazide, insulin glargine, pen needles, latanoprost, metformin er, metoprolol succinate xl, omeprazole, and semaglutide(0.25 or 0.5mg/dos). and the patient is reported to be patient is compliant with medication protocol, brought medication bottles in with her. states taking as ordered.,  Medications are reported to be effective, labs drawn for A1C and lipids.  Regarding these diagnoses, referrals were made to the following provider(s):  Encourages to keep all scheduled appointments.  All notes on chart for PCP to review.    The patient was monitored remotely for blood pressure; blood glucose; medications.    The patient's physical needs include:  needs met.     The patient's mental support needs include:  needs met    The patient's cognitive support needs include:  needs met    The patient's psychosocial support needs include:  continued support    The patient's functional needs include: needs met, Aphasia  worsening.MRI head and follow up with Neurology ordered.    The patient's environmental needs include:  no needs    Care Plan overall comments:  No data recorded    Refer to previous outreach notes for more information on the areas listed above.    Monthly Billing Diagnoses  (E11.9,  Z79.4) Diabetes mellitus, type II, insulin dependent (HCC)    (R47.01) Aphasia    (I10) Hypertension, unspecified type    Medications   · Medications have been reconciled    Care Plan progress this month:      Recently Modified Care Plans Updates made since 3/25/2022 12:00 AM     Diabetes Type 2 (Adult)         Problem Priority Last Modified     ELS GLYCEMIC MANAGEMENT (DIABETES, TYPE 2) (ADULT) --  3/23/2022  2:53 PM by Sachi Reynolds RN              Goal Recent Progress Last Modified     Glycemic Management Optimized --  3/23/2022  2:53 PM by Sachi Reynolds RN     Evidence-based guidance:   Anticipate A1C testing (point-of-care) every 3 to 6 months based on goal attainment.   Review mutually-set A1C goal or target range.   Anticipate use of antihyperglycemic with or without insulin and periodic adjustments; consider active involvement of pharmacist.   Provide medical nutrition therapy and development of individualized eating.   Compare self-reported symptoms of hypo or hyperglycemia to blood glucose levels, diet and fluid intake, current medications, psychosocial and physiologic stressors, change in activity and barriers to care adherence.   Promote self-monitoring of blood glucose levels.   Assess and address barriers to management plan, such as food insecurity, age, developmental ability, depression, anxiety, fear of hypoglycemia or weight gain, as well as medication cost, side effects and complicated regimen.   Consider referral to community-based diabetes education program, visiting nurse, community health worker or health .   Encourage regular dental care for treatment of periodontal disease; refer to dental provider when  needed.    Notes:              Task Due Date Last Modified     Alleviate Barriers to Glycemic Management --  4/4/2022  1:52 PM by Sachi Reynolds RN     Care Management Activities:      - A1C testing facilitated  - blood glucose monitoring encouraged  - use of blood glucose monitoring log promoted      Notes:                Problem Priority Last Modified     ELS DISEASE PROGRESSION (DIABETES, TYPE 2) (ADULT) --  3/23/2022  2:53 PM by Sachi Reynolds RN              Goal Recent Progress Last Modified     Disease Progression Prevented or Minimized --  3/23/2022  2:53 PM by Sachi Reynolds RN     Evidence-based guidance:   Prepare patient for laboratory and diagnostic exams based on risk and presentation.   Encourage lifestyle changes, such as increased intake of plant-based foods, stress reduction, consistent physical activity and smoking cessation to prevent long-term complications and chronic disease.    Individualize activity and exercise recommendations while considering potential limitations, such as neuropathy, retinopathy or the ability to prevent hyperglycemia or hypoglycemia.    Prepare patient for use of pharmacologic therapy that may include antihypertensive, analgesic, prostaglandin E1 with periodic adjustments, based on presenting chronic condition and laboratory results.   Assess signs/symptoms and risk factors for hypertension, sleep-disordered breathing, neuropathy (including changes in gait and balance), retinopathy, nephropathy and sexual dysfunction.   Address pregnancy planning and contraceptive choice, especially when prescribing antihypertensive or statin.   Ensure completion of annual comprehensive foot exam and dilated eye exam.    Implement additional individualized goals and interventions based on identified risk factors.   Prepare patient for consultation or referral for specialist care, such as ophthalmology, neurology, cardiology, podiatry, nephrology or perinatology.    Notes:               Task Due Date Last Modified     Monitor and Manage Follow-up for Comorbidities --  4/4/2022  1:53 PM by Chayo Reynolds, RN     Care Management Activities:      - completion of annual dilated eye exam confirmed  - completion of annual foot exam verified      Notes:                      Instructions   · Patient was provided an electronic copy of care plan  · CCM services were explained and offered and patient has accepted these services.  · Patient has given their written consent to receive CCM services and understands that this includes the authorization of electronic communication of medical information with the other treating providers.  · Patient understands that they may stop CCM services at any time and these changes will be effective at the end of the calendar month and will effectively revocate the agreement of CCM services.  · Patient understands that only one practitioner can furnish and be paid for CCM services during one calendar month.  Patient also understands that there may be co-payment or deductible fees in association with CCM services.  · Patient will continue with at least monthly follow-up calls with the Nurse Navigator.    CHAYO GARCIA  Ambulatory Case Management    4/25/2022, 09:37 EDT

## 2022-05-03 ENCOUNTER — PATIENT MESSAGE (OUTPATIENT)
Dept: CASE MANAGEMENT | Facility: OTHER | Age: 72
End: 2022-05-03

## 2022-05-03 ENCOUNTER — PATIENT OUTREACH (OUTPATIENT)
Dept: CASE MANAGEMENT | Facility: OTHER | Age: 72
End: 2022-05-03

## 2022-05-03 DIAGNOSIS — Z79.4 DIABETES MELLITUS, TYPE II, INSULIN DEPENDENT: Primary | ICD-10-CM

## 2022-05-03 DIAGNOSIS — E11.9 DIABETES MELLITUS, TYPE II, INSULIN DEPENDENT: Primary | ICD-10-CM

## 2022-05-03 DIAGNOSIS — I10 HYPERTENSION, UNSPECIFIED TYPE: ICD-10-CM

## 2022-05-03 DIAGNOSIS — R47.01 APHASIA: ICD-10-CM

## 2022-05-03 NOTE — OUTREACH NOTE
[facility] Interim Update    Reminded  via [employer] of upcoming Neurology appointment with Dr Betts 5/12/22 at 1130 am (phone number [phone] at [address]). I also reminded that I have Ozempic samples approved for her to . I will follow up at later time for glucose and blood pressure readings.         Name and Relationship of Patient/Support Person: Charis Hill - Self        Education Documentation  No documentation found.        CHAYO GARCIA  Ambulatory Case Management    5/3/2022, 12:14 EDT

## 2022-05-11 ENCOUNTER — PATIENT OUTREACH (OUTPATIENT)
Dept: CASE MANAGEMENT | Facility: OTHER | Age: 72
End: 2022-05-11

## 2022-05-11 DIAGNOSIS — E11.9 DIABETES MELLITUS, TYPE II, INSULIN DEPENDENT: ICD-10-CM

## 2022-05-11 DIAGNOSIS — R47.01 APHASIA: Primary | ICD-10-CM

## 2022-05-11 DIAGNOSIS — Z79.4 DIABETES MELLITUS, TYPE II, INSULIN DEPENDENT: ICD-10-CM

## 2022-05-11 PROCEDURE — 99490 CHRNC CARE MGMT STAFF 1ST 20: CPT | Performed by: FAMILY MEDICINE

## 2022-05-11 NOTE — TELEPHONE ENCOUNTER
From: Charis Hill  Sent: 5/10/2022 5:51 PM EDT  To: Sachi Reynolds RN  Subject: Neurology appointment    They want to call and confirm my appointment, I can’t talk on the phone, would you do it for me, thank you Charis

## 2022-05-11 NOTE — OUTREACH NOTE
[facility] [place]    Ms Hill contacted me via [employer] asking if I could call Dr Betts's office to confirm she will be at her appointment tomorrow. I called and they are aware.        Name and Relationship of Patient/Support Person: Charis Hill - Self        Education Documentation  No documentation found.        HCAYO GARCIA  Ambulatory Case Management    5/11/2022, 14:07 EDT

## 2022-05-24 ENCOUNTER — PATIENT OUTREACH (OUTPATIENT)
Dept: CASE MANAGEMENT | Facility: OTHER | Age: 72
End: 2022-05-24

## 2022-05-24 DIAGNOSIS — R47.01 APHASIA: ICD-10-CM

## 2022-05-24 DIAGNOSIS — Z79.4 DIABETES MELLITUS, TYPE II, INSULIN DEPENDENT: Primary | ICD-10-CM

## 2022-05-24 DIAGNOSIS — E11.9 DIABETES MELLITUS, TYPE II, INSULIN DEPENDENT: Primary | ICD-10-CM

## 2022-06-15 ENCOUNTER — PATIENT OUTREACH (OUTPATIENT)
Dept: CASE MANAGEMENT | Facility: OTHER | Age: 72
End: 2022-06-15

## 2022-06-15 DIAGNOSIS — Z79.4 DIABETES MELLITUS, TYPE II, INSULIN DEPENDENT: Primary | ICD-10-CM

## 2022-06-15 DIAGNOSIS — I10 HYPERTENSION, UNSPECIFIED TYPE: ICD-10-CM

## 2022-06-15 DIAGNOSIS — R47.01 APHASIA: ICD-10-CM

## 2022-06-15 DIAGNOSIS — E11.9 DIABETES MELLITUS, TYPE II, INSULIN DEPENDENT: Primary | ICD-10-CM

## 2022-06-23 NOTE — OUTREACH NOTE
CCM Interim Update    Left message on voicemail to check Mychart message. She is aphasic and cannot take phone calls.         Name and Relationship of Patient/Support Person: Charis Hill - Self        Education Documentation  No documentation found.        CHAYO GARCIA  Ambulatory Case Management    6/23/2022, 11:38 EDT

## 2022-07-06 ENCOUNTER — PATIENT OUTREACH (OUTPATIENT)
Dept: CASE MANAGEMENT | Facility: OTHER | Age: 72
End: 2022-07-06

## 2022-07-06 ENCOUNTER — OFFICE VISIT (OUTPATIENT)
Dept: FAMILY MEDICINE CLINIC | Facility: CLINIC | Age: 72
End: 2022-07-06

## 2022-07-06 VITALS
HEIGHT: 66 IN | HEART RATE: 105 BPM | BODY MASS INDEX: 34.23 KG/M2 | OXYGEN SATURATION: 98 % | DIASTOLIC BLOOD PRESSURE: 80 MMHG | WEIGHT: 213 LBS | SYSTOLIC BLOOD PRESSURE: 140 MMHG | TEMPERATURE: 98.2 F

## 2022-07-06 DIAGNOSIS — Z79.899 MEDICATION MANAGEMENT: ICD-10-CM

## 2022-07-06 DIAGNOSIS — E11.65 TYPE 2 DIABETES MELLITUS WITH HYPERGLYCEMIA, WITH LONG-TERM CURRENT USE OF INSULIN: Primary | ICD-10-CM

## 2022-07-06 DIAGNOSIS — E11.9 DIABETES MELLITUS, TYPE II, INSULIN DEPENDENT: Primary | ICD-10-CM

## 2022-07-06 DIAGNOSIS — I15.8 OTHER SECONDARY HYPERTENSION: ICD-10-CM

## 2022-07-06 DIAGNOSIS — R47.01 APHASIA: ICD-10-CM

## 2022-07-06 DIAGNOSIS — Z79.4 TYPE 2 DIABETES MELLITUS WITH HYPERGLYCEMIA, WITH LONG-TERM CURRENT USE OF INSULIN: Primary | ICD-10-CM

## 2022-07-06 DIAGNOSIS — Z79.4 DIABETES MELLITUS, TYPE II, INSULIN DEPENDENT: Primary | ICD-10-CM

## 2022-07-06 LAB — HBA1C MFR BLD: 7.8 % (ref 4.8–5.6)

## 2022-07-06 PROCEDURE — 99214 OFFICE O/P EST MOD 30 MIN: CPT | Performed by: FAMILY MEDICINE

## 2022-07-06 PROCEDURE — 36415 COLL VENOUS BLD VENIPUNCTURE: CPT | Performed by: FAMILY MEDICINE

## 2022-07-06 PROCEDURE — 83036 HEMOGLOBIN GLYCOSYLATED A1C: CPT | Performed by: FAMILY MEDICINE

## 2022-07-06 PROCEDURE — 99490 CHRNC CARE MGMT STAFF 1ST 20: CPT | Performed by: FAMILY MEDICINE

## 2022-07-06 NOTE — PROGRESS NOTES
"Chief Complaint  Follow-up    SUBJECTIVE  Charis Hill presents to [facility]    Patient 72 years old  Aphasia patient is doing well with the aphasia by using her smart phone [employer] the computer to communicate with us states she is feeling well she is still working has type 2 diabetes hypertension obesity also    PAST MEDICAL HISTORY  No Known Allergies     No past surgical history on file.    Social History     Tobacco Use   • Smoking status: Never Smoker   • Smokeless tobacco: Never Used   Substance Use Topics   • Alcohol use: Never       Family History   Problem Relation Age of Onset   • Other Mother         CVA (Cerebrovascular accident)   • Heart disease Mother    • Diabetes Father 63        Health Maintenance Due   Topic Date Due   • Pneumococcal Vaccine 65+ (1 - PCV) Never done   • ANNUAL WELLNESS VISIT  Never done        Last Completed Colonoscopy          Discontinued - COLORECTAL CANCER SCREENING  Discontinued    No completion history exists for this topic.                REVIEW OF SYSTEMS    Cardiovascular no chest pain no palpitation  Respiratory no shortness of breath no dyspnea exertion  GI discussed plant-based diet discussed losing weight discussed obesity patient understands    OBJECTIVE  Vitals:    07/06/22 1052   BP: 140/80   Pulse: 105   Temp: 98.2 °F (36.8 °C)   SpO2: 98%   Weight: 96.6 kg (213 lb)   Height: 167.6 cm (66\")     Body mass index is 34.38 kg/m².    PHYSICAL EXAM    General no distress  Neurologic mentation seems normal is aphasia seems the same can communicate well with her smart phone  Cardiovascular regular rhythm no murmur  Respiratory lungs clear and equal bilaterally      ASSESSMENT & PLAN  Diagnoses and all orders for this visit:    1. Diabetes mellitus, type II, insulin dependent (HCC) (Primary)  -     Hemoglobin A1c    2. Medication management  -     Hemoglobin A1c    Other orders  -     Semaglutide,0.25 or 0.5MG/DOS, (OZEMPIC) 2 " MG/1.5ML solution pen-injector; Inject 0.25 mg under the skin into the appropriate area as directed 1 (One) Time Per Week.  Dispense: 3 pen; Refill: 0          Type 2 diabetes needs an A1c obesity lose 15 pounds plant-based diet hypertension reasonable control aphasia is stable            Patient was given instructions and counseling regarding her condition or for health maintenance advice. Please see specific information pulled into the AVS if appropriate.

## 2022-07-06 NOTE — OUTREACH NOTE
[facility] Interim Update    Face to face with Ms Hill during PCP visit. Discussed medication compliance, diet and exercise. Encouraged to monitor blood pressure and glucose levels at home. Labs drawn today and I will contact with results.         Name and Relationship of Patient/Support Person: Charis Hill - Self        Education Documentation  Blood Pressure Monitoring, taught by Chayo Reynolds, RN at 7/6/2022 12:04 PM.  Learner: Patient  Readiness: Acceptance  Method: Explanation, Handout  Response: Verbalizes Understanding    Healthy Food Choices, taught by Chayo Reynolds, GENEVIEVE at 7/6/2022 12:04 PM.  Learner: Patient  Readiness: Acceptance  Method: Explanation, Handout  Response: Verbalizes Understanding    Blood Glucose Monitoring, taught by Chayo Reynolds, GENEVIEVE at 7/6/2022 12:04 PM.  Learner: Patient  Readiness: Acceptance  Method: Explanation, Handout  Response: Verbalizes Understanding    A1C Goal, taught by Chayo Reynolds, RN at 7/6/2022 12:04 PM.  Learner: Patient  Readiness: Acceptance  Method: Explanation, Handout  Response: Verbalizes Understanding          CHAYO GARCIA  Ambulatory Case Management    7/6/2022, 12:07 EDT

## 2022-07-07 RX ORDER — NITROFURANTOIN 25; 75 MG/1; MG/1
100 CAPSULE ORAL
Qty: 10 CAPSULE | Refills: 1 | OUTPATIENT
Start: 2022-07-07

## 2022-07-08 ENCOUNTER — PATIENT OUTREACH (OUTPATIENT)
Dept: CASE MANAGEMENT | Facility: OTHER | Age: 72
End: 2022-07-08

## 2022-07-08 DIAGNOSIS — Z79.4 TYPE 2 DIABETES MELLITUS WITH HYPERGLYCEMIA, WITH LONG-TERM CURRENT USE OF INSULIN: Primary | ICD-10-CM

## 2022-07-08 DIAGNOSIS — I10 HYPERTENSION, UNSPECIFIED TYPE: ICD-10-CM

## 2022-07-08 DIAGNOSIS — E11.65 TYPE 2 DIABETES MELLITUS WITH HYPERGLYCEMIA, WITH LONG-TERM CURRENT USE OF INSULIN: Primary | ICD-10-CM

## 2022-07-08 NOTE — OUTREACH NOTE
[facility] Interim Update    A1C returned at 7.8. I will contact Ms Hill via [employer] to let her know results.        Name and Relationship of Patient/Support Person:  -         Education Documentation  No documentation found.        CHAYO GARCIA  Ambulatory Case Management    7/8/2022, 12:21 EDT

## 2022-07-22 ENCOUNTER — PATIENT OUTREACH (OUTPATIENT)
Dept: CASE MANAGEMENT | Facility: OTHER | Age: 72
End: 2022-07-22

## 2022-07-22 DIAGNOSIS — R47.01 APHASIA: ICD-10-CM

## 2022-07-22 DIAGNOSIS — Z79.4 TYPE 2 DIABETES MELLITUS WITH HYPERGLYCEMIA, WITH LONG-TERM CURRENT USE OF INSULIN: Primary | ICD-10-CM

## 2022-07-22 DIAGNOSIS — I10 HYPERTENSION, UNSPECIFIED TYPE: ICD-10-CM

## 2022-07-22 DIAGNOSIS — E11.65 TYPE 2 DIABETES MELLITUS WITH HYPERGLYCEMIA, WITH LONG-TERM CURRENT USE OF INSULIN: Primary | ICD-10-CM

## 2022-07-22 NOTE — OUTREACH NOTE
[facility] End of Month Documentation    This Chronic Medical Management Care Plan for Charis Hill, 72 y.o. female, has been monitored and managed; reviewed and a new plan of care implemented for the month of July.  A cumulative time of 29  minutes was spent on this patient record this month, including face to face visit with patient; face to face with provider; chart review, medication review and importance of compliance. samples of Ozempic given.    Regarding the patient's problems: has Diabetes mellitus, type II, insulin dependent (HCC); Hypertension; Hyperlipidemia; Advance directive declined by patient; and Speech disturbance on their problem list., the following items were addressed: medications and any changes can be found within the plan section of the note.  A detailed listing of time spent for chronic care management is tracked within each outreach encounter.  Current medications include:  has a current medication list which includes the following prescription(s): amlodipine, aspirin, benazepril, clonidine, doxazosin, humalog kwikpen, hydrochlorothiazide, insulin glargine, pen needles, latanoprost, metformin er, metoprolol succinate xl, omeprazole, and semaglutide(0.25 or 0.5mg/dos). and the patient is reported to be patient is compliant with medication protocol, brought medication bottles in with her. states taking as ordered.,  Medications are reported to be effective, awaiting todays A1C level. last drawn 4/4/22 was 7.9.  A1C 7/6/22 was 7.80.  Regarding these diagnoses, referrals were made to the following provider(s):  Instructed to keep all specialty appointments.  All notes on chart for PCP to review.    The patient was monitored remotely for medications; blood glucose; activity level.    The patient's physical needs include:  needs met.     The patient's mental support needs include:  needs met    The patient's cognitive support needs include:  needs met    The patient's psychosocial support needs  include:  continued support    The patient's functional needs include: needs met, aphasia. communicates with written word and via Mychart to medical team    The patient's environmental needs include:  no needs    Care Plan overall comments:  No data recorded    Refer to previous outreach notes for more information on the areas listed above.    Monthly Billing Diagnoses  (E11.65,  Z79.4) Type 2 diabetes mellitus with hyperglycemia, with long-term current use of insulin (HCC)    (I10) Hypertension, unspecified type    (R47.01) Aphasia    Medications   · Medications have been reconciled    Care Plan progress this month:      Recently Modified Care Plans Updates made since 6/21/2022 12:00 AM     Diabetes Type 2 (Adult)         Problem Priority Last Modified     ELS DISEASE PROGRESSION (DIABETES, TYPE 2) (ADULT) --  3/23/2022  2:53 PM by Sachi Reynolds RN              Goal Recent Progress Last Modified     Disease Progression Prevented or Minimized --  3/23/2022  2:53 PM by Sachi Reynolds RN     Evidence-based guidance:   Prepare patient for laboratory and diagnostic exams based on risk and presentation.   Encourage lifestyle changes, such as increased intake of plant-based foods, stress reduction, consistent physical activity and smoking cessation to prevent long-term complications and chronic disease.    Individualize activity and exercise recommendations while considering potential limitations, such as neuropathy, retinopathy or the ability to prevent hyperglycemia or hypoglycemia.    Prepare patient for use of pharmacologic therapy that may include antihypertensive, analgesic, prostaglandin E1 with periodic adjustments, based on presenting chronic condition and laboratory results.   Assess signs/symptoms and risk factors for hypertension, sleep-disordered breathing, neuropathy (including changes in gait and balance), retinopathy, nephropathy and sexual dysfunction.   Address pregnancy planning and contraceptive  choice, especially when prescribing antihypertensive or statin.   Ensure completion of annual comprehensive foot exam and dilated eye exam.    Implement additional individualized goals and interventions based on identified risk factors.   Prepare patient for consultation or referral for specialist care, such as ophthalmology, neurology, cardiology, podiatry, nephrology or perinatology.    Notes:              Task Due Date Last Modified     Monitor and Manage Follow-up for Comorbidities --  7/6/2022 11:53 AM by Chayo Reynolds RN     Care Management Activities:      - completion of annual dilated eye exam confirmed  - completion of annual foot exam verified  - healthy lifestyle promoted  - reduction of sedentary activity encouraged      Notes:                        Instructions   · Patient was provided an electronic copy of care plan  · CCM services were explained and offered and patient has accepted these services.  · Patient has given their written consent to receive CCM services and understands that this includes the authorization of electronic communication of medical information with the other treating providers.  · Patient understands that they may stop CCM services at any time and these changes will be effective at the end of the calendar month and will effectively revocate the agreement of CCM services.  · Patient understands that only one practitioner can furnish and be paid for CCM services during one calendar month.  Patient also understands that there may be co-payment or deductible fees in association with CCM services.  · Patient will continue with at least monthly follow-up calls with the Nurse Navigator.    CHAYO GARCIA  Ambulatory Case Management    7/22/2022, 11:54 EDT

## 2022-08-09 ENCOUNTER — PATIENT OUTREACH (OUTPATIENT)
Dept: CASE MANAGEMENT | Facility: OTHER | Age: 72
End: 2022-08-09

## 2022-08-09 DIAGNOSIS — R47.01 APHASIA: ICD-10-CM

## 2022-08-09 DIAGNOSIS — Z79.4 TYPE 2 DIABETES MELLITUS WITH HYPERGLYCEMIA, WITH LONG-TERM CURRENT USE OF INSULIN: Primary | ICD-10-CM

## 2022-08-09 DIAGNOSIS — E11.65 TYPE 2 DIABETES MELLITUS WITH HYPERGLYCEMIA, WITH LONG-TERM CURRENT USE OF INSULIN: Primary | ICD-10-CM

## 2022-08-09 DIAGNOSIS — I10 HYPERTENSION, UNSPECIFIED TYPE: ICD-10-CM

## 2022-08-09 NOTE — OUTREACH NOTE
[facility] Interim Update    Contacted via [employer] requesting blood sugar and blood pressure readings. Sent information on Diabetes and Nutrition.         Name and Relationship of Patient/Support Person: Charis Hill - Self        Education Documentation  No documentation found.        CHAYO GARCIA  Ambulatory Case Management    8/9/2022, 11:40 EDT

## 2022-09-01 ENCOUNTER — OFFICE VISIT (OUTPATIENT)
Dept: FAMILY MEDICINE CLINIC | Facility: CLINIC | Age: 72
End: 2022-09-01

## 2022-09-01 ENCOUNTER — PATIENT OUTREACH (OUTPATIENT)
Dept: CASE MANAGEMENT | Facility: OTHER | Age: 72
End: 2022-09-01

## 2022-09-01 VITALS
SYSTOLIC BLOOD PRESSURE: 138 MMHG | TEMPERATURE: 98.1 F | DIASTOLIC BLOOD PRESSURE: 64 MMHG | BODY MASS INDEX: 33.91 KG/M2 | HEIGHT: 66 IN | HEART RATE: 116 BPM | WEIGHT: 211 LBS | OXYGEN SATURATION: 97 %

## 2022-09-01 DIAGNOSIS — R35.0 URINARY FREQUENCY: ICD-10-CM

## 2022-09-01 DIAGNOSIS — N39.0 URINARY TRACT INFECTION WITHOUT HEMATURIA, SITE UNSPECIFIED: Primary | ICD-10-CM

## 2022-09-01 DIAGNOSIS — R30.0 DYSURIA: Primary | ICD-10-CM

## 2022-09-01 LAB
BILIRUB BLD-MCNC: NEGATIVE MG/DL
CLARITY, POC: CLEAR
COLOR UR: ABNORMAL
EXPIRATION DATE: ABNORMAL
GLUCOSE UR STRIP-MCNC: ABNORMAL MG/DL
KETONES UR QL: NEGATIVE
LEUKOCYTE EST, POC: ABNORMAL
Lab: ABNORMAL
NITRITE UR-MCNC: POSITIVE MG/ML
PH UR: 5 [PH] (ref 5–8)
PROT UR STRIP-MCNC: ABNORMAL MG/DL
RBC # UR STRIP: ABNORMAL /UL
SP GR UR: 1.02 (ref 1–1.03)
UROBILINOGEN UR QL: NORMAL

## 2022-09-01 PROCEDURE — 87086 URINE CULTURE/COLONY COUNT: CPT | Performed by: FAMILY MEDICINE

## 2022-09-01 PROCEDURE — 87088 URINE BACTERIA CULTURE: CPT | Performed by: FAMILY MEDICINE

## 2022-09-01 PROCEDURE — 87186 SC STD MICRODIL/AGAR DIL: CPT | Performed by: FAMILY MEDICINE

## 2022-09-01 PROCEDURE — 81003 URINALYSIS AUTO W/O SCOPE: CPT | Performed by: FAMILY MEDICINE

## 2022-09-01 PROCEDURE — 99212 OFFICE O/P EST SF 10 MIN: CPT | Performed by: FAMILY MEDICINE

## 2022-09-01 RX ORDER — NITROFURANTOIN 25; 75 MG/1; MG/1
100 CAPSULE ORAL 2 TIMES DAILY
Qty: 10 CAPSULE | Refills: 0 | Status: SHIPPED | OUTPATIENT
Start: 2022-09-01 | End: 2023-01-12

## 2022-09-01 NOTE — OUTREACH NOTE
[facility] Interim Update    I was contacted by Ms Hill via [employer]. She is having uti symptoms and needed PCP appointment. This was made for her today at 145 pm. She is aware and responded she would be here via [employer].         Name and Relationship of Patient/Support Person:  -         Education Documentation  No documentation found.        CHAYO GARCIA  Ambulatory Case Management    9/1/2022, 10:05 EDT

## 2022-09-01 NOTE — PROGRESS NOTES
"Chief Complaint  Urinary Tract Infection (Started burning with urination last night)    SUBJECTIVE  Charis Hill presents to [facility]    72-year-old dysuria frequency not allergic to any medicine no fever no flank pain    PAST MEDICAL HISTORY  No Known Allergies     No past surgical history on file.    Social History     Tobacco Use   • Smoking status: Never Smoker   • Smokeless tobacco: Never Used   Substance Use Topics   • Alcohol use: Never       Family History   Problem Relation Age of Onset   • Other Mother         CVA (Cerebrovascular accident)   • Heart disease Mother    • Diabetes Father 63        Health Maintenance Due   Topic Date Due   • Pneumococcal Vaccine 65+ (1 - PCV) Never done   • ANNUAL WELLNESS VISIT  Never done        Last Completed Colonoscopy          Discontinued - COLORECTAL CANCER SCREENING  Discontinued    No completion history exists for this topic.                REVIEW OF SYSTEMS     as above dysuria frequency no fever no CVA tenderness    OBJECTIVE  Vitals:    09/01/22 1402   BP: 138/64   Pulse: 116   Temp: 98.1 °F (36.7 °C)   SpO2: 97%   Weight: 95.7 kg (211 lb)   Height: 167.6 cm (66\")     Body mass index is 34.06 kg/m².    PHYSICAL EXAM    General no distress  Cardiovascular regular rhythm no murmur  Respiratory lungs clinical bilaterally  Abdomen soft nontender no pedal splenomegaly no CVA tenderness    ASSESSMENT & PLAN  Diagnoses and all orders for this visit:    1. Dysuria (Primary)  -     POC Urinalysis Dipstick, Automated  -     Urine Culture - Urine, Urine, Clean Catch    2. Urinary frequency  -     POC Urinalysis Dipstick, Automated  -     Urine Culture - Urine, Urine, Clean Catch    Other orders  -     nitrofurantoin, macrocrystal-monohydrate, (Macrobid) 100 MG capsule; Take 1 capsule by mouth 2 (Two) Times a Day.  Dispense: 10 capsule; Refill: 0          UTI probably lower tract start Macrobid culture will be done call back if " fever            Patient was given instructions and counseling regarding her condition or for health maintenance advice. Please see specific information pulled into the AVS if appropriate.

## 2022-09-03 LAB — BACTERIA SPEC AEROBE CULT: ABNORMAL

## 2022-09-13 DIAGNOSIS — Z79.4 TYPE 2 DIABETES MELLITUS WITH HYPERGLYCEMIA, WITH LONG-TERM CURRENT USE OF INSULIN: ICD-10-CM

## 2022-09-13 DIAGNOSIS — E11.65 TYPE 2 DIABETES MELLITUS WITH HYPERGLYCEMIA, WITH LONG-TERM CURRENT USE OF INSULIN: ICD-10-CM

## 2022-09-14 RX ORDER — INSULIN GLARGINE 100 [IU]/ML
INJECTION, SOLUTION SUBCUTANEOUS
Qty: 25 ML | Refills: 3 | Status: SHIPPED | OUTPATIENT
Start: 2022-09-14

## 2022-09-22 ENCOUNTER — PATIENT OUTREACH (OUTPATIENT)
Dept: CASE MANAGEMENT | Facility: OTHER | Age: 72
End: 2022-09-22

## 2022-09-22 DIAGNOSIS — E11.65 TYPE 2 DIABETES MELLITUS WITH HYPERGLYCEMIA, WITH LONG-TERM CURRENT USE OF INSULIN: Primary | ICD-10-CM

## 2022-09-22 DIAGNOSIS — Z79.4 TYPE 2 DIABETES MELLITUS WITH HYPERGLYCEMIA, WITH LONG-TERM CURRENT USE OF INSULIN: Primary | ICD-10-CM

## 2022-09-22 DIAGNOSIS — I10 HYPERTENSION, UNSPECIFIED TYPE: ICD-10-CM

## 2022-09-22 NOTE — OUTREACH NOTE
[facility] Interim Update    Medication list reviewed and it seems she should have refills that are due. I have sent [employer] message asking for her to check medication bottles and get back to me. I have also requested b/p and glucose readings.         Name and Relationship of Patient/Support Person: Charis Hill - Self  Self        Education Documentation  No documentation found.        CHAYO GARICA  Ambulatory Case Management    9/22/2022, 14:57 EDT

## 2022-10-18 DIAGNOSIS — I15.8 OTHER SECONDARY HYPERTENSION: Primary | ICD-10-CM

## 2022-10-19 RX ORDER — BENAZEPRIL HYDROCHLORIDE 40 MG/1
TABLET, FILM COATED ORAL
Qty: 90 TABLET | Refills: 1 | Status: SHIPPED | OUTPATIENT
Start: 2022-10-19 | End: 2023-01-12 | Stop reason: SDUPTHER

## 2023-01-12 ENCOUNTER — OFFICE VISIT (OUTPATIENT)
Dept: FAMILY MEDICINE CLINIC | Facility: CLINIC | Age: 73
End: 2023-01-12
Payer: MEDICARE

## 2023-01-12 VITALS
SYSTOLIC BLOOD PRESSURE: 140 MMHG | OXYGEN SATURATION: 96 % | BODY MASS INDEX: 34.23 KG/M2 | DIASTOLIC BLOOD PRESSURE: 70 MMHG | TEMPERATURE: 97.7 F | WEIGHT: 213 LBS | HEIGHT: 66 IN | HEART RATE: 104 BPM

## 2023-01-12 DIAGNOSIS — Z79.899 MEDICATION MANAGEMENT: ICD-10-CM

## 2023-01-12 DIAGNOSIS — I15.8 OTHER SECONDARY HYPERTENSION: ICD-10-CM

## 2023-01-12 DIAGNOSIS — E11.9 DIABETES MELLITUS, TYPE II, INSULIN DEPENDENT: ICD-10-CM

## 2023-01-12 DIAGNOSIS — E78.2 MIXED HYPERLIPIDEMIA: ICD-10-CM

## 2023-01-12 DIAGNOSIS — Z79.4 DIABETES MELLITUS, TYPE II, INSULIN DEPENDENT: ICD-10-CM

## 2023-01-12 DIAGNOSIS — R47.01 APHASIA: Primary | ICD-10-CM

## 2023-01-12 LAB — HBA1C MFR BLD: 7.5 % (ref 4.8–5.6)

## 2023-01-12 PROCEDURE — 83036 HEMOGLOBIN GLYCOSYLATED A1C: CPT | Performed by: FAMILY MEDICINE

## 2023-01-12 PROCEDURE — 36415 COLL VENOUS BLD VENIPUNCTURE: CPT | Performed by: FAMILY MEDICINE

## 2023-01-12 PROCEDURE — 99213 OFFICE O/P EST LOW 20 MIN: CPT | Performed by: FAMILY MEDICINE

## 2023-01-12 RX ORDER — METFORMIN HYDROCHLORIDE 500 MG/1
500 TABLET, EXTENDED RELEASE ORAL NIGHTLY
Qty: 90 TABLET | Refills: 1 | Status: SHIPPED | OUTPATIENT
Start: 2023-01-12

## 2023-01-12 RX ORDER — BENAZEPRIL HYDROCHLORIDE 40 MG/1
40 TABLET, FILM COATED ORAL DAILY
Qty: 90 TABLET | Refills: 1 | Status: SHIPPED | OUTPATIENT
Start: 2023-01-12

## 2023-01-12 NOTE — PROGRESS NOTES
"Chief Complaint  Diabetes    SUBJECTIVE  Charis Hill presents to [facility]    Patient is a 72-year-old with diabetes does have an aphasia he is able to spell objects for questions and answers on her phone hypertension that she is feeling well still is working full-time    PAST MEDICAL HISTORY  No Known Allergies     No past surgical history on file.    Social History     Tobacco Use   • Smoking status: Never   • Smokeless tobacco: Never   Substance Use Topics   • Alcohol use: Never       Family History   Problem Relation Age of Onset   • Other Mother         CVA (Cerebrovascular accident)   • Heart disease Mother    • Diabetes Father 63        Health Maintenance Due   Topic Date Due   • Pneumococcal Vaccine 65+ (1 - PCV) Never done   • ANNUAL WELLNESS VISIT  Never done   • DIABETIC FOOT EXAM  10/04/2022   • HEMOGLOBIN A1C  01/06/2023        Last Completed Colonoscopy          Discontinued - COLORECTAL CANCER SCREENING  Discontinued    No completion history exists for this topic.                REVIEW OF SYSTEMS    Cardiovascular no chest pain no palpitation  Respiratory no shortness of breath no dyspnea exertion  Neurologic no other focal losses only the aphasia which is better but she spells out everything on her phone    OBJECTIVE  Vitals:    01/12/23 1101   BP: 140/70   Pulse: 104   Temp: 97.7 °F (36.5 °C)   SpO2: 96%   Weight: 96.6 kg (213 lb)   Height: 167.6 cm (66\")     Body mass index is 34.38 kg/m².    PHYSICAL EXAM    General no distress  Lungs clear and equal bilaterally  Cardiovascular regular rhythm no murmur  Neurologic aphasia stable answers are correct gait is normal    ASSESSMENT & PLAN  Diagnoses and all orders for this visit:    1. Aphasia (Primary)    2. Other secondary hypertension    3. Mixed hyperlipidemia    4. Diabetes mellitus, type II, insulin dependent (HCC)  -     Hemoglobin A1c    5. Medication management  -     Hemoglobin A1c          Type 2 " diabetes needs A1c hypertension reasonable control watch her salt overweight to lose weight aphasia stable check in 6 months            Patient was given instructions and counseling regarding her condition or for health maintenance advice. Please see specific information pulled into the AVS if appropriate.

## 2023-01-25 ENCOUNTER — TELEPHONE (OUTPATIENT)
Dept: CASE MANAGEMENT | Facility: OTHER | Age: 73
End: 2023-01-25
Payer: MEDICARE

## 2023-02-20 ENCOUNTER — TELEPHONE (OUTPATIENT)
Dept: CASE MANAGEMENT | Facility: OTHER | Age: 73
End: 2023-02-20
Payer: MEDICARE

## 2023-02-20 NOTE — TELEPHONE ENCOUNTER
[employer] message sent requesting b/p and glucose logs. Instructed to contact me via [employer] for any needs.

## 2023-02-21 ENCOUNTER — TELEPHONE (OUTPATIENT)
Dept: CASE MANAGEMENT | Facility: OTHER | Age: 73
End: 2023-02-21
Payer: MEDICARE

## 2023-02-21 NOTE — TELEPHONE ENCOUNTER
[employer] message sent by Charis to inform me she was not doing b/p and glucose logs. Returned message asking her to try and do daily. Also to please send a few days of readings if possible.

## 2023-02-27 ENCOUNTER — PATIENT OUTREACH (OUTPATIENT)
Dept: CASE MANAGEMENT | Facility: OTHER | Age: 73
End: 2023-02-27
Payer: MEDICARE

## 2023-02-27 DIAGNOSIS — E11.65 TYPE 2 DIABETES MELLITUS WITH HYPERGLYCEMIA, WITH LONG-TERM CURRENT USE OF INSULIN: Primary | ICD-10-CM

## 2023-02-27 DIAGNOSIS — Z79.4 TYPE 2 DIABETES MELLITUS WITH HYPERGLYCEMIA, WITH LONG-TERM CURRENT USE OF INSULIN: Primary | ICD-10-CM

## 2023-02-27 DIAGNOSIS — I10 HYPERTENSION, UNSPECIFIED TYPE: ICD-10-CM

## 2023-02-27 DIAGNOSIS — N39.0 URINARY TRACT INFECTION WITHOUT HEMATURIA, SITE UNSPECIFIED: ICD-10-CM

## 2023-02-28 ENCOUNTER — OFFICE VISIT (OUTPATIENT)
Dept: FAMILY MEDICINE CLINIC | Facility: CLINIC | Age: 73
End: 2023-02-28
Payer: MEDICARE

## 2023-02-28 VITALS
HEIGHT: 66 IN | DIASTOLIC BLOOD PRESSURE: 72 MMHG | OXYGEN SATURATION: 96 % | HEART RATE: 105 BPM | WEIGHT: 216 LBS | SYSTOLIC BLOOD PRESSURE: 132 MMHG | BODY MASS INDEX: 34.72 KG/M2 | TEMPERATURE: 97.5 F

## 2023-02-28 DIAGNOSIS — R30.0 BURNING WITH URINATION: Primary | ICD-10-CM

## 2023-02-28 LAB
BILIRUB BLD-MCNC: NEGATIVE MG/DL
CLARITY, POC: ABNORMAL
COLOR UR: ABNORMAL
EXPIRATION DATE: ABNORMAL
GLUCOSE UR STRIP-MCNC: NEGATIVE MG/DL
KETONES UR QL: ABNORMAL
LEUKOCYTE EST, POC: ABNORMAL
Lab: ABNORMAL
NITRITE UR-MCNC: POSITIVE MG/ML
PH UR: 5 [PH] (ref 5–8)
PROT UR STRIP-MCNC: ABNORMAL MG/DL
RBC # UR STRIP: ABNORMAL /UL
SP GR UR: 1.02 (ref 1–1.03)
UROBILINOGEN UR QL: NORMAL

## 2023-02-28 PROCEDURE — 87186 SC STD MICRODIL/AGAR DIL: CPT | Performed by: FAMILY MEDICINE

## 2023-02-28 PROCEDURE — 81003 URINALYSIS AUTO W/O SCOPE: CPT | Performed by: FAMILY MEDICINE

## 2023-02-28 PROCEDURE — 87086 URINE CULTURE/COLONY COUNT: CPT | Performed by: FAMILY MEDICINE

## 2023-02-28 PROCEDURE — 99213 OFFICE O/P EST LOW 20 MIN: CPT | Performed by: FAMILY MEDICINE

## 2023-02-28 PROCEDURE — 87077 CULTURE AEROBIC IDENTIFY: CPT | Performed by: FAMILY MEDICINE

## 2023-02-28 RX ORDER — NITROFURANTOIN 25; 75 MG/1; MG/1
100 CAPSULE ORAL 2 TIMES DAILY
Qty: 10 CAPSULE | Refills: 1 | Status: SHIPPED | OUTPATIENT
Start: 2023-02-28

## 2023-02-28 NOTE — OUTREACH NOTE
[facility] Interim Update    Charis contacted me via [employer] reporting UTI symptoms. I have made her PCP appt for 2/28/23 at 1145 am.    [facility] End of Month Documentation    This Chronic Medical Management Care Plan for Charis Hill, 72 y.o. female, has been monitored and managed; reviewed and a new plan of care implemented for the month of February.  A cumulative time of 20  minutes was spent on this patient record this month, including chart review, My chart communication with patient.    Regarding the patient's problems: has Diabetes mellitus, type II, insulin dependent (HCC); Hypertension; Hyperlipidemia; Advance directive declined by patient; and Speech disturbance on their problem list., the following items were addressed: uti and any changes can be found within the plan section of the note.  A detailed listing of time spent for chronic care management is tracked within each outreach encounter.  Current medications include:  has a current medication list which includes the following prescription(s): amlodipine, aspirin, benazepril, clonidine, humalog kwikpen, hydrochlorothiazide, pen needles, lantus solostar, latanoprost, metformin er, omeprazole, and semaglutide(0.25 or 0.5mg/dos). and the patient is reported to be No data recorded,  Medications are reported to be not checking glucose or b/p.  Regarding these diagnoses, referrals were made to the following provider(s):  Encouraged to keep all scheduled appointments.  All notes on chart for PCP to review.    The patient was monitored remotely for No data recorded.    The patient's physical needs include:  needs met.     The patient's mental support needs include:  needs met    The patient's cognitive support needs include:  needs met    The patient's psychosocial support needs include:  continued support    The patient's functional needs include: needs met, aphasia. communicates with written word and via [employer] to medical team    The patient's environmental needs  include:  no needs    Care Plan overall comments:  No data recorded    Refer to previous outreach notes for more information on the areas listed above.    Monthly Billing Diagnoses  (E11.65,  Z79.4) Type 2 diabetes mellitus with hyperglycemia, with long-term current use of insulin (HCC)    (I10) Hypertension, unspecified type    (N39.0) Urinary tract infection without hematuria, site unspecified    Medications   · Medications have been reconciled    Care Plan progress this month:      Recently Modified Care Plans Updates made since 1/28/2023 12:00 AM    No recently modified care plans.          ·     Instructions   · Patient was provided an electronic copy of care plan  · CCM services were explained and offered and patient has accepted these services.  · Patient has given their written consent to receive CCM services and understands that this includes the authorization of electronic communication of medical information with the other treating providers.  · Patient understands that they may stop CCM services at any time and these changes will be effective at the end of the calendar month and will effectively revocate the agreement of CCM services.  · Patient understands that only one practitioner can furnish and be paid for CCM services during one calendar month.  Patient also understands that there may be co-payment or deductible fees in association with CCM services.  · Patient will continue with at least monthly follow-up calls with the Ambulatory .    Sachi GARCIA  Ambulatory Case Management    2/28/2023, 08:01 EST

## 2023-02-28 NOTE — PROGRESS NOTES
"Chief Complaint  Difficulty Urinating (Burning, frequency. )    SUBJECTIVE  Charis Hill presents to [facility]    Patient is a 72-year-old diabetes having some dysuria no fever and is nitrite positive leukocyte    PAST MEDICAL HISTORY  No Known Allergies     No past surgical history on file.    Social History     Tobacco Use   • Smoking status: Never   • Smokeless tobacco: Never   Substance Use Topics   • Alcohol use: Never       Family History   Problem Relation Age of Onset   • Other Mother         CVA (Cerebrovascular accident)   • Heart disease Mother    • Diabetes Father 63        Health Maintenance Due   Topic Date Due   • Pneumococcal Vaccine 65+ (1 - PCV) Never done   • ANNUAL WELLNESS VISIT  Never done   • DIABETIC FOOT EXAM  10/04/2022        Last Completed Colonoscopy          Discontinued - COLORECTAL CANCER SCREENING  Discontinued    No completion history exists for this topic.                REVIEW OF SYSTEMS     no fever does not feel particularly bad just dysuria    OBJECTIVE  Vitals:    02/28/23 1139   BP: 132/72   BP Location: Right arm   Patient Position: Sitting   Pulse: 105   Temp: 97.5 °F (36.4 °C)   TempSrc: Temporal   SpO2: 96%   Weight: 98 kg (216 lb)   Height: 167.6 cm (66\")     Body mass index is 34.86 kg/m².    PHYSICAL EXAM    General no distress  Cardiovascular regular rhythm no murmur  Lungs clear and equal bilaterally  Abdomen soft nontender no pedal splenomegaly no CVA tenderness some slight suprapubic tenderness    ASSESSMENT & PLAN  Diagnoses and all orders for this visit:    1. Burning with urination (Primary)  -     POCT urinalysis dipstick, automated  -     Urine Culture - Urine, Urine, Random Void    Other orders  -     nitrofurantoin, macrocrystal-monohydrate, (Macrobid) 100 MG capsule; Take 1 capsule by mouth 2 (Two) Times a Day.  Dispense: 10 capsule; Refill: 1          UTI need urine culture start Macrobid 100 twice daily      "       Patient was given instructions and counseling regarding her condition or for health maintenance advice. Please see specific information pulled into the AVS if appropriate.

## 2023-03-02 LAB — BACTERIA SPEC AEROBE CULT: ABNORMAL

## 2023-03-20 ENCOUNTER — TELEPHONE (OUTPATIENT)
Dept: CASE MANAGEMENT | Facility: OTHER | Age: 73
End: 2023-03-20
Payer: MEDICARE

## 2023-04-27 ENCOUNTER — OFFICE VISIT (OUTPATIENT)
Dept: FAMILY MEDICINE CLINIC | Facility: CLINIC | Age: 73
End: 2023-04-27
Payer: MEDICARE

## 2023-04-27 ENCOUNTER — PATIENT OUTREACH (OUTPATIENT)
Dept: CASE MANAGEMENT | Facility: OTHER | Age: 73
End: 2023-04-27
Payer: MEDICARE

## 2023-04-27 VITALS
SYSTOLIC BLOOD PRESSURE: 164 MMHG | DIASTOLIC BLOOD PRESSURE: 80 MMHG | BODY MASS INDEX: 34.07 KG/M2 | TEMPERATURE: 97.9 F | HEART RATE: 110 BPM | OXYGEN SATURATION: 97 % | HEIGHT: 66 IN | WEIGHT: 212 LBS

## 2023-04-27 DIAGNOSIS — R30.0 BURNING WITH URINATION: Primary | ICD-10-CM

## 2023-04-27 DIAGNOSIS — I10 HYPERTENSION, UNSPECIFIED TYPE: ICD-10-CM

## 2023-04-27 DIAGNOSIS — Z79.4 TYPE 2 DIABETES MELLITUS WITH HYPERGLYCEMIA, WITH LONG-TERM CURRENT USE OF INSULIN: Primary | ICD-10-CM

## 2023-04-27 DIAGNOSIS — E11.65 TYPE 2 DIABETES MELLITUS WITH HYPERGLYCEMIA, WITH LONG-TERM CURRENT USE OF INSULIN: Primary | ICD-10-CM

## 2023-04-27 DIAGNOSIS — N39.0 URINARY TRACT INFECTION WITHOUT HEMATURIA, SITE UNSPECIFIED: ICD-10-CM

## 2023-04-27 LAB
BILIRUB BLD-MCNC: NEGATIVE MG/DL
CLARITY, POC: ABNORMAL
COLOR UR: ABNORMAL
EXPIRATION DATE: ABNORMAL
GLUCOSE UR STRIP-MCNC: ABNORMAL MG/DL
KETONES UR QL: NEGATIVE
LEUKOCYTE EST, POC: ABNORMAL
Lab: ABNORMAL
NITRITE UR-MCNC: POSITIVE MG/ML
PH UR: 5.5 [PH] (ref 5–8)
PROT UR STRIP-MCNC: NEGATIVE MG/DL
RBC # UR STRIP: ABNORMAL /UL
SP GR UR: 1.01 (ref 1–1.03)
UROBILINOGEN UR QL: ABNORMAL

## 2023-04-27 PROCEDURE — 87086 URINE CULTURE/COLONY COUNT: CPT | Performed by: FAMILY MEDICINE

## 2023-04-27 PROCEDURE — 99490 CHRNC CARE MGMT STAFF 1ST 20: CPT | Performed by: FAMILY MEDICINE

## 2023-04-27 RX ORDER — ESTRADIOL 0.1 MG/G
1 CREAM VAGINAL 3 TIMES WEEKLY
Qty: 42.5 G | Refills: 12 | Status: SHIPPED | OUTPATIENT
Start: 2023-04-28

## 2023-04-27 RX ORDER — NITROFURANTOIN 25; 75 MG/1; MG/1
100 CAPSULE ORAL 2 TIMES DAILY
Qty: 10 CAPSULE | Refills: 0 | Status: SHIPPED | OUTPATIENT
Start: 2023-04-27

## 2023-04-27 NOTE — PROGRESS NOTES
"Chief Complaint  Urinary Tract Infection (Burning with urination started yesterday.  Here today with her son, Kwan.  )    SUBJECTIVE  Charis Hill presents to [facility]    Patient is a 72-year-old hypertension diabetes on insulin and some dysuria frequency no fever had a UTI few months ago we will start estriol vaginal cream 2 weeks fingertip amount and 3 days a week we will culture the urine and start on Macrobid    PAST MEDICAL HISTORY  No Known Allergies     No past surgical history on file.    Social History     Tobacco Use   • Smoking status: Never   • Smokeless tobacco: Never   Substance Use Topics   • Alcohol use: Never       Family History   Problem Relation Age of Onset   • Other Mother         CVA (Cerebrovascular accident)   • Heart disease Mother    • Diabetes Father 63        Health Maintenance Due   Topic Date Due   • Pneumococcal Vaccine 65+ (1 - PCV) Never done   • ANNUAL WELLNESS VISIT  Never done   • DIABETIC FOOT EXAM  10/04/2022   • LIPID PANEL  04/04/2023   • URINE MICROALBUMIN  04/04/2023        Last Completed Colonoscopy          Discontinued - COLORECTAL CANCER SCREENING  Discontinued    No completion history exists for this topic.                REVIEW OF SYSTEMS     no flank pain just dysuria some frequency no fever      OBJECTIVE  Vitals:    04/27/23 1359   BP: 164/80   Pulse: 110   Temp: 97.9 °F (36.6 °C)   SpO2: 97%   Weight: 96.2 kg (212 lb)   Height: 167.6 cm (66\")     Body mass index is 34.22 kg/m².    PHYSICAL EXAM    General no distress  Cardiovascular regular rhythm no murmur  Respiratory lungs clear and equal bilaterally  Abdomen soft nontender no hepatosplenomegaly no CVA tenderness no suprapubic tenderness  Urinalysis is both white and red cells  ASSESSMENT & PLAN  Diagnoses and all orders for this visit:    1. Burning with urination (Primary)  -     POC Urinalysis Dipstick, Automated  -     Urine Culture - Urine, Urine, Clean " Catch    Other orders  -     nitrofurantoin, macrocrystal-monohydrate, (Macrobid) 100 MG capsule; Take 1 capsule by mouth 2 (Two) Times a Day.  Dispense: 10 capsule; Refill: 0  -     estradiol (ESTRACE VAGINAL) 0.1 MG/GM vaginal cream; Insert 1 g into the vagina 3 (Three) Times a Week. Use fingertip amount nightly for 2 weeks then 3 times weekly thereafter.  Dispense: 42.5 g; Refill: 12          Dysuria start urine culture Macrobid 100 twice daily Estrace vaginal cream daily for 2 weeks then 3 days a week            Patient was given instructions and counseling regarding her condition or for health maintenance advice. Please see specific information pulled into the AVS if appropriate.

## 2023-04-27 NOTE — OUTREACH NOTE
[facility] Interim Update    4/20/23 sent [employer] message requesting blood sugar readings and medication check for refills. Chart review was done prior to this call for total of 8 minutes CCM time on this date.     4/27/23 Ms Hill responded via codesyt requesting appointment for UTI symptoms. I coordinated with front office staff and returned message to Ms Hill with appointment time of 145 pm today. Total of 12 minutes CCM time on this date.     [facility] End of Month Documentation    This Chronic Medical Management Care Plan for Charis Hill, 72 y.o. female, has been monitored and managed; reviewed and a new plan of care implemented for the month of April.  A cumulative time of 20  minutes was spent on this patient record this month, including chart review; electronic communication with primary care provider, My chart communication with patient, face to face with scheduling.    Regarding the patient's problems: has Diabetes mellitus, type II, insulin dependent (HCC); Hypertension; Hyperlipidemia; Advance directive declined by patient; and Speech disturbance on their problem list., the following items were addressed: medications, uti and any changes can be found within the plan section of the note.  A detailed listing of time spent for chronic care management is tracked within each outreach encounter.  Current medications include:  has a current medication list which includes the following prescription(s): amlodipine, aspirin, benazepril, clonidine, [START ON 4/28/2023] estradiol, humalog kwikpen, hydrochlorothiazide, pen needles, lantus solostar, latanoprost, metformin er, nitrofurantoin (macrocrystal-monohydrate), omeprazole, and semaglutide(0.25 or 0.5mg/dos). and the patient is reported to be patient is compliant with medication protocol,  Medications are reported to be not checking glucose or b/p on regular basis.  Regarding these diagnoses, referrals were made to the following provider(s):  .  All notes on chart  for PCP to review.    The patient was monitored remotely for medications.    The patient's physical needs include:  needs met.     The patient's mental support needs include:  needs met    The patient's cognitive support needs include:  needs met    The patient's psychosocial support needs include:  continued support    The patient's functional needs include: needs met, aphasia. communicates with written word and via Mychart to medical team    The patient's environmental needs include:  no needs    Care Plan overall comments:  No data recorded    Refer to previous outreach notes for more information on the areas listed above.    Monthly Billing Diagnoses  (E11.65,  Z79.4) Type 2 diabetes mellitus with hyperglycemia, with long-term current use of insulin    (I10) Hypertension, unspecified type    (N39.0) Urinary tract infection without hematuria, site unspecified    Medications   · Medications have been reconciled    Care Plan progress this month:      Recently Modified Care Plans Updates made since 3/27/2023 12:00 AM    No recently modified care plans.            Instructions   · Patient was provided an electronic copy of care plan  · CCM services were explained and offered and patient has accepted these services.  · Patient has given their written consent to receive CCM services and understands that this includes the authorization of electronic communication of medical information with the other treating providers.  · Patient understands that they may stop CCM services at any time and these changes will be effective at the end of the calendar month and will effectively revocate the agreement of CCM services.  · Patient understands that only one practitioner can furnish and be paid for CCM services during one calendar month.  Patient also understands that there may be co-payment or deductible fees in association with CCM services.  · Patient will continue with at least monthly follow-up calls with the Ambulatory  .    Sachi GARCIA  Ambulatory Case Management    4/27/2023, 14:49 EDT

## 2023-04-28 LAB — BACTERIA SPEC AEROBE CULT: NORMAL

## 2023-05-09 DIAGNOSIS — E11.65 TYPE 2 DIABETES MELLITUS WITH HYPERGLYCEMIA, WITH LONG-TERM CURRENT USE OF INSULIN: ICD-10-CM

## 2023-05-09 DIAGNOSIS — I15.8 OTHER SECONDARY HYPERTENSION: ICD-10-CM

## 2023-05-09 DIAGNOSIS — K21.9 GASTROESOPHAGEAL REFLUX DISEASE, UNSPECIFIED WHETHER ESOPHAGITIS PRESENT: Primary | ICD-10-CM

## 2023-05-09 DIAGNOSIS — I10 HYPERTENSION, UNSPECIFIED TYPE: ICD-10-CM

## 2023-05-09 DIAGNOSIS — Z79.4 DIABETES MELLITUS, TYPE II, INSULIN DEPENDENT: ICD-10-CM

## 2023-05-09 DIAGNOSIS — E11.9 DIABETES MELLITUS, TYPE II, INSULIN DEPENDENT: ICD-10-CM

## 2023-05-09 DIAGNOSIS — Z79.4 TYPE 2 DIABETES MELLITUS WITH HYPERGLYCEMIA, WITH LONG-TERM CURRENT USE OF INSULIN: ICD-10-CM

## 2023-05-09 RX ORDER — OMEPRAZOLE 40 MG/1
40 CAPSULE, DELAYED RELEASE ORAL DAILY
Qty: 90 CAPSULE | Refills: 3 | Status: SHIPPED | OUTPATIENT
Start: 2023-05-09

## 2023-05-09 RX ORDER — INSULIN GLARGINE 100 [IU]/ML
28 INJECTION, SOLUTION SUBCUTANEOUS NIGHTLY
Qty: 15 ML | Refills: 3 | Status: SHIPPED | OUTPATIENT
Start: 2023-05-09

## 2023-05-09 RX ORDER — AMLODIPINE BESYLATE 10 MG/1
10 TABLET ORAL DAILY
Qty: 90 TABLET | Refills: 3 | Status: SHIPPED | OUTPATIENT
Start: 2023-05-09

## 2023-05-09 RX ORDER — BENAZEPRIL HYDROCHLORIDE 40 MG/1
40 TABLET, FILM COATED ORAL DAILY
Qty: 90 TABLET | Refills: 1 | Status: SHIPPED | OUTPATIENT
Start: 2023-05-09

## 2023-05-09 RX ORDER — CLONIDINE HYDROCHLORIDE 0.1 MG/1
0.1 TABLET ORAL 2 TIMES DAILY
Qty: 180 TABLET | Refills: 3 | Status: SHIPPED | OUTPATIENT
Start: 2023-05-09

## 2023-05-09 RX ORDER — INSULIN LISPRO 100 [IU]/ML
10 INJECTION, SOLUTION INTRAVENOUS; SUBCUTANEOUS
Qty: 15 ML | Refills: 6 | Status: SHIPPED | OUTPATIENT
Start: 2023-05-09 | End: 2023-05-11

## 2023-05-09 RX ORDER — HYDROCHLOROTHIAZIDE 25 MG/1
25 TABLET ORAL DAILY
Qty: 90 TABLET | Refills: 3 | Status: SHIPPED | OUTPATIENT
Start: 2023-05-09

## 2023-05-09 RX ORDER — PEN NEEDLE, DIABETIC 30 GX3/16"
1 NEEDLE, DISPOSABLE MISCELLANEOUS
Qty: 200 EACH | Refills: 3 | Status: SHIPPED | OUTPATIENT
Start: 2023-05-09

## 2023-05-09 RX ORDER — INSULIN LISPRO 100 [IU]/ML
INJECTION, SOLUTION INTRAVENOUS; SUBCUTANEOUS
Qty: 15 ML | Refills: 6 | Status: CANCELLED | OUTPATIENT
Start: 2023-05-09

## 2023-05-09 RX ORDER — METFORMIN HYDROCHLORIDE 500 MG/1
500 TABLET, EXTENDED RELEASE ORAL NIGHTLY
Qty: 90 TABLET | Refills: 1 | Status: SHIPPED | OUTPATIENT
Start: 2023-05-09

## 2023-05-09 NOTE — TELEPHONE ENCOUNTER
Charis sent message via [employer] requesting refills on all medications. I have reordered and pend for your signature.

## 2023-05-11 DIAGNOSIS — Z79.4 DIABETES MELLITUS, TYPE II, INSULIN DEPENDENT: ICD-10-CM

## 2023-05-11 DIAGNOSIS — E11.9 DIABETES MELLITUS, TYPE II, INSULIN DEPENDENT: ICD-10-CM

## 2023-05-11 RX ORDER — INSULIN LISPRO 100 [IU]/ML
INJECTION, SOLUTION INTRAVENOUS; SUBCUTANEOUS
Qty: 15 ML | Refills: 6 | Status: SHIPPED | OUTPATIENT
Start: 2023-05-11

## 2023-05-30 ENCOUNTER — PATIENT OUTREACH (OUTPATIENT)
Dept: CASE MANAGEMENT | Facility: OTHER | Age: 73
End: 2023-05-30

## 2023-05-30 DIAGNOSIS — I10 HYPERTENSION, UNSPECIFIED TYPE: ICD-10-CM

## 2023-05-30 DIAGNOSIS — E11.9 DIABETES MELLITUS, TYPE II, INSULIN DEPENDENT: Primary | ICD-10-CM

## 2023-05-30 DIAGNOSIS — Z79.4 DIABETES MELLITUS, TYPE II, INSULIN DEPENDENT: Primary | ICD-10-CM

## 2023-05-30 NOTE — OUTREACH NOTE
[facility] End of Month Documentation    This Chronic Medical Management Care Plan for Charis Hill, 72 y.o. female, has been monitored and managed; reviewed and a new plan of care implemented for the month of May.  A cumulative time of 20  minutes was spent on this patient record this month, including electronic communication with patient; chart review, my chart communication with patient, medication refills.    Regarding the patient's problems: has Diabetes mellitus, type II, insulin dependent (HCC); Hypertension; Hyperlipidemia; Advance directive declined by patient; and Speech disturbance on their problem list., the following items were addressed: medications and any changes can be found within the plan section of the note.  A detailed listing of time spent for chronic care management is tracked within each outreach encounter.  Current medications include:  has a current medication list which includes the following prescription(s): amlodipine, aspirin, benazepril, clonidine, estradiol, hydrochlorothiazide, lantus solostar, insulin lispro (1 unit dial), pen needles, latanoprost, metformin er, omeprazole, and semaglutide(0.25 or 0.5mg/dos). and the patient is reported to be patient is compliant with medication protocol,  Medications are reported to be effective.  Regarding these diagnoses, referrals were made to the following provider(s):  Encouraged to keep all appointments.  All notes on chart for PCP to review.    The patient was monitored remotely for medications.    The patient's physical needs include:  needs met.     The patient's mental support needs include:  needs met    The patient's cognitive support needs include:  needs met    The patient's psychosocial support needs include:  continued support    The patient's functional needs include: needs met, aphasia. communicates with written word and via Mychart to medical team    The patient's environmental needs include:  no needs    Care Plan overall comments:   No data recorded    Refer to previous outreach notes for more information on the areas listed above.    Monthly Billing Diagnoses  (E11.9,  Z79.4) Diabetes mellitus, type II, insulin dependent (HCC)    (I10) Hypertension, unspecified type    Medications   · Medications have been reconciled    Care Plan progress this month:      Recently Modified Care Plans Updates made since 4/29/2023 12:00 AM    No recently modified care plans.          Instructions   · Patient was provided an electronic copy of care plan  · CCM services were explained and offered and patient has accepted these services.  · Patient has given their written consent to receive CCM services and understands that this includes the authorization of electronic communication of medical information with the other treating providers.  · Patient understands that they may stop CCM services at any time and these changes will be effective at the end of the calendar month and will effectively revocate the agreement of CCM services.  · Patient understands that only one practitioner can furnish and be paid for CCM services during one calendar month.  Patient also understands that there may be co-payment or deductible fees in association with CCM services.  · Patient will continue with at least monthly follow-up calls with the Ambulatory .    Sachi GARCIA  Ambulatory Case Management    5/30/2023, 14:58 EDT

## 2023-06-05 ENCOUNTER — TELEPHONE (OUTPATIENT)
Dept: CASE MANAGEMENT | Facility: OTHER | Age: 73
End: 2023-06-05
Payer: MEDICARE

## 2023-07-26 ENCOUNTER — PATIENT OUTREACH (OUTPATIENT)
Dept: CASE MANAGEMENT | Facility: OTHER | Age: 73
End: 2023-07-26
Payer: MEDICARE

## 2023-07-26 ENCOUNTER — TELEPHONE (OUTPATIENT)
Dept: CASE MANAGEMENT | Facility: OTHER | Age: 73
End: 2023-07-26
Payer: MEDICARE

## 2023-07-26 DIAGNOSIS — Z79.4 DIABETES MELLITUS, TYPE II, INSULIN DEPENDENT: Primary | ICD-10-CM

## 2023-07-26 DIAGNOSIS — I10 HYPERTENSION, UNSPECIFIED TYPE: ICD-10-CM

## 2023-07-26 DIAGNOSIS — E11.9 DIABETES MELLITUS, TYPE II, INSULIN DEPENDENT: Primary | ICD-10-CM

## 2023-07-27 ENCOUNTER — PATIENT OUTREACH (OUTPATIENT)
Dept: CASE MANAGEMENT | Facility: OTHER | Age: 73
End: 2023-07-27
Payer: MEDICARE

## 2023-07-27 DIAGNOSIS — Z79.4 DIABETES MELLITUS, TYPE II, INSULIN DEPENDENT: Primary | ICD-10-CM

## 2023-07-27 DIAGNOSIS — I10 HYPERTENSION, UNSPECIFIED TYPE: ICD-10-CM

## 2023-07-27 DIAGNOSIS — E11.9 DIABETES MELLITUS, TYPE II, INSULIN DEPENDENT: Primary | ICD-10-CM

## 2023-07-27 NOTE — OUTREACH NOTE
[facility] End of Month Documentation    This Chronic Medical Management Care Plan for Charis Hill, 73 y.o. female, has been monitored and managed; reviewed and a new plan of care implemented for the month of July.  A cumulative time of 31  minutes was spent on this patient record this month, including chart review; face to face with provider; electronic communication with patient, glucose and b/p log, pending lab orders.    Regarding the patient's problems: has Diabetes mellitus, type II, insulin dependent; Hypertension; Hyperlipidemia; Advance directive declined by patient; and Speech disturbance on their problem list., the following items were addressed: medications and any changes can be found within the plan section of the note.  A detailed listing of time spent for chronic care management is tracked within each outreach encounter.  Current medications include:  has a current medication list which includes the following prescription(s): amlodipine, aspirin, benazepril, clonidine, estradiol, hydrochlorothiazide, lantus solostar, insulin lispro (1 unit dial), pen needles, latanoprost, metformin er, omeprazole, and semaglutide(0.25 or 0.5mg/dos). and the patient is reported to be patient is compliant with medication protocol,  Medications are reported to be effective, not checking glucose or b/p on regular basis. b/p in office 142/70.  Regarding these diagnoses, referrals were made to the following provider(s):  Encouraged to keep all scheduled appointmentes.  All notes on chart for PCP to review.    The patient was monitored remotely for blood pressure; blood glucose; medications.    The patient's physical needs include:  needs met.     The patient's mental support needs include:  needs met    The patient's cognitive support needs include:  needs met    The patient's psychosocial support needs include:  continued support, aphagia--uses written word to communicate    The patient's functional needs include: physical  healthcare, aphasia. communicates with written word and via Mychart to medical team    The patient's environmental needs include:  no needs    Care Plan overall comments:  No data recorded    Refer to previous outreach notes for more information on the areas listed above.    Monthly Billing Diagnoses  (E11.9,  Z79.4) Diabetes mellitus, type II, insulin dependent (HCC)    (I10) Hypertension, unspecified type    Medications   Medications have been reconciled    Care Plan progress this month:      Recently Modified Care Plans Updates made since 6/26/2023 12:00 AM      No recently modified care plans.                Instructions   Patient was provided an electronic copy of care plan  CCM services were explained and offered and patient has accepted these services.  Patient has given their written consent to receive CCM services and understands that this includes the authorization of electronic communication of medical information with the other treating providers.  Patient understands that they may stop CCM services at any time and these changes will be effective at the end of the calendar month and will effectively revocate the agreement of CCM services.  Patient understands that only one practitioner can furnish and be paid for CCM services during one calendar month.  Patient also understands that there may be co-payment or deductible fees in association with CCM services.  Patient will continue with at least monthly follow-up calls with the Ambulatory .    Sachi GARCIA  Ambulatory Case Management    7/27/2023, 08:20 EDT

## 2023-08-03 ENCOUNTER — LAB (OUTPATIENT)
Dept: LAB | Facility: HOSPITAL | Age: 73
End: 2023-08-03
Payer: MEDICARE

## 2023-08-03 PROCEDURE — 85025 COMPLETE CBC W/AUTO DIFF WBC: CPT | Performed by: FAMILY MEDICINE

## 2023-08-03 PROCEDURE — 80053 COMPREHEN METABOLIC PANEL: CPT | Performed by: FAMILY MEDICINE

## 2023-08-03 PROCEDURE — 80061 LIPID PANEL: CPT | Performed by: FAMILY MEDICINE

## 2023-08-03 PROCEDURE — 84443 ASSAY THYROID STIM HORMONE: CPT | Performed by: FAMILY MEDICINE

## 2023-08-03 PROCEDURE — 83036 HEMOGLOBIN GLYCOSYLATED A1C: CPT | Performed by: FAMILY MEDICINE

## 2023-08-08 ENCOUNTER — PATIENT OUTREACH (OUTPATIENT)
Dept: CASE MANAGEMENT | Facility: OTHER | Age: 73
End: 2023-08-08
Payer: MEDICARE

## 2023-08-08 ENCOUNTER — TELEPHONE (OUTPATIENT)
Dept: CASE MANAGEMENT | Facility: OTHER | Age: 73
End: 2023-08-08
Payer: MEDICARE

## 2023-08-08 DIAGNOSIS — Z79.4 DIABETES MELLITUS, TYPE II, INSULIN DEPENDENT: Primary | ICD-10-CM

## 2023-08-08 DIAGNOSIS — E11.9 DIABETES MELLITUS, TYPE II, INSULIN DEPENDENT: Primary | ICD-10-CM

## 2023-08-08 DIAGNOSIS — I10 HYPERTENSION, UNSPECIFIED TYPE: ICD-10-CM

## 2023-08-08 RX ORDER — INSULIN GLARGINE 100 [IU]/ML
32 INJECTION, SOLUTION SUBCUTANEOUS NIGHTLY
COMMUNITY

## 2023-08-08 NOTE — OUTREACH NOTE
CCM Interim Update    Face to face with PCP. Lab results show A1C has increased from 7.5 to 9.00. instructions as follows:    Lantus insulin to be increased to 32 u each night  Continue Humalog SS = 10u + 2u for every 50 over 150. Max dose daily of 30 u.  Continue Ozempic 0.25 mg weekly.    Health food choices and exercise.    Glucose readings daily.    A1C to be drawn in 3 months.     Pt with aphasia. I have sent [employer] message along with pt education.           Name and Relationship of Patient/Support Person: Charis Hill - Self          Education Documentation  Healthy Food Choices, taught by Sachi Reynolds, RN at 8/8/2023  1:39 PM.  Learner: Patient  Readiness: Acceptance  Method: Handout  Response: Needs Reinforcement          Sachi GARCIA  Ambulatory Case Management    8/8/2023, 13:39 EDT

## 2023-08-09 DIAGNOSIS — E11.65 TYPE 2 DIABETES MELLITUS WITH HYPERGLYCEMIA, WITH LONG-TERM CURRENT USE OF INSULIN: Primary | ICD-10-CM

## 2023-08-09 DIAGNOSIS — Z79.4 TYPE 2 DIABETES MELLITUS WITH HYPERGLYCEMIA, WITH LONG-TERM CURRENT USE OF INSULIN: Primary | ICD-10-CM

## 2023-08-09 DIAGNOSIS — Z79.899 MEDICATION MANAGEMENT: ICD-10-CM

## 2023-08-29 ENCOUNTER — PATIENT OUTREACH (OUTPATIENT)
Dept: CASE MANAGEMENT | Facility: OTHER | Age: 73
End: 2023-08-29
Payer: MEDICARE

## 2023-08-29 DIAGNOSIS — Z79.4 DIABETES MELLITUS, TYPE II, INSULIN DEPENDENT: Primary | ICD-10-CM

## 2023-08-29 DIAGNOSIS — E11.9 DIABETES MELLITUS, TYPE II, INSULIN DEPENDENT: Primary | ICD-10-CM

## 2023-08-29 DIAGNOSIS — I10 HYPERTENSION, UNSPECIFIED TYPE: ICD-10-CM

## 2023-08-29 NOTE — OUTREACH NOTE
[facility] End of Month Documentation    This Chronic Medical Management Care Plan for Charis Hill, 73 y.o. female, has been monitored and managed; reviewed and a new plan of care implemented for the month of August.  A cumulative time of 34  minutes was spent on this patient record this month, including chart review; face to face with provider; electronic communication with patient, lab review, insulin adjustment.    Regarding the patient's problems: has Diabetes mellitus, type II, insulin dependent; Hypertension; Hyperlipidemia; Advance directive declined by patient; and Speech disturbance on their problem list., the following items were addressed: medications, glucose log, healthy food choices and any changes can be found within the plan section of the note.  A detailed listing of time spent for chronic care management is tracked within each outreach encounter.  Current medications include:  has a current medication list which includes the following prescription(s): amlodipine, aspirin, benazepril, clonidine, estradiol, hydrochlorothiazide, insulin glargine, insulin lispro (1 unit dial), pen needles, latanoprost, metformin er, omeprazole, and semaglutide(0.25 or 0.5mg/dos). and the patient is reported to be patient is noncompliant with medication protocol,  Medications are reported to be non-effective in controlling symptoms and changes have been made to the medication protocol, A1C increased to 9 from 7.5.  Regarding these diagnoses, referrals were made to the following provider(s):  encouraged to keep all scheduled appointments.  All notes on chart for PCP to review.    The patient was monitored remotely for blood glucose; medications; activity level, diet,med compliance.    The patient's physical needs include:  needs met.     The patient's mental support needs include:  needs met    The patient's cognitive support needs include:  needs met    The patient's psychosocial support needs include:  continued support,  aphagia--uses written word to communicate    The patient's functional needs include: physical healthcare, aphasia. communicates with written word and via Mychart to medical team    The patient's environmental needs include:  no needs    Care Plan overall comments:  No data recorded    Refer to previous outreach notes for more information on the areas listed above.    Monthly Billing Diagnoses  (E11.9,  Z79.4) Diabetes mellitus, type II, insulin dependent (HCC)    (I10) Hypertension, unspecified type    Medications   Medications have been reconciled    Care Plan progress this month:      Recently Modified Care Plans Updates made since 7/29/2023 12:00 AM      No recently modified care plans.                Instructions   Patient was provided an electronic copy of care plan  CCM services were explained and offered and patient has accepted these services.  Patient has given their written consent to receive CCM services and understands that this includes the authorization of electronic communication of medical information with the other treating providers.  Patient understands that they may stop CCM services at any time and these changes will be effective at the end of the calendar month and will effectively revocate the agreement of CCM services.  Patient understands that only one practitioner can furnish and be paid for CCM services during one calendar month.  Patient also understands that there may be co-payment or deductible fees in association with CCM services.  Patient will continue with at least monthly follow-up calls with the Ambulatory .    Sachi GARCIA  Ambulatory Case Management    8/29/2023, 15:07 EDT

## 2023-09-01 ENCOUNTER — PATIENT OUTREACH (OUTPATIENT)
Dept: CASE MANAGEMENT | Facility: OTHER | Age: 73
End: 2023-09-01
Payer: MEDICARE

## 2023-09-01 ENCOUNTER — TELEPHONE (OUTPATIENT)
Dept: CASE MANAGEMENT | Facility: OTHER | Age: 73
End: 2023-09-01
Payer: MEDICARE

## 2023-09-01 DIAGNOSIS — Z79.4 DIABETES MELLITUS, TYPE II, INSULIN DEPENDENT: Primary | ICD-10-CM

## 2023-09-01 DIAGNOSIS — E11.9 DIABETES MELLITUS, TYPE II, INSULIN DEPENDENT: Primary | ICD-10-CM

## 2023-09-01 DIAGNOSIS — I10 HYPERTENSION, UNSPECIFIED TYPE: ICD-10-CM

## 2023-09-01 NOTE — TELEPHONE ENCOUNTER
Chart reviewed. Contacted scheduling and obtained follow up appt for Ms Hill. Contacted her via [employer] with information.

## 2023-09-01 NOTE — OUTREACH NOTE
CCM Interim Update    See [employer] message        Name and Relationship of Patient/Support Person:  -         Education Documentation  No documentation found.        Sachi GARCIA  Ambulatory Case Management    9/1/2023, 10:07 EDT

## 2023-09-26 ENCOUNTER — PATIENT OUTREACH (OUTPATIENT)
Dept: CASE MANAGEMENT | Facility: OTHER | Age: 73
End: 2023-09-26
Payer: MEDICARE

## 2023-09-26 DIAGNOSIS — Z79.4 DIABETES MELLITUS, TYPE II, INSULIN DEPENDENT: Primary | ICD-10-CM

## 2023-09-26 DIAGNOSIS — I10 HYPERTENSION, UNSPECIFIED TYPE: ICD-10-CM

## 2023-09-26 DIAGNOSIS — E11.9 DIABETES MELLITUS, TYPE II, INSULIN DEPENDENT: Primary | ICD-10-CM

## 2023-09-26 NOTE — OUTREACH NOTE
[facility] End of Month Documentation    This Chronic Medical Management Care Plan for Charis Hill, 73 y.o. female, has been monitored and managed; reviewed and a new plan of care implemented for the month of September.  A cumulative time of 20  minutes was spent on this patient record this month, including chart review; electronic communication with patient, [employer] with new appt.    Regarding the patient's problems: has Diabetes mellitus, type II, insulin dependent; Hypertension; Hyperlipidemia; Advance directive declined by patient; and Speech disturbance on their problem list., the following items were addressed: medications, glucose log, healthy food choices and any changes can be found within the plan section of the note.  A detailed listing of time spent for chronic care management is tracked within each outreach encounter.  Current medications include:  has a current medication list which includes the following prescription(s): amlodipine, aspirin, benazepril, clonidine, estradiol, hydrochlorothiazide, insulin glargine, insulin lispro (1 unit dial), pen needles, latanoprost, metformin er, omeprazole, and semaglutide(0.25 or 0.5mg/dos). and the patient is reported to be patient is noncompliant with medication protocol,  Medications are reported to be non-effective in controlling symptoms and changes have been made to the medication protocol, A1C increased to 9 from 7.5.  Regarding these diagnoses, referrals were made to the following provider(s):  Encouraged to keep all scheduled appointments.  All notes on chart for PCP to review.    The patient was monitored remotely for blood glucose; medications; activity level, diet,med compliance.    The patient's physical needs include:  needs met.     The patient's mental support needs include:  needs met    The patient's cognitive support needs include:  needs met    The patient's psychosocial support needs include:  continued support, aphagia--uses written word  to communicate    The patient's functional needs include: aphasia. communicates with written word and via Mychart to medical team    The patient's environmental needs include:  no needs    Care Plan overall comments:  No data recorded    Refer to previous outreach notes for more information on the areas listed above.    Monthly Billing Diagnoses  (E11.9,  Z79.4) Diabetes mellitus, type II, insulin dependent (HCC)    (I10) Hypertension, unspecified type    Medications   Medications have been reconciled    Care Plan progress this month:      Recently Modified Care Plans Updates made since 8/26/2023 12:00 AM      No recently modified care plans.                Instructions   Patient was provided an electronic copy of care plan  CCM services were explained and offered and patient has accepted these services.  Patient has given their written consent to receive CCM services and understands that this includes the authorization of electronic communication of medical information with the other treating providers.  Patient understands that they may stop CCM services at any time and these changes will be effective at the end of the calendar month and will effectively revocate the agreement of CCM services.  Patient understands that only one practitioner can furnish and be paid for CCM services during one calendar month.  Patient also understands that there may be co-payment or deductible fees in association with CCM services.  Patient will continue with at least monthly follow-up calls with the Ambulatory .    Sachi GARCIA  Ambulatory Case Management    9/26/2023, 09:58 EDT

## 2023-10-05 ENCOUNTER — TELEPHONE (OUTPATIENT)
Dept: CASE MANAGEMENT | Facility: OTHER | Age: 73
End: 2023-10-05
Payer: MEDICARE

## 2023-10-05 ENCOUNTER — PATIENT OUTREACH (OUTPATIENT)
Dept: CASE MANAGEMENT | Facility: OTHER | Age: 73
End: 2023-10-05
Payer: MEDICARE

## 2023-10-05 DIAGNOSIS — Z79.4 DIABETES MELLITUS, TYPE II, INSULIN DEPENDENT: Primary | ICD-10-CM

## 2023-10-05 DIAGNOSIS — I10 HYPERTENSION, UNSPECIFIED TYPE: ICD-10-CM

## 2023-10-05 DIAGNOSIS — E11.9 DIABETES MELLITUS, TYPE II, INSULIN DEPENDENT: Primary | ICD-10-CM

## 2023-10-05 NOTE — OUTREACH NOTE
CCM Interim Update    Pt with aphasia. [email or web address] message sent.         Name and Relationship of Patient/Support Person: Charis Hill - Self        Education Documentation  No documentation found.        Sachi GARCIA  Ambulatory Case Management    10/5/2023, 15:39 EDT

## 2023-10-26 ENCOUNTER — OFFICE VISIT (OUTPATIENT)
Dept: FAMILY MEDICINE CLINIC | Facility: CLINIC | Age: 73
End: 2023-10-26
Payer: MEDICARE

## 2023-10-26 VITALS
BODY MASS INDEX: 31.92 KG/M2 | OXYGEN SATURATION: 98 % | DIASTOLIC BLOOD PRESSURE: 78 MMHG | SYSTOLIC BLOOD PRESSURE: 158 MMHG | WEIGHT: 198.6 LBS | HEART RATE: 102 BPM | TEMPERATURE: 98.3 F | HEIGHT: 66 IN

## 2023-10-26 DIAGNOSIS — B35.1 ONYCHOMYCOSIS: ICD-10-CM

## 2023-10-26 DIAGNOSIS — I10 PRIMARY HYPERTENSION: ICD-10-CM

## 2023-10-26 DIAGNOSIS — I15.8 OTHER SECONDARY HYPERTENSION: ICD-10-CM

## 2023-10-26 DIAGNOSIS — E11.9 DIABETES MELLITUS, TYPE II, INSULIN DEPENDENT: ICD-10-CM

## 2023-10-26 DIAGNOSIS — Z79.4 DIABETES MELLITUS, TYPE II, INSULIN DEPENDENT: ICD-10-CM

## 2023-10-26 DIAGNOSIS — R47.01 APHASIA: ICD-10-CM

## 2023-10-26 DIAGNOSIS — E11.65 TYPE 2 DIABETES MELLITUS WITH HYPERGLYCEMIA, WITH LONG-TERM CURRENT USE OF INSULIN: Primary | ICD-10-CM

## 2023-10-26 DIAGNOSIS — Z79.899 MEDICATION MANAGEMENT: ICD-10-CM

## 2023-10-26 DIAGNOSIS — Z79.4 TYPE 2 DIABETES MELLITUS WITH HYPERGLYCEMIA, WITH LONG-TERM CURRENT USE OF INSULIN: Primary | ICD-10-CM

## 2023-10-26 DIAGNOSIS — E78.5 HYPERLIPIDEMIA, UNSPECIFIED HYPERLIPIDEMIA TYPE: ICD-10-CM

## 2023-10-26 PROBLEM — I63.9 STROKE: Status: ACTIVE | Noted: 2023-10-26

## 2023-10-26 RX ORDER — METFORMIN HYDROCHLORIDE 500 MG/1
1000 TABLET, EXTENDED RELEASE ORAL
Qty: 30 TABLET | Refills: 2 | Status: SHIPPED | OUTPATIENT
Start: 2023-10-26 | End: 2023-10-30 | Stop reason: SDUPTHER

## 2023-10-26 RX ORDER — ROSUVASTATIN CALCIUM 5 MG/1
5 TABLET, COATED ORAL DAILY
Qty: 30 TABLET | Refills: 2 | Status: SHIPPED | OUTPATIENT
Start: 2023-10-26 | End: 2023-10-30 | Stop reason: SDUPTHER

## 2023-10-26 NOTE — PROGRESS NOTES
"Chief Complaint  Establish Care (Here to establish care), Diabetes, and Hypertension    Subjective      History of Present Illness  Charis Hill is a 73 y.o. female who presents to [facility] with a past medical history of  Past Medical History:   Diagnosis Date    Advance directive declined by patient 02/26/2020    Aphagia 08/14/2019    Diabetes mellitus, type II     Glaucoma     Hyperlipidemia     Hypertension     Insulin dependent type 2 diabetes mellitus, uncontrolled 02/26/2020    Medication management 02/26/2020         Log glycemia for the next visit.     Uncontrolled diabetes. A1c and labs for the next visit.     Metformin increased from 500mg to 1000mg    Ozempic from .25 to .05     Insulin adjusted. NO sliding scale. Please basal bolus insulin. Start lispro at 3 units pre meal. Glycemia log to be checked in the next visit. Eval for hypoglycemia events if any.     Podiatry and ophthalmology.     CKD follow up.     Following with ophthalmologist Dr tej Craft aphasia: after stroke     Start Statins     BP elevated but pt say she is nervous. She will keep BP log. She is on clonidine. Not ideal BP med and not a first line. Due to rebound hypertension and uncontrolled diabetes will keep this med and try to switch to a first line in another visit.     Objective   Vital Signs:   Vitals:    10/26/23 1006   BP: 158/78   Pulse: 102   Temp: 98.3 °F (36.8 °C)   SpO2: 98%   Weight: 90.1 kg (198 lb 9.6 oz)   Height: 167.6 cm (66\")       Wt Readings from Last 3 Encounters:   10/26/23 90.1 kg (198 lb 9.6 oz)   07/13/23 94.8 kg (209 lb)   04/27/23 96.2 kg (212 lb)     BP Readings from Last 3 Encounters:   10/26/23 158/78   07/13/23 142/70   04/27/23 164/80         Physical Exam  Vitals reviewed.   Constitutional:       Comments: Broca aphasia    HENT:      Head: Normocephalic.      Mouth/Throat:      Mouth: Mucous membranes are moist.   Eyes:      Pupils: Pupils are equal, round, " and reactive to light.   Cardiovascular:      Rate and Rhythm: Normal rate.   Abdominal:      General: Abdomen is flat.   Musculoskeletal:         General: Normal range of motion.      Cervical back: Normal range of motion.   Skin:     General: Skin is warm.      Capillary Refill: Capillary refill takes less than 2 seconds.   Neurological:      Mental Status: She is alert.            Result Review :  The following data was reviewed by: Jaime Phelan MD on 10/26/2023:             Assessment and Plan   Diagnoses and all orders for this visit:    1. Type 2 diabetes mellitus with hyperglycemia, with long-term current use of insulin (Primary)  -     Ambulatory Referral to [facility] [place]  -     metFORMIN ER (GLUCOPHAGE-XR) 500 MG 24 hr tablet; Take 2 tablets by mouth Daily With Breakfast for 90 days.  Dispense: 30 tablet; Refill: 2  -     Insulin Lispro, 0.5 Unit Dial, (HUMALOG) 100 UNIT/ML solution pen-injector; Inject 3 Units under the skin into the appropriate area as directed 3 (Three) Times a Day Before Meals for 90 days.  Dispense: 2.7 mL; Refill: 2  -     Ambulatory Referral to Podiatry  -     glucose blood test strip; Use as instructed; check blood sugar TID  Dispense: 100 each; Refill: 5  -     Semaglutide,0.25 or 0.5MG/DOS, (OZEMPIC) 2 MG/1.5ML solution pen-injector; Inject 0.5 mg under the skin into the appropriate area as directed 1 (One) Time Per Week for 19 days.  Dispense: 1 mL; Refill: 0  -     Hemoglobin A1c; Future    2. Medication management    3. Primary hypertension  -     Comprehensive Metabolic Panel; Future  -     CBC & Differential; Future    4. Aphasia  -     Ambulatory Referral to Neurology    5. Other secondary hypertension    6. Diabetes mellitus, type II, insulin dependent (HCC)    7. Onychomycosis  -     Ambulatory Referral to Podiatry    8. Hyperlipidemia, unspecified hyperlipidemia type  -     Lipid Panel; Future  -     rosuvastatin (Crestor) 5 MG tablet; Take  1 tablet by mouth Daily for 90 days.  Dispense: 30 tablet; Refill: 2                  FOLLOW UP  No follow-ups on file.  Patient was given instructions and counseling regarding her condition or for health maintenance advice. Please see specific information pulled into the AVS if appropriate.       Jaime Phelan MD  10/26/23  12:15 EDT    CURRENT & DISCONTINUED MEDICATIONS  Current Outpatient Medications   Medication Instructions    amLODIPine (NORVASC) 10 mg, Oral, Daily    aspirin 81 mg, Oral, Daily    benazepril (LOTENSIN) 40 mg, Oral, Daily    cloNIDine (CATAPRES) 0.1 mg, Oral, 2 Times Daily    glucose blood test strip Use as instructed; check blood sugar TID    hydroCHLOROthiazide (HYDRODIURIL) 25 mg, Oral, Daily    insulin glargine (LANTUS, SEMGLEE) 28 Units, Subcutaneous, Nightly, 28 units daily    Insulin Lispro (0.5 Unit Dial) (HUMALOG) 3 Units, Subcutaneous, 3 Times Daily Before Meals    Insulin Pen Needle (Pen Needles) 31G X 8 MM misc 1 Device, Does not apply, 4 Times Daily After Meals & at Bedtime, Diabetes type II uncontrolled    latanoprost (XALATAN) 0.005 % ophthalmic solution No dose, route, or frequency recorded.    metFORMIN ER (GLUCOPHAGE-XR) 1,000 mg, Oral, Daily With Breakfast    omeprazole (PRILOSEC) 40 mg, Oral, Daily    rosuvastatin (CRESTOR) 5 mg, Oral, Daily    Semaglutide(0.25 or 0.5MG/DOS) (OZEMPIC) 0.5 mg, Subcutaneous, Weekly       Medications Discontinued During This Encounter   Medication Reason    estradiol (ESTRACE VAGINAL) 0.1 MG/GM vaginal cream *Therapy completed    metFORMIN ER (GLUCOPHAGE-XR) 500 MG 24 hr tablet Duplicate order    Insulin Lispro, 1 Unit Dial, (HumaLOG KwikPen) 100 UNIT/ML solution pen-injector Duplicate order    Semaglutide,0.25 or 0.5MG/DOS, (OZEMPIC) 2 MG/1.5ML solution pen-injector Reorder

## 2023-10-27 ENCOUNTER — PATIENT OUTREACH (OUTPATIENT)
Dept: CASE MANAGEMENT | Facility: OTHER | Age: 73
End: 2023-10-27
Payer: MEDICARE

## 2023-10-27 ENCOUNTER — TELEPHONE (OUTPATIENT)
Dept: CASE MANAGEMENT | Facility: OTHER | Age: 73
End: 2023-10-27
Payer: MEDICARE

## 2023-10-27 DIAGNOSIS — E11.9 DIABETES MELLITUS, TYPE II, INSULIN DEPENDENT: Primary | ICD-10-CM

## 2023-10-27 DIAGNOSIS — I10 HYPERTENSION, UNSPECIFIED TYPE: ICD-10-CM

## 2023-10-27 DIAGNOSIS — Z79.4 DIABETES MELLITUS, TYPE II, INSULIN DEPENDENT: Primary | ICD-10-CM

## 2023-10-27 DIAGNOSIS — E78.5 HYPERLIPIDEMIA, UNSPECIFIED HYPERLIPIDEMIA TYPE: ICD-10-CM

## 2023-10-27 DIAGNOSIS — R47.01 APHASIA: ICD-10-CM

## 2023-10-27 NOTE — OUTREACH NOTE
[facility] End of Month Documentation    This Chronic Medical Management Care Plan for Charis Hill, 73 y.o. female, has been reviewed; monitored and managed and a new plan of care implemented for the month of October.  A cumulative time of 46 minutes was spent on this patient record this month, including chart review; electronic communication with patient; face to face with provider, med change and referrals discussed with PCP.    Regarding the patient's problems: has Diabetes mellitus, type II, insulin dependent; Hypertension; Hyperlipidemia; Advance directive declined by patient; Speech disturbance; and Stroke on their problem list., the following items were addressed: medications, glucose log, healthy food choices,keep daily glucose and b/p logs and any changes can be found within the plan section of the note.  A detailed listing of time spent for chronic care management is tracked within each outreach encounter.  Current medications include:  has a current medication list which includes the following prescription(s): amlodipine, aspirin, benazepril, clonidine, glucose blood, hydrochlorothiazide, insulin glargine, insulin lispro (0.5 unit dial), pen needles, latanoprost, metformin er, omeprazole, rosuvastatin, and semaglutide(0.25 or 0.5mg/dos). and the patient is reported to be she reports compliant with medications,  Medications are reported to be non-effective in controlling symptoms and changes have been made to the medication protocol, metformin and ozempic increased.  Regarding these diagnoses, referrals were made to the following provider(s):  Encouraged to keep all scheduled appointments.  All notes on chart for PCP to review.    The patient was monitored remotely for blood pressure; blood glucose; medications, diet,med compliance.    The patient's physical needs include:  needs met.     The patient's mental support needs include:  needs met    The patient's cognitive support needs include:  needs met    The  patient's psychosocial support needs include:  continued support, aphagia--uses written word to communicate    The patient's functional needs include: physical healthcare, aphasia. communicates with written word and via Mychart to medical team    The patient's environmental needs include:  no needs    Care Plan overall comments:  No data recorded    Refer to previous outreach notes for more information on the areas listed above.    Monthly Billing Diagnoses  (E11.9,  Z79.4) Diabetes mellitus, type II, insulin dependent (HCC)    (I10) Hypertension, unspecified type    (E78.5) Hyperlipidemia, unspecified hyperlipidemia type    (R47.01) Aphasia    Medications   Medications have been reconciled    Care Plan progress this month:      Recently Modified Care Plans Updates made since 9/26/2023 12:00 AM       Diabetes Type 2 (Adult)           Problem Priority Last Modified     ELS GLYCEMIC MANAGEMENT (DIABETES, TYPE 2) (ADULT) --  3/23/2022  2:53 PM by Sachi Reynolds RN              Goal Recent Progress Last Modified     Glycemic Management Optimized --  10/27/2023  7:56 AM by Sachi Reynolds RN     Evidence-based guidance:   Anticipate A1C testing (point-of-care) every 3 to 6 months based on goal attainment.   Review mutually-set A1C goal or target range.   Anticipate use of antihyperglycemic with or without insulin and periodic adjustments; consider active involvement of pharmacist.   Provide medical nutrition therapy and development of individualized eating.   Compare self-reported symptoms of hypo or hyperglycemia to blood glucose levels, diet and fluid intake, current medications, psychosocial and physiologic stressors, change in activity and barriers to care adherence.   Promote self-monitoring of blood glucose levels.   Assess and address barriers to management plan, such as food insecurity, age, developmental ability, depression, anxiety, fear of hypoglycemia or weight gain, as well as medication cost, side effects  and complicated regimen.   Consider referral to community-based diabetes education program, visiting nurse, community health worker or health .   Encourage regular dental care for treatment of periodontal disease; refer to dental provider when needed.    Notes:                Problem Priority Last Modified     ELS DISEASE PROGRESSION (DIABETES, TYPE 2) (ADULT) --  3/23/2022  2:53 PM by Sachi Reynolds RN              Goal Recent Progress Last Modified     Disease Progression Prevented or Minimized --  10/27/2023  7:56 AM by Sachi Reynolds RN     Evidence-based guidance:   Prepare patient for laboratory and diagnostic exams based on risk and presentation.   Encourage lifestyle changes, such as increased intake of plant-based foods, stress reduction, consistent physical activity and smoking cessation to prevent long-term complications and chronic disease.    Individualize activity and exercise recommendations while considering potential limitations, such as neuropathy, retinopathy or the ability to prevent hyperglycemia or hypoglycemia.    Prepare patient for use of pharmacologic therapy that may include antihypertensive, analgesic, prostaglandin E1 with periodic adjustments, based on presenting chronic condition and laboratory results.   Assess signs/symptoms and risk factors for hypertension, sleep-disordered breathing, neuropathy (including changes in gait and balance), retinopathy, nephropathy and sexual dysfunction.   Address pregnancy planning and contraceptive choice, especially when prescribing antihypertensive or statin.   Ensure completion of annual comprehensive foot exam and dilated eye exam.    Implement additional individualized goals and interventions based on identified risk factors.   Prepare patient for consultation or referral for specialist care, such as ophthalmology, neurology, cardiology, podiatry, nephrology or perinatology.    Notes:                              Instructions   Patient was  provided an electronic copy of care plan  CCM services were explained and offered and patient has accepted these services.  Patient has given their written consent to receive CCM services and understands that this includes the authorization of electronic communication of medical information with the other treating providers.  Patient understands that they may stop CCM services at any time and these changes will be effective at the end of the calendar month and will effectively revocate the agreement of CCM services.  Patient understands that only one practitioner can furnish and be paid for CCM services during one calendar month.  Patient also understands that there may be co-payment or deductible fees in association with CCM services.  Patient will continue with at least monthly follow-up calls with the Ambulatory .    Sachi GARCIA  Ambulatory Case Management    10/27/2023, 08:21 EDT

## 2023-10-30 ENCOUNTER — TELEPHONE (OUTPATIENT)
Dept: CASE MANAGEMENT | Facility: OTHER | Age: 73
End: 2023-10-30
Payer: MEDICARE

## 2023-10-30 RX ORDER — METFORMIN HYDROCHLORIDE 500 MG/1
1000 TABLET, EXTENDED RELEASE ORAL
Qty: 180 TABLET | Refills: 0 | Status: SHIPPED | OUTPATIENT
Start: 2023-10-30 | End: 2024-01-28

## 2023-10-30 RX ORDER — ROSUVASTATIN CALCIUM 5 MG/1
5 TABLET, COATED ORAL DAILY
Qty: 90 TABLET | Refills: 0 | Status: SHIPPED | OUTPATIENT
Start: 2023-10-30 | End: 2024-01-28

## 2023-10-30 NOTE — TELEPHONE ENCOUNTER
Ms Hill contacted me about her medication that was sent to [facility] on 10/26/23. Since they are mail order they request 90 supply sent for each med. Can you resend Metformin,Humalog,Ozempic and Crestor back in changing to 90 days of each?  Thank you!

## 2023-11-01 ENCOUNTER — PATIENT OUTREACH (OUTPATIENT)
Dept: CASE MANAGEMENT | Facility: OTHER | Age: 73
End: 2023-11-01
Payer: MEDICARE

## 2023-11-01 ENCOUNTER — TELEPHONE (OUTPATIENT)
Dept: CASE MANAGEMENT | Facility: OTHER | Age: 73
End: 2023-11-01
Payer: MEDICARE

## 2023-11-01 DIAGNOSIS — E11.9 DIABETES MELLITUS, TYPE II, INSULIN DEPENDENT: Primary | ICD-10-CM

## 2023-11-01 DIAGNOSIS — R47.01 APHASIA: ICD-10-CM

## 2023-11-01 DIAGNOSIS — E78.5 HYPERLIPIDEMIA, UNSPECIFIED HYPERLIPIDEMIA TYPE: ICD-10-CM

## 2023-11-01 DIAGNOSIS — Z79.4 DIABETES MELLITUS, TYPE II, INSULIN DEPENDENT: Primary | ICD-10-CM

## 2023-11-01 DIAGNOSIS — I10 HYPERTENSION, UNSPECIFIED TYPE: ICD-10-CM

## 2023-11-01 NOTE — TELEPHONE ENCOUNTER
[facility] mail order pharmacy did not receive new erx for Ozempic. It was placed as sample only. Can you please reorder and under class please pick normal.  Thank you!

## 2023-11-01 NOTE — OUTREACH NOTE
CCM Interim Update    Charis contacted me via [employer] reporting she had received message there was a problem with medication refills.    Care Coordination    I called [facility]. They reviewed recent med refills that was sent in. They have no record of Ozempic. Chart reviewed and Ozempic was placed under sample class instead of normal. I have sent task to PCP to resend with correct class.    0

## 2023-11-02 ENCOUNTER — TELEPHONE (OUTPATIENT)
Dept: CASE MANAGEMENT | Facility: OTHER | Age: 73
End: 2023-11-02
Payer: MEDICARE

## 2023-11-03 ENCOUNTER — TELEPHONE (OUTPATIENT)
Dept: CASE MANAGEMENT | Facility: OTHER | Age: 73
End: 2023-11-03
Payer: MEDICARE

## 2023-11-06 ENCOUNTER — TELEPHONE (OUTPATIENT)
Dept: CASE MANAGEMENT | Facility: OTHER | Age: 73
End: 2023-11-06
Payer: MEDICARE

## 2023-11-06 DIAGNOSIS — E11.65 TYPE 2 DIABETES MELLITUS WITH HYPERGLYCEMIA, WITH LONG-TERM CURRENT USE OF INSULIN: ICD-10-CM

## 2023-11-06 DIAGNOSIS — Z79.4 TYPE 2 DIABETES MELLITUS WITH HYPERGLYCEMIA, WITH LONG-TERM CURRENT USE OF INSULIN: ICD-10-CM

## 2023-11-10 ENCOUNTER — TELEPHONE (OUTPATIENT)
Dept: CASE MANAGEMENT | Facility: OTHER | Age: 73
End: 2023-11-10
Payer: MEDICARE

## 2023-11-29 ENCOUNTER — OFFICE VISIT (OUTPATIENT)
Dept: FAMILY MEDICINE CLINIC | Facility: CLINIC | Age: 73
End: 2023-11-29
Payer: MEDICARE

## 2023-11-29 VITALS
OXYGEN SATURATION: 93 % | SYSTOLIC BLOOD PRESSURE: 138 MMHG | DIASTOLIC BLOOD PRESSURE: 74 MMHG | WEIGHT: 195 LBS | HEIGHT: 66 IN | BODY MASS INDEX: 31.34 KG/M2 | HEART RATE: 114 BPM | TEMPERATURE: 97.8 F

## 2023-11-29 DIAGNOSIS — E11.65 TYPE 2 DIABETES MELLITUS WITH HYPERGLYCEMIA, WITH LONG-TERM CURRENT USE OF INSULIN: ICD-10-CM

## 2023-11-29 DIAGNOSIS — R00.2 PALPITATIONS: Primary | ICD-10-CM

## 2023-11-29 DIAGNOSIS — Z79.4 TYPE 2 DIABETES MELLITUS WITH DIABETIC NEUROPATHY, WITH LONG-TERM CURRENT USE OF INSULIN: ICD-10-CM

## 2023-11-29 DIAGNOSIS — E11.40 TYPE 2 DIABETES MELLITUS WITH DIABETIC NEUROPATHY, WITH LONG-TERM CURRENT USE OF INSULIN: ICD-10-CM

## 2023-11-29 DIAGNOSIS — G40.909 NONINTRACTABLE EPILEPSY WITHOUT STATUS EPILEPTICUS, UNSPECIFIED EPILEPSY TYPE: ICD-10-CM

## 2023-11-29 DIAGNOSIS — Z79.4 TYPE 2 DIABETES MELLITUS WITH HYPERGLYCEMIA, WITH LONG-TERM CURRENT USE OF INSULIN: ICD-10-CM

## 2023-11-29 DIAGNOSIS — I15.8 OTHER SECONDARY HYPERTENSION: ICD-10-CM

## 2023-11-29 DIAGNOSIS — H35.3221 EXUDATIVE AGE-RELATED MACULAR DEGENERATION, LEFT EYE, WITH ACTIVE CHOROIDAL NEOVASCULARIZATION: ICD-10-CM

## 2023-11-29 RX ORDER — BENAZEPRIL HYDROCHLORIDE 40 MG/1
40 TABLET, FILM COATED ORAL DAILY
Qty: 90 TABLET | Refills: 1 | Status: SHIPPED | OUTPATIENT
Start: 2023-11-29

## 2023-11-29 RX ORDER — METFORMIN HYDROCHLORIDE 500 MG/1
1000 TABLET, EXTENDED RELEASE ORAL
Qty: 180 TABLET | Refills: 1 | Status: SHIPPED | OUTPATIENT
Start: 2023-11-29

## 2023-11-29 NOTE — PROGRESS NOTES
"Chief Complaint  Diabetes (Follow up diabetes.  Blood sugars running in the 150's at home according to the patient. /Patient states unable to travel to [place] for any appointment and she does not want to do Colonoscopy.  Feels like since BS are up is irritated in her bottom area. )    Subjective      History of Present Illness  Charis Hill is a 73 y.o. female who presents to [facility] with a past medical history of  Past Medical History:   Diagnosis Date    Advance directive declined by patient 02/26/2020    Aphagia 08/14/2019    Diabetes mellitus, type II     Glaucoma     Hyperlipidemia     Hypertension     Insulin dependent type 2 diabetes mellitus, uncontrolled 02/26/2020    Medication management 02/26/2020     Log glycemia---> patient refers glycemia ~150     Uncontrolled diabetes. A1c and labs today follow up in a month.      Metformin was increased from 500mg to 1000mg. Cont same dose.      Ozempic from .25 to .5 last visit---> increase to 1mg today  (patient refers she wont increase dose)     Insulin adjusted last visit. NO sliding scale. She is on basal bolus insulin. Please bring glycemia log to better control and adjustment. Pt understands.        avoid hypoglycemia events.      Podiatry and ophthalmology.      CKD follow up.      Following with ophthalmologist Dr tej Craft aphasia: after stroke. Neuro evaluated. She needs therapy.       Statins: declined statins use.       BP elevated. She is on clonidine. Not ideal BP med and not a first line. Due to rebound hypertension and uncontrolled diabetes will keep this med and try to switch to a first line. Patient is having palpitation ECG ordered. Holter and echo ordered.      Objective   Vital Signs:   Vitals:    11/29/23 1018   BP: 138/74   Pulse: 114   Temp: 97.8 °F (36.6 °C)   SpO2: 93%   Weight: 88.5 kg (195 lb)   Height: 167.6 cm (66\")     Body mass index is 31.47 kg/m².    Wt Readings from Last 3 " Encounters:   11/29/23 88.5 kg (195 lb)   10/26/23 90.1 kg (198 lb 9.6 oz)   07/13/23 94.8 kg (209 lb)     BP Readings from Last 3 Encounters:   11/29/23 138/74   10/26/23 158/78   07/13/23 142/70       Health Maintenance   Topic Date Due    Pneumococcal Vaccine 65+ (1 - PCV) Never done    DIABETIC FOOT EXAM  10/04/2022    DIABETIC EYE EXAM  12/20/2023    HEMOGLOBIN A1C  02/03/2024    ANNUAL WELLNESS VISIT  07/13/2024    URINE MICROALBUMIN  07/13/2024    LIPID PANEL  08/03/2024    BMI FOLLOWUP  11/06/2024    HEPATITIS C SCREENING  Completed    COVID-19 Vaccine  Discontinued    INFLUENZA VACCINE  Discontinued    MAMMOGRAM  Discontinued    DXA SCAN  Discontinued    TDAP/TD VACCINES  Discontinued    ZOSTER VACCINE  Discontinued    COLORECTAL CANCER SCREENING  Discontinued       Physical Exam  Vitals reviewed.   HENT:      Head: Normocephalic.      Mouth/Throat:      Mouth: Mucous membranes are moist.   Eyes:      Pupils: Pupils are equal, round, and reactive to light.   Cardiovascular:      Rate and Rhythm: Normal rate.   Abdominal:      General: Abdomen is flat.   Musculoskeletal:         General: Normal range of motion.      Cervical back: Normal range of motion.   Skin:     General: Skin is warm.      Capillary Refill: Capillary refill takes less than 2 seconds.   Neurological:      Mental Status: She is alert.            Result Review :  The following data was reviewed by: Jaime Phelan MD on 11/29/2023:        ECG 12 Lead    Date/Time: 11/29/2023 12:00 PM  Performed by: Jaime Quintanilla MD    Authorized by: Jaime Quintanilla MD  Comparison: not compared with previous ECG   Previous ECG: no previous ECG available  Rhythm: sinus tachycardia  Rate: tachycardic  QRS axis: normal              Assessment and Plan   Diagnoses and all orders for this visit:    1. Palpitations (Primary)  -     Holter Monitor - 72 Hour Up To 15 Days; Future  -     Adult Transthoracic Echo Complete W/ Cont if  Necessary Per Protocol; Future  -     ECG 12 Lead    2. Other secondary hypertension  -     benazepril (LOTENSIN) 40 MG tablet; Take 1 tablet by mouth Daily.  Dispense: 90 tablet; Refill: 1    3. Type 2 diabetes mellitus with hyperglycemia, with long-term current use of insulin  Comments:  cont ozempic   A1c today  Orders:  -     Hemoglobin A1c; Future  -     metFORMIN ER (GLUCOPHAGE-XR) 500 MG 24 hr tablet; Take 2 tablets by mouth Daily With Breakfast.  Dispense: 180 tablet; Refill: 1    4. Exudative age-related macular degeneration, left eye, with active choroidal neovascularization  Comments:  follow with ophthalmology    5. Nonintractable epilepsy without status epilepticus, unspecified epilepsy type    6. Type 2 diabetes mellitus with diabetic neuropathy, with long-term current use of insulin    Other orders  -     Pneumococcal Conjugate Vaccine 20-Valent (PCV20)                  FOLLOW UP  No follow-ups on file.  Patient was given instructions and counseling regarding her condition or for health maintenance advice. Please see specific information pulled into the AVS if appropriate.       Jaime Phelan MD  11/29/23  12:05 EST    CURRENT & DISCONTINUED MEDICATIONS  Current Outpatient Medications   Medication Instructions    amLODIPine (NORVASC) 10 mg, Oral, Daily    aspirin 81 mg, Oral, Daily    benazepril (LOTENSIN) 40 mg, Oral, Daily    cloNIDine (CATAPRES) 0.1 mg, Oral, 2 Times Daily    glucose blood test strip Use as instructed; check blood sugar TID    hydroCHLOROthiazide (HYDRODIURIL) 25 mg, Oral, Daily    insulin glargine (LANTUS, SEMGLEE) 28 Units, Subcutaneous, Nightly, 28 units daily    Insulin Lispro (0.5 Unit Dial) (HUMALOG) 3 Units, Subcutaneous, 3 Times Daily Before Meals    Insulin Pen Needle (Pen Needles) 31G X 8 MM misc 1 Device, Does not apply, 4 Times Daily After Meals & at Bedtime, Diabetes type II uncontrolled    latanoprost (XALATAN) 0.005 % ophthalmic solution No dose,  route, or frequency recorded.    metFORMIN ER (GLUCOPHAGE-XR) 1,000 mg, Oral, Daily With Breakfast    omeprazole (PRILOSEC) 40 mg, Oral, Daily    rosuvastatin (CRESTOR) 5 mg, Oral, Daily    Semaglutide(0.25 or 0.5MG/DOS) (OZEMPIC) 0.5 mg, Subcutaneous, Weekly       Medications Discontinued During This Encounter   Medication Reason    benazepril (LOTENSIN) 40 MG tablet Reorder    metFORMIN ER (GLUCOPHAGE-XR) 500 MG 24 hr tablet Reorder

## 2023-11-30 ENCOUNTER — PATIENT OUTREACH (OUTPATIENT)
Dept: CASE MANAGEMENT | Facility: OTHER | Age: 73
End: 2023-11-30
Payer: MEDICARE

## 2023-11-30 DIAGNOSIS — E78.5 HYPERLIPIDEMIA, UNSPECIFIED HYPERLIPIDEMIA TYPE: ICD-10-CM

## 2023-11-30 DIAGNOSIS — R47.01 APHASIA: ICD-10-CM

## 2023-11-30 DIAGNOSIS — Z79.4 DIABETES MELLITUS, TYPE II, INSULIN DEPENDENT: Primary | ICD-10-CM

## 2023-11-30 DIAGNOSIS — I10 HYPERTENSION, UNSPECIFIED TYPE: ICD-10-CM

## 2023-11-30 DIAGNOSIS — E11.9 DIABETES MELLITUS, TYPE II, INSULIN DEPENDENT: Primary | ICD-10-CM

## 2023-11-30 NOTE — OUTREACH NOTE
[facility] End of Month Documentation    This Chronic Medical Management Care Plan for Charis Hill, 73 y.o. female, has been monitored and managed; reviewed and a new plan of care implemented for the month of November.  A cumulative time of 75  minutes was spent on this patient record this month, including chart review; face to face with provider; face to face visit with patient, med change and referrals discussed with PCP.    Regarding the patient's problems: has Diabetes mellitus, type II, insulin dependent; Hypertension; Hyperlipidemia; Advance directive declined by patient; Speech disturbance; and Stroke on their problem list., the following items were addressed: medications, glucose log, healthy food choices,keep daily glucose and b/p logs and any changes can be found within the plan section of the note.  A detailed listing of time spent for chronic care management is tracked within each outreach encounter.  Current medications include:  has a current medication list which includes the following prescription(s): amlodipine, aspirin, benazepril, clonidine, glucose blood, hydrochlorothiazide, insulin glargine, insulin lispro (0.5 unit dial), pen needles, latanoprost, metformin er, omeprazole, rosuvastatin, and semaglutide(0.25 or 0.5mg/dos). and the patient is reported to be patient is noncompliant with medication protocol,  Medications are reported to be non-effective in controlling symptoms and changes have been made to the medication protocol, metformin and ozempic increased last month--she declines additional increase in Ozempic.  Regarding these diagnoses, referrals were made to the following provider(s):  Encouraged to keep all scheduled appointments.  All notes on chart for PCP to review.    The patient was monitored remotely for blood pressure; blood glucose; medications.    The patient's physical needs include:  needs met.     The patient's mental support needs include:  needs met    The patient's cognitive  support needs include:  needs met    The patient's psychosocial support needs include:  continued support, aphagia--uses written word to communicate    The patient's functional needs include: physical healthcare, aphasia. communicates with written word and via Mychart to medical team    The patient's environmental needs include:  no needs    Care Plan overall comments:  No data recorded    Refer to previous outreach notes for more information on the areas listed above.    Monthly Billing Diagnoses  (E11.9,  Z79.4) Diabetes mellitus, type II, insulin dependent (HCC)    (I10) Hypertension, unspecified type    (E78.5) Hyperlipidemia, unspecified hyperlipidemia type    (R47.01) Aphasia    Medications   Medications have been reconciled    Care Plan progress this month:      Recently Modified Care Plans Updates made since 10/30/2023 12:00 AM       Diabetes Type 2 (Adult)           Problem Priority Last Modified     ELS GLYCEMIC MANAGEMENT (DIABETES, TYPE 2) (ADULT) --  3/23/2022  2:53 PM by Sachi Reynolds RN              Goal Recent Progress Last Modified     Glycemic Management Optimized --  11/30/2023  8:21 AM by Sachi Reynolds RN     Evidence-based guidance:   Anticipate A1C testing (point-of-care) every 3 to 6 months based on goal attainment.   Review mutually-set A1C goal or target range.   Anticipate use of antihyperglycemic with or without insulin and periodic adjustments; consider active involvement of pharmacist.   Provide medical nutrition therapy and development of individualized eating.   Compare self-reported symptoms of hypo or hyperglycemia to blood glucose levels, diet and fluid intake, current medications, psychosocial and physiologic stressors, change in activity and barriers to care adherence.   Promote self-monitoring of blood glucose levels.   Assess and address barriers to management plan, such as food insecurity, age, developmental ability, depression, anxiety, fear of hypoglycemia or weight gain,  as well as medication cost, side effects and complicated regimen.   Consider referral to community-based diabetes education program, visiting nurse, community health worker or health .   Encourage regular dental care for treatment of periodontal disease; refer to dental provider when needed.    Notes:              Task Due Date Last Modified     Alleviate Barriers to Glycemic Management --  11/30/2023  8:22 AM by Sachi Reynolds RN     Care Management Activities:      - A1C testing facilitated  - blood glucose monitoring encouraged  - use of blood glucose monitoring log promoted      Notes: declined diabetic mgt referral               Problem Priority Last Modified     ELS DISEASE PROGRESSION (DIABETES, TYPE 2) (ADULT) --  3/23/2022  2:53 PM by Sachi Reynolds RN              Goal Recent Progress Last Modified     Disease Progression Prevented or Minimized --  11/30/2023  8:22 AM by Sachi Reynolds RN     Evidence-based guidance:   Prepare patient for laboratory and diagnostic exams based on risk and presentation.   Encourage lifestyle changes, such as increased intake of plant-based foods, stress reduction, consistent physical activity and smoking cessation to prevent long-term complications and chronic disease.    Individualize activity and exercise recommendations while considering potential limitations, such as neuropathy, retinopathy or the ability to prevent hyperglycemia or hypoglycemia.    Prepare patient for use of pharmacologic therapy that may include antihypertensive, analgesic, prostaglandin E1 with periodic adjustments, based on presenting chronic condition and laboratory results.   Assess signs/symptoms and risk factors for hypertension, sleep-disordered breathing, neuropathy (including changes in gait and balance), retinopathy, nephropathy and sexual dysfunction.   Address pregnancy planning and contraceptive choice, especially when prescribing antihypertensive or statin.   Ensure completion of  annual comprehensive foot exam and dilated eye exam.    Implement additional individualized goals and interventions based on identified risk factors.   Prepare patient for consultation or referral for specialist care, such as ophthalmology, neurology, cardiology, podiatry, nephrology or perinatology.    Notes:              Task Due Date Last Modified     Monitor and Manage Follow-up for Comorbidities --  11/30/2023  8:22 AM by Sachi Reynolds RN     Care Management Activities:      - completion of annual dilated eye exam confirmed  - completion of annual foot exam verified  - healthy lifestyle promoted  - reduction of sedentary activity encouraged      Notes:cancelled podiatry appt                             Instructions   Patient was provided an electronic copy of care plan  CCM services were explained and offered and patient has accepted these services.  Patient has given their written consent to receive CCM services and understands that this includes the authorization of electronic communication of medical information with the other treating providers.  Patient understands that they may stop CCM services at any time and these changes will be effective at the end of the calendar month and will effectively revocate the agreement of CCM services.  Patient understands that only one practitioner can furnish and be paid for CCM services during one calendar month.  Patient also understands that there may be co-payment or deductible fees in association with CCM services.  Patient will continue with at least monthly follow-up calls with the Ambulatory .    Sachi GARCIA  Ambulatory Case Management    11/30/2023, 08:27 EST

## 2023-12-04 ENCOUNTER — TELEPHONE (OUTPATIENT)
Dept: CASE MANAGEMENT | Facility: OTHER | Age: 73
End: 2023-12-04
Payer: MEDICARE

## 2023-12-12 DIAGNOSIS — E11.65 TYPE 2 DIABETES MELLITUS WITH HYPERGLYCEMIA, WITH LONG-TERM CURRENT USE OF INSULIN: Primary | ICD-10-CM

## 2023-12-12 DIAGNOSIS — Z79.4 TYPE 2 DIABETES MELLITUS WITH HYPERGLYCEMIA, WITH LONG-TERM CURRENT USE OF INSULIN: Primary | ICD-10-CM

## 2023-12-13 RX ORDER — INSULIN GLARGINE 100 [IU]/ML
INJECTION, SOLUTION SUBCUTANEOUS
Qty: 25 ML | Refills: 3 | Status: SHIPPED | OUTPATIENT
Start: 2023-12-13

## 2023-12-22 ENCOUNTER — TELEPHONE (OUTPATIENT)
Dept: CASE MANAGEMENT | Facility: OTHER | Age: 73
End: 2023-12-22
Payer: MEDICARE

## 2024-01-10 ENCOUNTER — TELEPHONE (OUTPATIENT)
Dept: CASE MANAGEMENT | Facility: OTHER | Age: 74
End: 2024-01-10
Payer: MEDICARE

## 2024-01-15 DIAGNOSIS — E78.5 HYPERLIPIDEMIA, UNSPECIFIED HYPERLIPIDEMIA TYPE: ICD-10-CM

## 2024-01-15 RX ORDER — ROSUVASTATIN CALCIUM 5 MG/1
5 TABLET, COATED ORAL DAILY
Qty: 90 TABLET | Refills: 3 | Status: SHIPPED | OUTPATIENT
Start: 2024-01-15

## 2024-01-18 ENCOUNTER — TELEPHONE (OUTPATIENT)
Dept: CASE MANAGEMENT | Facility: OTHER | Age: 74
End: 2024-01-18
Payer: MEDICARE

## 2024-01-18 NOTE — TELEPHONE ENCOUNTER
Unable to contact via [employer].  Aphasia so cannot use telephone. Missed ECHO. Has upcoming Holter appt. Letter sent.

## 2024-02-02 ENCOUNTER — TELEPHONE (OUTPATIENT)
Dept: CASE MANAGEMENT | Facility: OTHER | Age: 74
End: 2024-02-02
Payer: MEDICARE

## 2024-02-16 ENCOUNTER — TELEPHONE (OUTPATIENT)
Dept: CASE MANAGEMENT | Facility: OTHER | Age: 74
End: 2024-02-16
Payer: MEDICARE

## 2024-03-19 ENCOUNTER — TELEPHONE (OUTPATIENT)
Dept: CASE MANAGEMENT | Facility: OTHER | Age: 74
End: 2024-03-19
Payer: MEDICARE

## 2024-03-27 ENCOUNTER — TELEPHONE (OUTPATIENT)
Dept: CASE MANAGEMENT | Facility: OTHER | Age: 74
End: 2024-03-27
Payer: MEDICARE

## 2024-04-01 ENCOUNTER — TELEPHONE (OUTPATIENT)
Dept: FAMILY MEDICINE CLINIC | Facility: CLINIC | Age: 74
End: 2024-04-01
Payer: MEDICARE

## 2024-04-01 NOTE — TELEPHONE ENCOUNTER
LVM for pt to complete overdue labs and informed her of location, hours and days pt can get them completed at.

## 2024-04-02 ENCOUNTER — HOSPITAL ENCOUNTER (INPATIENT)
Facility: HOSPITAL | Age: 74
LOS: 1 days | Discharge: HOME | End: 2024-04-04
Attending: EMERGENCY MEDICINE | Admitting: INTERNAL MEDICINE
Payer: MEDICARE

## 2024-04-02 DIAGNOSIS — R26.2 DIFFICULTY WALKING: ICD-10-CM

## 2024-04-02 DIAGNOSIS — N17.9 AKI (ACUTE KIDNEY INJURY): Primary | ICD-10-CM

## 2024-04-02 DIAGNOSIS — Z78.9 DECREASED ACTIVITIES OF DAILY LIVING (ADL): ICD-10-CM

## 2024-04-02 DIAGNOSIS — N39.0 ACUTE UTI: ICD-10-CM

## 2024-04-02 LAB
ACETONE BLD QL: NEGATIVE
ALBUMIN SERPL-MCNC: 4.4 G/DL (ref 3.5–5.2)
ALBUMIN/GLOB SERPL: 1 G/DL
ALP SERPL-CCNC: 144 U/L (ref 39–117)
ALT SERPL W P-5'-P-CCNC: 14 U/L (ref 1–33)
ANION GAP SERPL CALCULATED.3IONS-SCNC: 16.5 MMOL/L (ref 5–15)
AST SERPL-CCNC: 18 U/L (ref 1–32)
BACTERIA UR QL AUTO: ABNORMAL /HPF
BASE EXCESS BLDV CALC-SCNC: -2.4 MMOL/L (ref -2–2)
BASOPHILS # BLD AUTO: 0.08 10*3/MM3 (ref 0–0.2)
BASOPHILS NFR BLD AUTO: 0.7 % (ref 0–1.5)
BDY SITE: ABNORMAL
BILIRUB SERPL-MCNC: 0.8 MG/DL (ref 0–1.2)
BILIRUB UR QL STRIP: NEGATIVE
BUN SERPL-MCNC: 37 MG/DL (ref 8–23)
BUN/CREAT SERPL: 11.6 (ref 7–25)
CA-I BLDA-SCNC: 1.18 MMOL/L (ref 1.13–1.32)
CALCIUM SPEC-SCNC: 9.9 MG/DL (ref 8.6–10.5)
CHLORIDE BLDV-SCNC: 99 MMOL/L (ref 98–106)
CHLORIDE SERPL-SCNC: 95 MMOL/L (ref 98–107)
CLARITY UR: ABNORMAL
CO2 SERPL-SCNC: 22.5 MMOL/L (ref 22–29)
COHGB MFR BLD: 1.6 % (ref 0–1.5)
COLOR UR: ABNORMAL
CREAT SERPL-MCNC: 3.19 MG/DL (ref 0.57–1)
DEPRECATED RDW RBC AUTO: 47.5 FL (ref 37–54)
EGFRCR SERPLBLD CKD-EPI 2021: 14.8 ML/MIN/1.73
EOSINOPHIL # BLD AUTO: 0.01 10*3/MM3 (ref 0–0.4)
EOSINOPHIL NFR BLD AUTO: 0.1 % (ref 0.3–6.2)
ERYTHROCYTE [DISTWIDTH] IN BLOOD BY AUTOMATED COUNT: 15.4 % (ref 12.3–15.4)
FHHB: 44.2 % (ref 0–5)
GAS FLOW AIRWAY: 0 LPM
GLOBULIN UR ELPH-MCNC: 4.3 GM/DL
GLUCOSE BLDA-MCNC: 399 MG/DL (ref 65–99)
GLUCOSE BLDC GLUCOMTR-MCNC: 232 MG/DL (ref 70–99)
GLUCOSE SERPL-MCNC: 405 MG/DL (ref 65–99)
GLUCOSE UR STRIP-MCNC: ABNORMAL MG/DL
HCO3 BLDV-SCNC: 21.9 MMOL/L (ref 22–26)
HCT VFR BLD AUTO: 47.6 % (ref 34–46.6)
HGB BLD-MCNC: 15.4 G/DL (ref 12–15.9)
HGB BLDA-MCNC: 15.8 G/DL (ref 11.7–14.6)
HGB UR QL STRIP.AUTO: ABNORMAL
HOLD SPECIMEN: NORMAL
HOLD SPECIMEN: NORMAL
HYALINE CASTS UR QL AUTO: ABNORMAL /LPF
IMM GRANULOCYTES # BLD AUTO: 0.04 10*3/MM3 (ref 0–0.05)
IMM GRANULOCYTES NFR BLD AUTO: 0.4 % (ref 0–0.5)
INHALED O2 CONCENTRATION: 21 %
KETONES UR QL STRIP: ABNORMAL
LACTATE BLDA-SCNC: 1.95 MMOL/L (ref 0.5–2)
LEUKOCYTE ESTERASE UR QL STRIP.AUTO: ABNORMAL
LYMPHOCYTES # BLD AUTO: 2.03 10*3/MM3 (ref 0.7–3.1)
LYMPHOCYTES NFR BLD AUTO: 18.7 % (ref 19.6–45.3)
MCH RBC QN AUTO: 27.5 PG (ref 26.6–33)
MCHC RBC AUTO-ENTMCNC: 32.4 G/DL (ref 31.5–35.7)
MCV RBC AUTO: 84.8 FL (ref 79–97)
METHGB BLD QL: 0.2 % (ref 0–1.5)
MODALITY: ABNORMAL
MONOCYTES # BLD AUTO: 0.9 10*3/MM3 (ref 0.1–0.9)
MONOCYTES NFR BLD AUTO: 8.3 % (ref 5–12)
NEUTROPHILS NFR BLD AUTO: 7.8 10*3/MM3 (ref 1.7–7)
NEUTROPHILS NFR BLD AUTO: 71.8 % (ref 42.7–76)
NITRITE UR QL STRIP: NEGATIVE
NOTE: ABNORMAL
NRBC BLD AUTO-RTO: 0 /100 WBC (ref 0–0.2)
OXYHGB MFR BLDV: 54 % (ref 94–99)
PCO2 BLDV: 37 MM HG (ref 41–51)
PH BLDV: 7.39 PH UNITS (ref 7.31–7.41)
PH UR STRIP.AUTO: 5.5 [PH] (ref 5–8)
PLATELET # BLD AUTO: 321 10*3/MM3 (ref 140–450)
PMV BLD AUTO: 12.1 FL (ref 6–12)
PO2 BLDV: <40.5 MM HG (ref 35–42)
POTASSIUM BLDV-SCNC: 4.9 MMOL/L (ref 3.5–5)
POTASSIUM SERPL-SCNC: 4.8 MMOL/L (ref 3.5–5.2)
PROT SERPL-MCNC: 8.7 G/DL (ref 6–8.5)
PROT UR QL STRIP: ABNORMAL
RBC # BLD AUTO: 5.61 10*6/MM3 (ref 3.77–5.28)
RBC # UR STRIP: ABNORMAL /HPF
REF LAB TEST METHOD: ABNORMAL
SAO2 % BLDCOV: 55 % (ref 45–75)
SODIUM BLDV-SCNC: 137.5 MMOL/L (ref 136–146)
SODIUM SERPL-SCNC: 134 MMOL/L (ref 136–145)
SP GR UR STRIP: 1.02 (ref 1–1.03)
SQUAMOUS #/AREA URNS HPF: ABNORMAL /HPF
UROBILINOGEN UR QL STRIP: ABNORMAL
WBC # UR STRIP: ABNORMAL /HPF
WBC NRBC COR # BLD AUTO: 10.86 10*3/MM3 (ref 3.4–10.8)
WHOLE BLOOD HOLD COAG: NORMAL
WHOLE BLOOD HOLD SPECIMEN: NORMAL

## 2024-04-02 PROCEDURE — 81001 URINALYSIS AUTO W/SCOPE: CPT | Performed by: EMERGENCY MEDICINE

## 2024-04-02 PROCEDURE — 25810000003 LACTATED RINGERS PER 1000 ML: Performed by: FAMILY MEDICINE

## 2024-04-02 PROCEDURE — 80053 COMPREHEN METABOLIC PANEL: CPT | Performed by: EMERGENCY MEDICINE

## 2024-04-02 PROCEDURE — 82009 KETONE BODYS QUAL: CPT

## 2024-04-02 PROCEDURE — 82820 HEMOGLOBIN-OXYGEN AFFINITY: CPT

## 2024-04-02 PROCEDURE — 82948 REAGENT STRIP/BLOOD GLUCOSE: CPT

## 2024-04-02 PROCEDURE — 63710000001 INSULIN DETEMIR PER 5 UNITS: Performed by: FAMILY MEDICINE

## 2024-04-02 PROCEDURE — 99223 1ST HOSP IP/OBS HIGH 75: CPT | Performed by: FAMILY MEDICINE

## 2024-04-02 PROCEDURE — 87086 URINE CULTURE/COLONY COUNT: CPT

## 2024-04-02 PROCEDURE — 83036 HEMOGLOBIN GLYCOSYLATED A1C: CPT | Performed by: FAMILY MEDICINE

## 2024-04-02 PROCEDURE — 25010000002 HEPARIN (PORCINE) PER 1000 UNITS: Performed by: FAMILY MEDICINE

## 2024-04-02 PROCEDURE — 87077 CULTURE AEROBIC IDENTIFY: CPT

## 2024-04-02 PROCEDURE — 82805 BLOOD GASES W/O2 SATURATION: CPT

## 2024-04-02 PROCEDURE — 85025 COMPLETE CBC W/AUTO DIFF WBC: CPT

## 2024-04-02 PROCEDURE — 25010000002 CEFTRIAXONE PER 250 MG

## 2024-04-02 PROCEDURE — 63710000001 INSULIN LISPRO (HUMAN) PER 5 UNITS: Performed by: FAMILY MEDICINE

## 2024-04-02 PROCEDURE — 25810000003 SODIUM CHLORIDE 0.9 % SOLUTION

## 2024-04-02 PROCEDURE — 63710000001 INSULIN REGULAR HUMAN PER 5 UNITS

## 2024-04-02 PROCEDURE — G0378 HOSPITAL OBSERVATION PER HR: HCPCS

## 2024-04-02 PROCEDURE — 99285 EMERGENCY DEPT VISIT HI MDM: CPT

## 2024-04-02 PROCEDURE — 87186 SC STD MICRODIL/AGAR DIL: CPT

## 2024-04-02 RX ORDER — POLYETHYLENE GLYCOL 3350 17 G/17G
17 POWDER, FOR SOLUTION ORAL DAILY PRN
Status: DISCONTINUED | OUTPATIENT
Start: 2024-04-02 | End: 2024-04-04 | Stop reason: HOSPADM

## 2024-04-02 RX ORDER — SODIUM CHLORIDE 9 MG/ML
40 INJECTION, SOLUTION INTRAVENOUS AS NEEDED
Status: DISCONTINUED | OUTPATIENT
Start: 2024-04-02 | End: 2024-04-04 | Stop reason: HOSPADM

## 2024-04-02 RX ORDER — SODIUM CHLORIDE 0.9 % (FLUSH) 0.9 %
10 SYRINGE (ML) INJECTION EVERY 12 HOURS SCHEDULED
Status: DISCONTINUED | OUTPATIENT
Start: 2024-04-02 | End: 2024-04-04 | Stop reason: HOSPADM

## 2024-04-02 RX ORDER — BISACODYL 10 MG
10 SUPPOSITORY, RECTAL RECTAL DAILY PRN
Status: DISCONTINUED | OUTPATIENT
Start: 2024-04-02 | End: 2024-04-04 | Stop reason: HOSPADM

## 2024-04-02 RX ORDER — SODIUM CHLORIDE 0.9 % (FLUSH) 0.9 %
10 SYRINGE (ML) INJECTION AS NEEDED
Status: DISCONTINUED | OUTPATIENT
Start: 2024-04-02 | End: 2024-04-04 | Stop reason: HOSPADM

## 2024-04-02 RX ORDER — ONDANSETRON 2 MG/ML
4 INJECTION INTRAMUSCULAR; INTRAVENOUS EVERY 6 HOURS PRN
Status: DISCONTINUED | OUTPATIENT
Start: 2024-04-02 | End: 2024-04-04 | Stop reason: HOSPADM

## 2024-04-02 RX ORDER — ASPIRIN 81 MG/1
81 TABLET ORAL DAILY
Status: DISCONTINUED | OUTPATIENT
Start: 2024-04-03 | End: 2024-04-04 | Stop reason: HOSPADM

## 2024-04-02 RX ORDER — BISACODYL 5 MG/1
5 TABLET, DELAYED RELEASE ORAL DAILY PRN
Status: DISCONTINUED | OUTPATIENT
Start: 2024-04-02 | End: 2024-04-04 | Stop reason: HOSPADM

## 2024-04-02 RX ORDER — ROSUVASTATIN CALCIUM 5 MG/1
5 TABLET, COATED ORAL NIGHTLY
Status: DISCONTINUED | OUTPATIENT
Start: 2024-04-02 | End: 2024-04-04 | Stop reason: HOSPADM

## 2024-04-02 RX ORDER — HEPARIN SODIUM 5000 [USP'U]/ML
5000 INJECTION, SOLUTION INTRAVENOUS; SUBCUTANEOUS EVERY 12 HOURS SCHEDULED
Status: DISCONTINUED | OUTPATIENT
Start: 2024-04-02 | End: 2024-04-04 | Stop reason: HOSPADM

## 2024-04-02 RX ORDER — PANTOPRAZOLE SODIUM 40 MG/1
40 TABLET, DELAYED RELEASE ORAL
Status: DISCONTINUED | OUTPATIENT
Start: 2024-04-03 | End: 2024-04-04 | Stop reason: HOSPADM

## 2024-04-02 RX ORDER — INSULIN LISPRO 100 [IU]/ML
3-14 INJECTION, SOLUTION INTRAVENOUS; SUBCUTANEOUS
Status: DISCONTINUED | OUTPATIENT
Start: 2024-04-02 | End: 2024-04-04 | Stop reason: HOSPADM

## 2024-04-02 RX ORDER — IBUPROFEN 600 MG/1
1 TABLET ORAL
Status: DISCONTINUED | OUTPATIENT
Start: 2024-04-02 | End: 2024-04-04 | Stop reason: HOSPADM

## 2024-04-02 RX ORDER — SACCHAROMYCES BOULARDII 250 MG
250 CAPSULE ORAL 2 TIMES DAILY
Status: DISCONTINUED | OUTPATIENT
Start: 2024-04-02 | End: 2024-04-04 | Stop reason: HOSPADM

## 2024-04-02 RX ORDER — NICOTINE POLACRILEX 4 MG
15 LOZENGE BUCCAL
Status: DISCONTINUED | OUTPATIENT
Start: 2024-04-02 | End: 2024-04-04 | Stop reason: HOSPADM

## 2024-04-02 RX ORDER — CLONIDINE HYDROCHLORIDE 0.1 MG/1
0.1 TABLET ORAL 2 TIMES DAILY
Status: DISCONTINUED | OUTPATIENT
Start: 2024-04-03 | End: 2024-04-04 | Stop reason: HOSPADM

## 2024-04-02 RX ORDER — AMLODIPINE BESYLATE 10 MG/1
10 TABLET ORAL DAILY
Status: DISCONTINUED | OUTPATIENT
Start: 2024-04-03 | End: 2024-04-04 | Stop reason: HOSPADM

## 2024-04-02 RX ORDER — DEXTROSE MONOHYDRATE 25 G/50ML
25 INJECTION, SOLUTION INTRAVENOUS
Status: DISCONTINUED | OUTPATIENT
Start: 2024-04-02 | End: 2024-04-04 | Stop reason: HOSPADM

## 2024-04-02 RX ORDER — SODIUM CHLORIDE, SODIUM LACTATE, POTASSIUM CHLORIDE, CALCIUM CHLORIDE 600; 310; 30; 20 MG/100ML; MG/100ML; MG/100ML; MG/100ML
125 INJECTION, SOLUTION INTRAVENOUS CONTINUOUS
Status: ACTIVE | OUTPATIENT
Start: 2024-04-02 | End: 2024-04-03

## 2024-04-02 RX ORDER — AMOXICILLIN 250 MG
2 CAPSULE ORAL 2 TIMES DAILY PRN
Status: DISCONTINUED | OUTPATIENT
Start: 2024-04-02 | End: 2024-04-04 | Stop reason: HOSPADM

## 2024-04-02 RX ADMIN — SODIUM CHLORIDE, POTASSIUM CHLORIDE, SODIUM LACTATE AND CALCIUM CHLORIDE 125 ML/HR: 600; 310; 30; 20 INJECTION, SOLUTION INTRAVENOUS at 20:25

## 2024-04-02 RX ADMIN — Medication 250 MG: at 23:55

## 2024-04-02 RX ADMIN — HEPARIN SODIUM 5000 UNITS: 5000 INJECTION INTRAVENOUS; SUBCUTANEOUS at 20:35

## 2024-04-02 RX ADMIN — INSULIN LISPRO 5 UNITS: 100 INJECTION, SOLUTION INTRAVENOUS; SUBCUTANEOUS at 20:35

## 2024-04-02 RX ADMIN — Medication 10 ML: at 20:26

## 2024-04-02 RX ADMIN — ROSUVASTATIN CALCIUM 5 MG: 5 TABLET, FILM COATED ORAL at 23:55

## 2024-04-02 RX ADMIN — CEFTRIAXONE SODIUM 1000 MG: 1 INJECTION, POWDER, FOR SOLUTION INTRAMUSCULAR; INTRAVENOUS at 17:25

## 2024-04-02 RX ADMIN — INSULIN DETEMIR 20 UNITS: 100 INJECTION, SOLUTION SUBCUTANEOUS at 20:35

## 2024-04-02 RX ADMIN — INSULIN HUMAN 7 UNITS: 100 INJECTION, SOLUTION PARENTERAL at 16:51

## 2024-04-02 RX ADMIN — SODIUM CHLORIDE 2000 ML: 9 INJECTION, SOLUTION INTRAVENOUS at 16:50

## 2024-04-02 NOTE — ED PROVIDER NOTES
"Time: 2:40 PM EDT  Date of encounter:  4/2/2024  Independent Historian/Clinical History and Information was obtained by:   Family    History is limited by: nonverbal secondary to stroke    Chief Complaint: Dysuria      History of Present Illness:  Patient is a 73 y.o. year old female who presents to the emergency department for evaluation of dysuria for \" a while.\"  Patient is nonverbal due to prior stroke.  Family members denies any fevers or altered mental status.  States she is a diabetic.    HPI    Patient Care Team  Primary Care Provider: Jaime Quintanilla MD    Past Medical History:     No Known Allergies  Past Medical History:   Diagnosis Date    Advance directive declined by patient 02/26/2020    Aphagia 08/14/2019    Diabetes mellitus, type II     Glaucoma     Hyperlipidemia     Hypertension     Insulin dependent type 2 diabetes mellitus, uncontrolled 02/26/2020    Medication management 02/26/2020     History reviewed. No pertinent surgical history.  Family History   Problem Relation Age of Onset    Other Mother         CVA (Cerebrovascular accident)    Heart disease Mother     Diabetes Father 63       Home Medications:  Prior to Admission medications    Medication Sig Start Date End Date Taking? Authorizing Provider   amLODIPine (NORVASC) 10 MG tablet Take 1 tablet by mouth Daily. 5/9/23   Redd Magdaleno III, MD   aspirin 81 MG EC tablet Take 1 tablet by mouth Daily.    Provider, MD Rosalio   benazepril (LOTENSIN) 40 MG tablet Take 1 tablet by mouth Daily. 11/29/23   Jaime Quintanilla MD   cloNIDine (CATAPRES) 0.1 MG tablet Take 1 tablet by mouth 2 (Two) Times a Day. 5/9/23   Redd Magdaleno III, MD   glucose blood test strip Use as instructed; check blood sugar TID 10/26/23   Jaime Quintanilla MD   hydroCHLOROthiazide (HYDRODIURIL) 25 MG tablet Take 1 tablet by mouth Daily. 5/9/23   Redd Magdaleno III, MD   Insulin Glargine (Lantus SoloStar) 100 UNIT/ML injection pen INJECT 28 UNITS " "UNDER THE SKIN INTO THE APPROPRIATE AREA DAILY AS DIRECTED EVERY NIGHT 12/13/23   Jaime Quintanilla MD   Insulin Lispro, 0.5 Unit Dial, (HUMALOG) 100 UNIT/ML solution pen-injector Inject 3 Units under the skin into the appropriate area as directed 3 (Three) Times a Day Before Meals for 90 days. 10/30/23 1/28/24  Jaime Quintanilla MD   Insulin Pen Needle (Pen Needles) 31G X 8 MM misc 1 Device 4 (Four) Times a Day After Meals & at Bedtime. Diabetes type II uncontrolled 5/9/23   Redd Magdaleno III, MD   latanoprost (XALATAN) 0.005 % ophthalmic solution  1/18/22   Rosalio Padilla MD   metFORMIN ER (GLUCOPHAGE-XR) 500 MG 24 hr tablet Take 2 tablets by mouth Daily With Breakfast. 11/29/23   Jaime Quintanilla MD   omeprazole (priLOSEC) 40 MG capsule Take 1 capsule by mouth Daily. 5/9/23   Redd Magdaleno III, MD   rosuvastatin (CRESTOR) 5 MG tablet TAKE 1 TABLET DAILY 1/15/24   Jaime Quintanilla MD   Semaglutide,0.25 or 0.5MG/DOS, (OZEMPIC) 2 MG/3ML solution pen-injector Inject 0.5 mg under the skin into the appropriate area as directed 1 (One) Time Per Week. 11/6/23   Jaime Quintanilla MD        Social History:   Social History     Tobacco Use    Smoking status: Never    Smokeless tobacco: Never   Vaping Use    Vaping status: Never Used   Substance Use Topics    Alcohol use: Never    Drug use: Never         Review of Systems:  Review of Systems   Reason unable to perform ROS: nonverbal due to stroke.   Constitutional:  Negative for fever.   Genitourinary:  Positive for dysuria. Negative for hematuria.   Psychiatric/Behavioral:  Negative for confusion.         Physical Exam:  /67 (BP Location: Right arm, Patient Position: Sitting)   Pulse 86   Temp 98 °F (36.7 °C) (Oral)   Resp 23   Ht 167.6 cm (66\")   SpO2 (!) 89%   BMI 31.47 kg/m²     Physical Exam  HENT:      Head: Normocephalic.      Mouth/Throat:      Mouth: Mucous membranes are moist.   Eyes:      Pupils: Pupils are " equal, round, and reactive to light.   Pulmonary:      Effort: Pulmonary effort is normal.   Abdominal:      General: There is no distension.   Musculoskeletal:      Cervical back: Neck supple.   Skin:     General: Skin is warm and dry.   Neurological:      General: No focal deficit present.      Mental Status: She is alert and oriented to person, place, and time.   Psychiatric:         Mood and Affect: Mood normal.         Behavior: Behavior normal.                  Procedures:  Procedures      Medical Decision Making:      Comorbidities that affect care:    Hyperlipidemia, hypertension, diabetes    External Notes reviewed:          The following orders were placed and all results were independently analyzed by me:  Orders Placed This Encounter   Procedures    Urine Culture - Urine,    [place] Draw    Comprehensive Metabolic Panel    Urinalysis With Microscopic If Indicated (No Culture) - Urine, Clean Catch    CBC Auto Differential    Acetone    VBG with Co-Ox and Electrolytes    Urinalysis, Microscopic Only - Urine, Clean Catch    NPO Diet NPO Type: Strict NPO    Undress & Gown    Inpatient Hospitalist Consult    Insert Peripheral IV    Initiate Observation Status    CBC & Differential    Green Top (Gel)    Lavender Top    Gold Top - SST    Light Blue Top       Medications Given in the Emergency Department:  Medications   sodium chloride 0.9 % flush 10 mL (has no administration in time range)   sodium chloride 0.9 % bolus 2,000 mL (2,000 mL Intravenous New Bag 4/2/24 1650)   insulin regular (humuLIN R,novoLIN R) injection 7 Units (7 Units Intravenous Given 4/2/24 1651)   cefTRIAXone (ROCEPHIN) 1000 mg/100 mL 0.9% NS (MBP) (1,000 mg Intravenous New Bag 4/2/24 1725)        ED Course:    ED Course as of 04/02/24 1755   Tue Apr 02, 2024   1441   --- PROVIDER IN TRIAGE NOTE ---    The patient was evaluated by me, Dandre Mccarty in triage. Orders were placed and the patient is currently awaiting disposition.    [AJ]       ED Course User Index  [AJ] Dandre Mccarty PA-C       Labs:    Lab Results (last 24 hours)       Procedure Component Value Units Date/Time    CBC & Differential [[id number]]  (Abnormal) Collected: 04/02/24 1547    Specimen: Blood Updated: 04/02/24 1555    Narrative:      The following orders were created for panel order CBC & Differential.  Procedure                               Abnormality         Status                     ---------                               -----------         ------                     CBC Auto Differential[[id number]]        Abnormal            Final result                 Please view results for these tests on the individual orders.    Comprehensive Metabolic Panel [[id number]]  (Abnormal) Collected: 04/02/24 1547    Specimen: Blood Updated: 04/02/24 1627     Glucose 405 mg/dL      BUN 37 mg/dL      Creatinine 3.19 mg/dL      Sodium 134 mmol/L      Potassium 4.8 mmol/L      Chloride 95 mmol/L      CO2 22.5 mmol/L      Calcium 9.9 mg/dL      Total Protein 8.7 g/dL      Albumin 4.4 g/dL      ALT (SGPT) 14 U/L      AST (SGOT) 18 U/L      Alkaline Phosphatase 144 U/L      Total Bilirubin 0.8 mg/dL      Globulin 4.3 gm/dL      A/G Ratio 1.0 g/dL      BUN/Creatinine Ratio 11.6     Anion Gap 16.5 mmol/L      eGFR 14.8 mL/min/1.73      Comment: <15 Indicative of kidney failure       Narrative:      GFR Normal >60  Chronic Kidney Disease <60  Kidney Failure <15    The GFR formula is only valid for adults with stable renal function between ages 18 and 70.    CBC Auto Differential [[id number]]  (Abnormal) Collected: 04/02/24 1547    Specimen: Blood Updated: 04/02/24 1555     WBC 10.86 10*3/mm3      RBC 5.61 10*6/mm3      Hemoglobin 15.4 g/dL      Hematocrit 47.6 %      MCV 84.8 fL      MCH 27.5 pg      MCHC 32.4 g/dL      RDW 15.4 %      RDW-SD 47.5 fl      MPV 12.1 fL      Platelets 321 10*3/mm3      Neutrophil % 71.8 %      Lymphocyte % 18.7 %      Monocyte % 8.3 %      Eosinophil % 0.1 %       Basophil % 0.7 %      Immature Grans % 0.4 %      Neutrophils, Absolute 7.80 10*3/mm3      Lymphocytes, Absolute 2.03 10*3/mm3      Monocytes, Absolute 0.90 10*3/mm3      Eosinophils, Absolute 0.01 10*3/mm3      Basophils, Absolute 0.08 10*3/mm3      Immature Grans, Absolute 0.04 10*3/mm3      nRBC 0.0 /100 WBC     Acetone [[id number]]  (Normal) Collected: 04/02/24 1547    Specimen: Blood Updated: 04/02/24 1635     Acetone Negative    Urinalysis With Microscopic If Indicated (No Culture) - Urine, Clean Catch [[id number]]  (Abnormal) Collected: 04/02/24 1622    Specimen: Urine, Clean Catch Updated: 04/02/24 1645     Color, UA Dark Yellow     Appearance, UA Turbid     pH, UA 5.5     Specific Gravity, UA 1.022     Glucose,  mg/dL (Trace)     Ketones, UA 15 mg/dL (1+)     Bilirubin, UA Negative     Blood, UA Moderate (2+)     Protein, UA >=300 mg/dL (3+)     Leuk Esterase, UA Large (3+)     Nitrite, UA Negative     Urobilinogen, UA 1.0 E.U./dL    Urinalysis, Microscopic Only - Urine, Clean Catch [[id number]]  (Abnormal) Collected: 04/02/24 1622    Specimen: Urine, Clean Catch Updated: 04/02/24 1705     RBC, UA None Seen /HPF      WBC, UA Too Numerous to Count /HPF      Bacteria, UA 3+ /HPF      Squamous Epithelial Cells, UA 0-2 /HPF      Hyaline Casts, UA None Seen /LPF      Methodology Manual Light Microscopy    Urine Culture - Urine, Urine, Clean Catch [[id number]] Collected: 04/02/24 1622    Specimen: Urine, Clean Catch Updated: 04/02/24 1710    VBG with Co-Ox and Electrolytes [[id number]]  (Abnormal) Collected: 04/02/24 1644    Specimen: Venous Blood Updated: 04/02/24 1648     pH, Venous 7.391 pH Units      pCO2, Venous 37.0 mm Hg      pO2, Venous <40.5 mm Hg      HCO3, Venous 21.9 mmol/L      Base Excess, Venous -2.4 mmol/L      O2 Saturation, Venous 55.0 %      Hemoglobin, Blood Gas 15.8 g/dL      Carboxyhemoglobin 1.6 %      Methemoglobin 0.20 %      Oxyhemoglobin 54.0 %      FHHB 44.2 %      Note --      Site Drawn by RN     Modality RA     FIO2 21 %      Flow Rate 0 lpm      Sodium, Venous 137.5 mmol/L      Potassium, Venous 4.9 mmol/L      Ionized Calcium, Arterial 1.18 mmol/L      Chloride, Venous  99 mmol/L      Glucose, Arterial 399 mg/dL      Lactate, Arterial 1.95 mmol/L              Imaging:    No Radiology Exams Resulted Within Past 24 Hours      Differential Diagnosis and Discussion:    Dysuria: Differential diagnosis includes but is not limited to urethritis, cystitis, pyelonephritis, ureteral calculi, neoplasm, chemical irritant, urethral stricture, and trauma  Metabolic: Differential diagnosis includes but is not limited to hypertension, hyperglycemia, hyperkalemia, hypocalcemia, metabolic acidosis, hypokalemia, hypoglycemia, malnutrition, hypothyroidism, hyperthyroidism, and adrenal insufficiency.     All labs were reviewed and interpreted by me.    MDM     Amount and/or Complexity of Data Reviewed  Clinical lab tests: reviewed  Decide to obtain previous medical records or to obtain history from someone other than the patient: yes       Patient's kidney function shows evidence of TORSTEN.  This is severely changed from months ago.  I gave the patient 2 L of fluids and 7 units of insulin for elevated blood sugar.  Patient was acetone negative and not acidotic.  Gave the patient a gram Rocephin in the ED for the UTI.  I spoke with hospitalist Dr. Jenkins who agrees to admit the patient for TORSTEN and UTI.          Patient Care Considerations:          Consultants/Shared Management Plan:    I spoke with hospitalist Dr. Jenkins who agrees to admit the patient for TORSTEN and UTI.    Social Determinants of Health:          Disposition and Care Coordination:    Admit:   Through independent evaluation of the patient's history, physical, and imperical data, the patient meets criteria for inpatient admission to the hospital.        Final diagnoses:   TORSTEN (acute kidney injury)   Acute UTI        ED Disposition       ED  Disposition   Decision to Admit    Condition   --    Comment   Level of Care: Remote Telemetry [26]   Diagnosis: TORSTEN (acute kidney injury) [[id number]]   Admitting Physician: MARGARETH FRIEDMAN [[id number]]                 This medical record created using voice recognition software.             Reynold Zurita PA-C  04/02/24 1751

## 2024-04-02 NOTE — ED NOTES
Patient was straight cathed for a urine sample. Patient has underwear that is stained with stool and a pad that is covered in stool and saturated in urine. Patient has raw skin spots in between her folds and very poor perineal hygiene. Patient urine was very cloudy and thick. Patients family states that she lives at home by herself and isnt taking care of herself but will refuse home health to help her.

## 2024-04-03 LAB
ANION GAP SERPL CALCULATED.3IONS-SCNC: 10.5 MMOL/L (ref 5–15)
BUN SERPL-MCNC: 29 MG/DL (ref 8–23)
BUN/CREAT SERPL: 18.4 (ref 7–25)
CALCIUM SPEC-SCNC: 9.1 MG/DL (ref 8.6–10.5)
CHLORIDE SERPL-SCNC: 109 MMOL/L (ref 98–107)
CO2 SERPL-SCNC: 20.5 MMOL/L (ref 22–29)
CREAT SERPL-MCNC: 1.58 MG/DL (ref 0.57–1)
DEPRECATED RDW RBC AUTO: 47.6 FL (ref 37–54)
EGFRCR SERPLBLD CKD-EPI 2021: 34.4 ML/MIN/1.73
ERYTHROCYTE [DISTWIDTH] IN BLOOD BY AUTOMATED COUNT: 15.2 % (ref 12.3–15.4)
GLUCOSE BLDC GLUCOMTR-MCNC: 121 MG/DL (ref 70–99)
GLUCOSE BLDC GLUCOMTR-MCNC: 189 MG/DL (ref 70–99)
GLUCOSE BLDC GLUCOMTR-MCNC: 198 MG/DL (ref 70–99)
GLUCOSE BLDC GLUCOMTR-MCNC: 263 MG/DL (ref 70–99)
GLUCOSE SERPL-MCNC: 151 MG/DL (ref 65–99)
HBA1C MFR BLD: 9.2 % (ref 4.8–5.6)
HCT VFR BLD AUTO: 41.4 % (ref 34–46.6)
HGB BLD-MCNC: 13.2 G/DL (ref 12–15.9)
MCH RBC QN AUTO: 27.2 PG (ref 26.6–33)
MCHC RBC AUTO-ENTMCNC: 31.9 G/DL (ref 31.5–35.7)
MCV RBC AUTO: 85.2 FL (ref 79–97)
PLATELET # BLD AUTO: 263 10*3/MM3 (ref 140–450)
PMV BLD AUTO: 12.3 FL (ref 6–12)
POTASSIUM SERPL-SCNC: 4 MMOL/L (ref 3.5–5.2)
RBC # BLD AUTO: 4.86 10*6/MM3 (ref 3.77–5.28)
SODIUM SERPL-SCNC: 140 MMOL/L (ref 136–145)
WBC NRBC COR # BLD AUTO: 10.83 10*3/MM3 (ref 3.4–10.8)

## 2024-04-03 PROCEDURE — 25810000003 LACTATED RINGERS PER 1000 ML: Performed by: FAMILY MEDICINE

## 2024-04-03 PROCEDURE — 63710000001 INSULIN DETEMIR PER 5 UNITS: Performed by: FAMILY MEDICINE

## 2024-04-03 PROCEDURE — 99232 SBSQ HOSP IP/OBS MODERATE 35: CPT | Performed by: INTERNAL MEDICINE

## 2024-04-03 PROCEDURE — 63710000001 INSULIN LISPRO (HUMAN) PER 5 UNITS: Performed by: FAMILY MEDICINE

## 2024-04-03 PROCEDURE — 25010000002 HEPARIN (PORCINE) PER 1000 UNITS: Performed by: FAMILY MEDICINE

## 2024-04-03 PROCEDURE — 97165 OT EVAL LOW COMPLEX 30 MIN: CPT

## 2024-04-03 PROCEDURE — 85027 COMPLETE CBC AUTOMATED: CPT | Performed by: FAMILY MEDICINE

## 2024-04-03 PROCEDURE — 82948 REAGENT STRIP/BLOOD GLUCOSE: CPT

## 2024-04-03 PROCEDURE — 25010000002 CEFTRIAXONE PER 250 MG: Performed by: FAMILY MEDICINE

## 2024-04-03 PROCEDURE — 97161 PT EVAL LOW COMPLEX 20 MIN: CPT

## 2024-04-03 PROCEDURE — 80048 BASIC METABOLIC PNL TOTAL CA: CPT | Performed by: FAMILY MEDICINE

## 2024-04-03 RX ORDER — LATANOPROST 50 UG/ML
1 SOLUTION/ DROPS OPHTHALMIC DAILY
Status: DISCONTINUED | OUTPATIENT
Start: 2024-04-03 | End: 2024-04-04 | Stop reason: HOSPADM

## 2024-04-03 RX ADMIN — PANTOPRAZOLE SODIUM 40 MG: 40 TABLET, DELAYED RELEASE ORAL at 05:02

## 2024-04-03 RX ADMIN — SODIUM CHLORIDE, POTASSIUM CHLORIDE, SODIUM LACTATE AND CALCIUM CHLORIDE 125 ML/HR: 600; 310; 30; 20 INJECTION, SOLUTION INTRAVENOUS at 05:00

## 2024-04-03 RX ADMIN — INSULIN LISPRO 5 UNITS: 100 INJECTION, SOLUTION INTRAVENOUS; SUBCUTANEOUS at 20:23

## 2024-04-03 RX ADMIN — CLONIDINE HYDROCHLORIDE 0.1 MG: 0.1 TABLET ORAL at 20:23

## 2024-04-03 RX ADMIN — CLONIDINE HYDROCHLORIDE 0.1 MG: 0.1 TABLET ORAL at 08:25

## 2024-04-03 RX ADMIN — Medication 250 MG: at 20:23

## 2024-04-03 RX ADMIN — Medication 10 ML: at 08:25

## 2024-04-03 RX ADMIN — CEFTRIAXONE SODIUM 1000 MG: 1 INJECTION, POWDER, FOR SOLUTION INTRAMUSCULAR; INTRAVENOUS at 17:29

## 2024-04-03 RX ADMIN — ASPIRIN 81 MG: 81 TABLET, COATED ORAL at 08:23

## 2024-04-03 RX ADMIN — LATANOPROST 1 DROP: 50 SOLUTION OPHTHALMIC at 14:06

## 2024-04-03 RX ADMIN — AMLODIPINE BESYLATE 10 MG: 10 TABLET ORAL at 08:24

## 2024-04-03 RX ADMIN — INSULIN DETEMIR 20 UNITS: 100 INJECTION, SOLUTION SUBCUTANEOUS at 20:23

## 2024-04-03 RX ADMIN — INSULIN LISPRO 3 UNITS: 100 INJECTION, SOLUTION INTRAVENOUS; SUBCUTANEOUS at 17:42

## 2024-04-03 RX ADMIN — HEPARIN SODIUM 5000 UNITS: 5000 INJECTION INTRAVENOUS; SUBCUTANEOUS at 20:23

## 2024-04-03 RX ADMIN — Medication 10 ML: at 20:24

## 2024-04-03 RX ADMIN — Medication 250 MG: at 08:24

## 2024-04-03 RX ADMIN — HEPARIN SODIUM 5000 UNITS: 5000 INJECTION INTRAVENOUS; SUBCUTANEOUS at 08:23

## 2024-04-03 RX ADMIN — INSULIN LISPRO 3 UNITS: 100 INJECTION, SOLUTION INTRAVENOUS; SUBCUTANEOUS at 12:35

## 2024-04-03 RX ADMIN — ROSUVASTATIN CALCIUM 5 MG: 5 TABLET, FILM COATED ORAL at 20:23

## 2024-04-03 RX ADMIN — SODIUM CHLORIDE, POTASSIUM CHLORIDE, SODIUM LACTATE AND CALCIUM CHLORIDE 125 ML/HR: 600; 310; 30; 20 INJECTION, SOLUTION INTRAVENOUS at 12:41

## 2024-04-03 NOTE — PROGRESS NOTES
"Patient Care Team:  Jaime Quintanilla MD as PCP - General (Internal Medicine)  Sachi Reynolds, GENEVIEVE as Ambulatory  ([facility])    Date: 4/3/2024  Patient Name: Charis Hill  : 1950  MRN: [id number]  Date of admission: 2024  Room/Bed: [facility]      Subjective   Subjective         Chief Complaint: f/u weakness, dysuria    Summary:Charis Hill is a 73 y.o. 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  Patient is nonverbal d/t prior stroke and history was taken via chart review.  Patient was brought in by son with complaints of dysuria, intermittent confusion, weakness, and uncontrolled blood glucose.  In the ED patient's vitals were all within normal meds on arrival labs showed that she had leukocytosis with UA showing significant UTI.  She also had significant hyperglycemia with significantly reduced renal function from baseline.  Patient was admitted for further evaluation and treatment       Interval Followup:   Patient is lying in bed, is able to say some words. Son at bedside states patient can usually get from bed to chair and is able to perform her ADLs. Patient mostly texts with family and was able to text her son that she was feeling weak yesterday. Patient having some difficulty with typing today so I have ask nursing to get her a communication board.       Review of Systems   Unable to perform ROS: Patient nonverbal       Objective   Objective       Vital Signs  Temp:  [97.7 °F (36.5 °C)-98.6 °F (37 °C)] 97.7 °F (36.5 °C)  Heart Rate:  [] 78  Resp:  [12-23] 16  BP: (111-140)/(44-72) 140/70  Oxygen Therapy  SpO2: 97 %  Pulse Oximetry Type: Intermittent  Device (Oxygen Therapy): nasal cannula  Flow (L/min): 2  Flowsheet Rows      Flowsheet Row First Filed Value   Admission Height 167.6 cm (66\") Documented at 2024 1440   Admission Weight 78 kg (171 lb 15.3 oz) " Documented at 04/02/2024 2001          Intake & Output (last 3 days)         03/31 0701  04/01 0700 04/01 0701 04/02 0700 04/02 0701 04/03 0700 04/03 0701 04/04 0700    Urine (mL/kg/hr)   925 200 (0.5)    Total Output   925 200    Net   -925 -200            Urine Unmeasured Occurrence   2 x           Lines, Drains & Airways       Active LDAs       Name Placement date Placement time Site Days    Peripheral IV 04/02/24 1548 Right Antecubital 04/02/24  1548  Antecubital  less than 1                      Physical Exam  Vitals reviewed.   Constitutional:       General: She is not in acute distress.     Appearance: She is normal weight.   HENT:      Head: Normocephalic and atraumatic.      Mouth/Throat:      Mouth: Mucous membranes are moist.   Cardiovascular:      Rate and Rhythm: Normal rate and regular rhythm.   Pulmonary:      Effort: Pulmonary effort is normal. No respiratory distress.      Breath sounds: No wheezing.   Abdominal:      General: Bowel sounds are normal. There is no distension.      Palpations: Abdomen is soft.      Tenderness: There is no abdominal tenderness.   Musculoskeletal:      Right lower leg: No edema.      Left lower leg: No edema.   Skin:     General: Skin is warm and dry.      Findings: No rash.   Neurological:      Mental Status: She is alert.           Results Review:      Results from last 7 days   Lab Units 04/03/24  0838 04/02/24  1547   WBC 10*3/mm3 10.83* 10.86*   HEMOGLOBIN g/dL 13.2 15.4   HEMATOCRIT % 41.4 47.6*   PLATELETS 10*3/mm3 263 321     Results from last 7 days   Lab Units 04/03/24  0633 04/02/24  1644 04/02/24  1547   SODIUM mmol/L 140  --  134*   SODIUM, VENOUS mmol/L  --  137.5  --    POTASSIUM mmol/L 4.0  --  4.8   POTASSIUM, VENOUS mmol/L  --  4.9  --    CHLORIDE mmol/L 109*  --  95*   CHLORIDE, VENOUS mmol/L  --  99  --    CO2 mmol/L 20.5*  --  22.5   BUN mg/dL 29*  --  37*   CREATININE mg/dL 1.58*  --  3.19*   CALCIUM mg/dL 9.1  --  9.9   BILIRUBIN mg/dL  --   --  " 0.8   ALK PHOS U/L  --   --  144*   ALT (SGPT) U/L  --   --  14   AST (SGOT) U/L  --   --  18   GLUCOSE mg/dL 151*  --  405*   GLUCOSE, ARTERIAL mg/dL  --  399*  --              No results found for: \"BLOODCX\"    No results found for: \"MRSACX\"    No results found for: \"STOOLCX\"    Urine Culture   Date Value Ref Range Status   04/02/2024 >100,000 CFU/mL Gram Negative Bacilli (A)  Preliminary       No results found for: \"WOUNDCX\"    Imaging Results (Last 24 Hours)       ** No results found for the last 24 hours. **            I have personally reviewed the patient's new imaging and lab results.          ECG/EMG Results (most recent)       None                    Medication Review:     Scheduled Meds:amLODIPine, 10 mg, Oral, Daily  aspirin, 81 mg, Oral, Daily  cefTRIAXone, 1,000 mg, Intravenous, Q24H  cloNIDine, 0.1 mg, Oral, BID  heparin (porcine), 5,000 Units, Subcutaneous, Q12H  insulin detemir, 20 Units, Subcutaneous, Nightly  insulin lispro, 3-14 Units, Subcutaneous, 4x Daily AC & at Bedtime  pantoprazole, 40 mg, Oral, Q AM  rosuvastatin, 5 mg, Oral, Nightly  saccharomyces boulardii, 250 mg, Oral, BID  sodium chloride, 10 mL, Intravenous, Q12H      Continuous Infusions:lactated ringers, 125 mL/hr, Last Rate: 125 mL/hr (04/03/24 0500)      PRN Meds:.  senna-docusate sodium **AND** polyethylene glycol **AND** bisacodyl **AND** bisacodyl    dextrose    dextrose    glucagon (human recombinant)    ondansetron    sodium chloride    sodium chloride    sodium chloride      I have reviewed the patient's current medication list    Assessment & Plan   Assessment / Plan       Active Hospital Problems    Diagnosis  POA    **TORSTEN (acute kidney injury) [N17.9]  Yes    Acute UTI [N39.0]  Unknown    Stroke [I63.9]  Yes    Speech disturbance [R47.9]  Yes    Hyperlipidemia [E78.5]  Yes    Hypertension [I10]  Yes    Diabetes mellitus, type II, insulin dependent [E11.9, Z79.4]  Not Applicable       TORSTEN  - Patient with UTI, " uncontrolled glucose.  Likely dehydrated  - continue IVF  - Avoid nephrotoxic drugs  - review  of records shows baseline Cr 1.1-1.2  - Cr trending down   - monitor with routine labs     Acute UTI d/t GNR- POA   - Patient with dysuria and lethargy/AMS  - UA reviewed  - continue IV Rocephin   - Blood cx- pending   - continue IVF     Uncontrolled type 2 diabetes with hyperglycemia   - A1c 9.2   - holding metformin and Ozempic   - Levemir 20 units at bedtime  - High-dose insulin scale  - monitor with routine accu-checks and make adjustments as needed     HTN  - holding hctz, benazepril  - continue amlodipine, clonidine  - monitor with routine vs and make adjustments as needed      History of CVA  Nonverbal at baseline   - continue aspirin, statin     GERD  - continue ppi     DVT Prophylaxis  - heparin     Code Status  - pending    Plan for disposition: pending progress     Discussed with nursing, patient's son at bedside, and case management. Will consult PT/OT    Kate Shannon DO  04/03/24  12:31 EDT        Note disclaimer: At [facility], we believe that sharing information builds trust and better relationships. You are receiving this note because you recently visited [facility]. It is possible you will see health information before a provider has talked with you about it. This kind of information can be easy to misunderstand. To help you fully understand what it means for your health, we urge you to discuss this note with your provider.

## 2024-04-03 NOTE — PLAN OF CARE
Goal Outcome Evaluation:  Plan of Care Reviewed With: patient        Progress: no change (first session: evaluation)  Outcome Evaluation: Patient presents with limitations of balance and endurance/ activity tolerance which are impacting ADL/ transfer independence. Skilled OT is indicated to remediate/ compensate for deficits to maximize independence and safety with functional tasks.      Anticipated Discharge Disposition (OT): inpatient rehabilitation facility, sub acute care setting

## 2024-04-03 NOTE — THERAPY EVALUATION
Acute Care - Physical Therapy Initial Evaluation   Bhavana     Patient Name: Charis Hill  : 1950  MRN: [id number]  Today's Date: 4/3/2024      Visit Dx:     ICD-10-CM ICD-9-CM   1. TORSTEN (acute kidney injury)  N17.9 584.9   2. Acute UTI  N39.0 599.0   3. Decreased activities of daily living (ADL)  Z78.9 V49.89   4. Difficulty walking  R26.2 719.7     Patient Active Problem List   Diagnosis    Diabetes mellitus, type II, insulin dependent    Hypertension    Hyperlipidemia    Advance directive declined by patient    Speech disturbance    Stroke    TORSTEN (acute kidney injury)    Acute UTI     Past Medical History:   Diagnosis Date    Advance directive declined by patient 2020    Aphagia 2019    Diabetes mellitus, type II     Glaucoma     Hyperlipidemia     Hypertension     Insulin dependent type 2 diabetes mellitus, uncontrolled 2020    Medication management 2020     History reviewed. No pertinent surgical history.  PT Assessment (Last 12 Hours)       PT Evaluation and Treatment       Row Name 24 1300          Physical Therapy Time and Intention    Subjective Information no complaints  -CS     Document Type evaluation  -CS     Mode of Treatment individual therapy;physical therapy  -CS     Patient Effort fair  -CS     Symptoms Noted During/After Treatment fatigue  -CS       Row Name 24 1300          General Information    Patient Profile Reviewed yes  -CS     Patient Observations alert;cooperative;agree to therapy  -CS     Prior Level of Function independent:;all household mobility;gait;transfer;bed mobility;ADL's  -CS     Equipment Currently Used at Home walker, rolling  no home O2 used; Unsure on accuracy of PLOF as patient has difficulty answering questions  -CS     Existing Precautions/Restrictions fall;oxygen therapy device and L/min  -CS     Limitations/Impairments aphasia;safety/cognitive  -CS     Barriers to Rehab none identified  -CS       Row Name 24 1300           Living Environment    Current Living Arrangements home  -CS     Home Accessibility stairs to enter home;stairs within home  -CS     People in Home alone  -CS     Primary Care Provided by self  -CS       Row Name 04/03/24 1300          Home Main Entrance    Number of Stairs, Main Entrance none  -CS       Row Name 04/03/24 1300          Stairs Within Home, Primary    Number of Stairs, Within Home, Primary none  -CS       Row Name 04/03/24 1300          Pain    Pretreatment Pain Rating 0/10 - no pain  -CS     Posttreatment Pain Rating 0/10 - no pain  -CS       Row Name 04/03/24 1300          Cognition    Affect/Mental Status (Cognition) low arousal/lethargic  -CS     Orientation Status (Cognition) oriented to;person;place  -CS     Follows Commands (Cognition) delayed response/completion;increased processing time needed  pt slow to respond and has aphasia from previous stroke; does better with yes and no questions  -       Row Name 04/03/24 1300          Range of Motion Comprehensive    General Range of Motion bilateral lower extremity ROM WFL  with AAROM  -       Row Name 04/03/24 1300          Strength Comprehensive (MMT)    General Manual Muscle Testing (MMT) Assessment lower extremity strength deficits identified  -CS     Comment, General Manual Muscle Testing (MMT) Assessment BLEs assessed at 3/5  -       Row Name 04/03/24 1300          Bed Mobility    Bed Mobility supine-sit-supine  -CS     Supine-Sit-Supine Ray (Bed Mobility) verbal cues;contact guard;minimum assist (75% patient effort)  -     Bed Mobility, Safety Issues decreased use of arms for pushing/pulling;decreased use of legs for bridging/pushing  -     Assistive Device (Bed Mobility) bed rails;head of bed elevated  -       Row Name 04/03/24 1300          Transfers    Transfers sit-stand transfer  -       Row Name 04/03/24 1300          Sit-Stand Transfer    Sit-Stand Ray (Transfers) verbal cues;minimum assist (75%  patient effort)  -CS     Assistive Device (Sit-Stand Transfers) walker, front-wheeled  -CS       Row Name 04/03/24 1300          Gait/Stairs (Locomotion)    Gait/Stairs Locomotion gait/ambulation assistive device  -CS     [place] Level (Gait) verbal cues;contact guard;minimum assist (75% patient effort)  -CS     Assistive Device (Gait) walker, front-wheeled  -CS     Patient was able to Ambulate yes  -CS     Distance in Feet (Gait) 5  -CS     Pattern (Gait) step-through  -CS     Deviations/Abnormal Patterns (Gait) stride length decreased;gait speed decreased  -CS       Row Name 04/03/24 1300          Safety Issues, Functional Mobility    Impairments Affecting Function (Mobility) balance;cognition;endurance/activity tolerance;strength;pain  -CS       Row Name 04/03/24 1300          Balance    Balance Assessment standing dynamic balance  -     Dynamic Standing Balance minimal assist  -CS     Position/Device Used, Standing Balance supported;walker, front-wheeled  -CS       Row Name             Wound 04/02/24 1945 Left anterior hip    Wound - Properties Group Placement Date: 04/02/24  -LR Placement Time: 1945  -LR Present on Original Admission: Y  -LR Side: Left  -LR Orientation: anterior  -LR Location: hip  -LR    Retired Wound - Properties Group Placement Date: 04/02/24  -LR Placement Time: 1945  -LR Present on Original Admission: Y  -LR Side: Left  -LR Orientation: anterior  -LR Location: hip  -LR    Retired Wound - Properties Group Date first assessed: 04/02/24  -LR Time first assessed: 1945  -LR Present on Original Admission: Y  -LR Side: Left  -LR Location: hip  -LR      Row Name             Wound 04/02/24 1945 Right gluteal Pressure Injury    Wound - Properties Group Placement Date: 04/02/24  -LR Placement Time: 1945  -LR Present on Original Admission: Y  -LR Side: Right  -LR Location: gluteal  -LR Primary Wound Type: Pressure inj  -LR    Retired Wound - Properties Group Placement Date: 04/02/24  -LR  Placement Time: 1945  -LR Present on Original Admission: Y  -LR Side: Right  -LR Location: gluteal  -LR Primary Wound Type: Pressure inj  -LR    Retired Wound - Properties Group Date first assessed: 04/02/24  -LR Time first assessed: 1945  -LR Present on Original Admission: Y  -LR Side: Right  -LR Location: gluteal  -LR Primary Wound Type: Pressure inj  -LR      Row Name             Wound 04/02/24 1945 Right head    Wound - Properties Group Placement Date: 04/02/24  -LR Placement Time: 1945  -LR Present on Original Admission: Y  -LR Side: Right  -LR Location: head  -LR    Retired Wound - Properties Group Placement Date: 04/02/24  -LR Placement Time: 1945  -LR Present on Original Admission: Y  -LR Side: Right  -LR Location: head  -LR    Retired Wound - Properties Group Date first assessed: 04/02/24  -LR Time first assessed: 1945  -LR Present on Original Admission: Y  -LR Side: Right  -LR Location: head  -LR      Row Name             Wound 04/02/24 1945 Right anterior groin    Wound - Properties Group Placement Date: 04/02/24  -LR Placement Time: 1945  -LR Present on Original Admission: Y  -LR Side: Right  -LR Orientation: anterior  -LR Location: groin  -LR    Retired Wound - Properties Group Placement Date: 04/02/24  -LR Placement Time: 1945  -LR Present on Original Admission: Y  -LR Side: Right  -LR Orientation: anterior  -LR Location: groin  -LR    Retired Wound - Properties Group Date first assessed: 04/02/24  -LR Time first assessed: 1945  -LR Present on Original Admission: Y  -LR Side: Right  -LR Location: groin  -LR      Row Name             Wound 04/02/24 1945 umbilical area    Wound - Properties Group Placement Date: 04/02/24  -LR Placement Time: 1945  -LR Present on Original Admission: Y  -LR Location: umbilical area  -LR    Retired Wound - Properties Group Placement Date: 04/02/24  -LR Placement Time: 1945  -LR Present on Original Admission: Y  -LR Location: umbilical area  -LR    Retired Wound - Properties  Group Date first assessed: 04/02/24  -LR Time first assessed: 1945  -LR Present on Original Admission: Y  -LR Location: umbilical area  -LR      Row Name 04/03/24 1300          Plan of Care Review    Plan of Care Reviewed With patient  -CS     Progress no change  -CS     Outcome Evaluation Pt presents with limitations that impede their ability to safely and independently transfer and ambulate. The skills of a therapist will be required to safely and effectively implement the following treatment plan to restore maximal level of function.  -CS       Row Name 04/03/24 1300          Positioning and Restraints    Pre-Treatment Position in bed  -CS     Post Treatment Position bed  -CS     In Bed supine;call light within reach;encouraged to call for assist;exit alarm on  -CS       Row Name 04/03/24 1300          Therapy Assessment/Plan (PT)    Rehab Potential (PT) good, to achieve stated therapy goals  -CS     Criteria for Skilled Interventions Met (PT) yes;skilled treatment is necessary  -CS     Therapy Frequency (PT) daily  -CS     Predicted Duration of Therapy Intervention (PT) 10 days  -CS     Problem List (PT) problems related to;balance;mobility;strength;pain  -CS     Activity Limitations Related to Problem List (PT) unable to transfer safely;unable to ambulate safely  -CS       Row Name 04/03/24 1300          PT Evaluation Complexity    History, PT Evaluation Complexity 1-2 personal factors and/or comorbidities  -CS     Examination of Body Systems (PT Eval Complexity) total of 4 or more elements  -CS     Clinical Presentation (PT Evaluation Complexity) stable  -CS     Clinical Decision Making (PT Evaluation Complexity) low complexity  -CS     Overall Complexity (PT Evaluation Complexity) low complexity  -CS       Row Name 04/03/24 1300          Therapy Plan Review/Discharge Plan (PT)    Therapy Plan Review (PT) evaluation/treatment results reviewed;patient  -CS       Row Name 04/03/24 1300          Physical Therapy  Goals    Bed Mobility Goal Selection (PT) bed mobility, PT goal 1  -CS     Transfer Goal Selection (PT) transfer, PT goal 1  -CS     Gait Training Goal Selection (PT) gait training, PT goal 1  -CS       Row Name 04/03/24 1300          Bed Mobility Goal 1 (PT)    Activity/Assistive Device (Bed Mobility Goal 1, PT) bed mobility activities, all  -CS     [place] Level/Cues Needed (Bed Mobility Goal 1, PT) modified independence  -CS     Time Frame (Bed Mobility Goal 1, PT) long term goal (LTG);10 days  -CS       Row Name 04/03/24 1300          Transfer Goal 1 (PT)    Activity/Assistive Device (Transfer Goal 1, PT) sit-to-stand/stand-to-sit;bed-to-chair/chair-to-bed;walker, rolling  -CS     [place] Level/Cues Needed (Transfer Goal 1, PT) supervision required  -CS     Time Frame (Transfer Goal 1, PT) long term goal (LTG);10 days  -CS       Row Name 04/03/24 1300          Gait Training Goal 1 (PT)    Activity/Assistive Device (Gait Training Goal 1, PT) gait (walking locomotion);assistive device use;walker, rolling  -CS     [place] Level (Gait Training Goal 1, PT) supervision required  -CS     Distance (Gait Training Goal 1, PT) 200  -CS     Time Frame (Gait Training Goal 1, PT) long term goal (LTG);10 days  -CS               User Key  (r) = Recorded By, (t) = Taken By, (c) = Cosigned By      Initials Name Provider Type    CS Pippa Ron, PT Physical Therapist    Rigoberto Rodriguez RN Registered Nurse                      PT Recommendation and Plan  Anticipated Discharge Disposition (PT): inpatient rehabilitation facility  Planned Therapy Interventions (PT): balance training, bed mobility training, gait training, transfer training, strengthening  Therapy Frequency (PT): daily  Plan of Care Reviewed With: patient  Progress: no change  Outcome Evaluation: Pt presents with limitations that impede their ability to safely and independently transfer and ambulate. The skills of a therapist will be required to  safely and effectively implement the following treatment plan to restore maximal level of function.   Outcome Measures       Row Name 04/03/24 1300             How much help from another person do you currently need...    Turning from your back to your side while in flat bed without using bedrails? 2  -CS      Moving from lying on back to sitting on the side of a flat bed without bedrails? 2  -CS      Moving to and from a bed to a chair (including a wheelchair)? 3  -CS      Standing up from a chair using your arms (e.g., wheelchair, bedside chair)? 3  -CS      Climbing 3-5 steps with a railing? 2  -CS      To walk in hospital room? 3  -CS      AM-PAC 6 Clicks Score (PT) 15  -CS      Highest Level of Mobility Goal 4 --> Transfer to chair/commode  -CS         Functional Assessment    Outcome Measure Options AM-PAC 6 Clicks Basic Mobility (PT)  -CS                User Key  (r) = Recorded By, (t) = Taken By, (c) = Cosigned By      Initials Name Provider Type    Pippa Stewart, PT Physical Therapist                     Time Calculation:    PT Charges       Row Name 04/03/24 1338             Time Calculation    PT Received On 04/03/24  -CS      PT Goal Re-Cert Due Date 04/12/24  -CS         Untimed Charges    PT Eval/Re-eval Minutes 35  -CS         Total Minutes    Untimed Charges Total Minutes 35  -CS       Total Minutes 35  -CS                User Key  (r) = Recorded By, (t) = Taken By, (c) = Cosigned By      Initials Name Provider Type    Pippa Stewart, PT Physical Therapist                  Therapy Charges for Today       Code Description Service Date Service Provider Modifiers Qty    [id number] HC PT EVAL LOW COMPLEXITY 3 4/3/2024 Pippa Ron PT GP 1            PT G-Codes  Outcome Measure Options: AM-PAC 6 Clicks Basic Mobility (PT)  AM-PAC 6 Clicks Score (PT): 15  AM-PAC 6 Clicks Score (OT): 19    Pippa Ron PT  4/3/2024

## 2024-04-03 NOTE — PLAN OF CARE
Goal Outcome Evaluation:         Pt alert nonverbal, besides yes/no, slow to respond, uses phone to type out communication. LR running at 125ml/hr. Pt used bedpan and bsc multiple times throughout night, still having dysuria and trouble starting stream.  Skin and wound care done, poor hygiene pt with redness/yeast in all folds/leesa area, pressure injury on bottom, abrasion on head, scabbing/moist in belly button.

## 2024-04-03 NOTE — H&P
[facility]   HISTORY AND PHYSICAL    Patient Name: Charis Hill  : 1950  MRN: [id number]  Primary Care Physician:  Jaime Quintanilla MD  Date of admission: 2024    Subjective   Subjective     Chief Complaint: Dysuria    HPI:    Charis Hill is a 73 y.o. 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  Patient is nonverbal intubated prior stroke history was taken via chart review.  Patient was brought in by son with complaints of dysuria, intermittent confusion, and uncontrolled blood glucose.  In the ED patient's vitals were all within normal meds on arrival labs showed that she had leukocytosis with UA showing significant UTI.  She also had significant hyperglycemia with significantly reduced renal function from baseline.  Patient was admitted for further evaluation and treatment    Review of Systems   Patient nonverbal    Personal History     Past Medical History:   Diagnosis Date    Advance directive declined by patient 2020    Aphagia 2019    Diabetes mellitus, type II     Glaucoma     Hyperlipidemia     Hypertension     Insulin dependent type 2 diabetes mellitus, uncontrolled 2020    Medication management 2020       History reviewed. No pertinent surgical history.    Family History: family history includes Diabetes (age of onset: 63) in her father; Heart disease in her mother; Other in her mother. Otherwise pertinent FHx was reviewed and not pertinent to current issue.    Social History:  reports that she has never smoked. She has never used smokeless tobacco. She reports that she does not drink alcohol and does not use drugs.    Home Medications:  Insulin Glargine, Insulin Lispro (0.5 Unit Dial), Semaglutide(0.25 or 0.5MG/DOS), amLODIPine, aspirin, benazepril, cloNIDine, hydroCHLOROthiazide, latanoprost, metFORMIN ER, omeprazole, and  rosuvastatin      Allergies:  No Known Allergies    Objective   Objective     Vitals:   Temp:  [98 °F (36.7 °C)-98.6 °F (37 °C)] 98.6 °F (37 °C)  Heart Rate:  [] 74  Resp:  [12-23] 12  BP: (111-139)/(57-72) 139/57  Physical Exam    Constitutional: Awake, alert, patient currently   Eyes: PERRLA, sclerae anicteric, no conjunctival injection   HENT: NCAT, mucous membranes moist   Neck: Supple, no thyromegaly, no lymphadenopathy, trachea midline   Respiratory: Clear to auscultation bilaterally, nonlabored respirations    Cardiovascular: RRR, no murmurs, rubs, or gallops, palpable pedal pulses bilaterally   Gastrointestinal: Positive bowel sounds, soft, nontender, nondistended   Musculoskeletal: No bilateral ankle edema, no clubbing or cyanosis to extremities   Psychiatric: Appropriate affect, cooperative   Neurologic: Patient nonverbal, cranial nerves intact, pupils reactive   Skin: No rashes     Result Review    Result Review:  I have personally reviewed the results from the time of this admission to 4/2/2024 22:46 EDT and agree with these findings:  [x]  Laboratory list / accordion  []  Microbiology  []  Radiology  [x]  EKG/Telemetry   []  Cardiology/Vascular   []  Pathology  []  Old records  []  Other:  Most notable findings include: TORSTEN, leukocytosis, hyperglycemia, UTI        Assessment & Plan   Assessment / Plan     Brief Patient Summary:  Charis Hill is a 73 y.o. 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  Likely dehydrated  -IVF  -Avoid nephrotoxic drugs  -Nephrology consult if  warranted  -Will follow-up    UTI  -Patient with dysuria and lethargy/AMS  -UA reviewed  -Empiric antibiotics  -Blood and urine cultures  -IVF  -Will follow along    Uncontrolled type 2 diabetes  -A1c  -Levemir 20 units at bedtime  -High-dose insulin scale  -Titrate as needed    HTN  -Currently well controlled  -PRN BP meds  -Resume home meds when available  -Titrate if needed    History of CVA  Nonverbal    GI ppx  DVT ppx        CODE STATUS: Full code     Admission Status:  I believe this patient meets observation status.      Electronically signed by Arvind Jenkins MD, 04/02/24, 10:46 PM EDT.

## 2024-04-03 NOTE — PLAN OF CARE
Goal Outcome Evaluation:  Plan of Care Reviewed With: patient        Progress: no change  Outcome Evaluation: patient is alert and oriented x4, on 2L nc. patient is aphasic, can use phone to text and communicate. provided communication board and communication pictures that patient can point at to alert staff of thier needs. WOCN consulted and performed wound care, RN performed skin care as ordered. blood glucose monitored, continuous fluids maintained per order. no complaints of pain or discomfort this shift. no new issues at this time.Patient up in chair for part of shift.

## 2024-04-03 NOTE — SIGNIFICANT NOTE
Wound Eval / Progress Noted    CARA Bateman     Patient Name: Charis Hill  : 1950  MRN: [id number]  Today's Date: 4/3/2024                 Admit Date: 2024    Visit Dx:    ICD-10-CM ICD-9-CM   1. TORSTEN (acute kidney injury)  N17.9 584.9   2. Acute UTI  N39.0 599.0   3. Decreased activities of daily living (ADL)  Z78.9 V49.89         TORSTEN (acute kidney injury)    Diabetes mellitus, type II, insulin dependent    Hypertension    Hyperlipidemia    Speech disturbance    Stroke    Acute UTI        Past Medical History:   Diagnosis Date    Advance directive declined by patient 2020    Aphagia 2019    Diabetes mellitus, type II     Glaucoma     Hyperlipidemia     Hypertension     Insulin dependent type 2 diabetes mellitus, uncontrolled 2020    Medication management 2020        History reviewed. No pertinent surgical history.      Physical Assessment:  Wound 24 194 Left anterior hip (Active)   Wound Image   24 1950   Dressing Appearance open to air 24 1032   Closure None 24 1032   Base moist;red 24 1032   Periwound intact;redness;moist 24 1032   Periwound Temperature warm 24 1032   Periwound Skin Turgor soft 24 1032   Edges rolled/closed 24 1032   Drainage Amount none 24 1032   Dressing Care open to air 24 1032   Periwound Care dry periwound area maintained 24 1032       Wound 24 1945 Right gluteal Pressure Injury (Active)   Wound Image   24 1032   Pressure Injury Stage 3 24 1032   Dressing Appearance intact;moist drainage 24 1032   Closure None 24 1032   Base non-blanchable;moist;red;yeast;slough 24 1032   Periwound intact;dry;redness 24 1032   Periwound Temperature warm 24 1032   Periwound Skin Turgor soft 24 1032   Edges open 24 1032   Wound Length (cm) 1.2 cm 24 1032   Wound Width (cm) 1.5 cm 24 1032   Wound Depth (cm) 0.1 cm 24 1032    Wound Surface Area (cm^2) 1.8 cm^2 04/03/24 1032   Wound Volume (cm^3) 0.18 cm^3 04/03/24 1032   Drainage Characteristics/Odor serosanguineous 04/03/24 1032   Drainage Amount scant 04/03/24 1032   Care, Wound cleansed with;sterile normal saline 04/03/24 1032   Dressing Care dressing applied;border dressing;hydrofiber;silver impregnated;silicone 04/03/24 1032   Periwound Care absorptive dressing applied 04/03/24 1032       Wound 04/02/24 1945 Right head (Active)   Wound Image   04/03/24 1032   Dressing Appearance open to air 04/03/24 1032   Closure None 04/03/24 1032   Base dry;scab 04/03/24 1032   Periwound intact;dry;pink 04/03/24 1032   Periwound Temperature warm 04/03/24 1032   Periwound Skin Turgor soft 04/03/24 1032   Edges rolled/closed 04/03/24 1032   Drainage Amount none 04/03/24 1032   Care, Wound cleansed with;sterile normal saline 04/03/24 1032   Dressing Care open to air;skin barrier agent applied 04/03/24 1032   Periwound Care barrier ointment applied 04/03/24 1032       Wound 04/02/24 1945 Right anterior groin (Active)   Wound Image   04/1950   Dressing Appearance open to air 04/03/24 1032   Closure None 04/03/24 1032   Base moist;red 04/03/24 1032   Periwound intact;dry;redness;moist 04/03/24 1032   Periwound Temperature warm 04/03/24 1032   Periwound Skin Turgor soft 04/03/24 1032   Edges rolled/closed 04/03/24 1032   Drainage Amount none 04/03/24 1032   Dressing Care open to air 04/03/24 1032   Periwound Care dry periwound area maintained 04/03/24 1032       Wound 04/02/24 1945 umbilical area (Active)   Wound Image   04/1950   Dressing Appearance open to air 04/03/24 1032   Closure None 04/03/24 1032   Base moist;red;scab 04/03/24 1032   Periwound intact;dry;pink 04/03/24 1032   Periwound Temperature warm 04/03/24 1032   Periwound Skin Turgor soft 04/03/24 1032   Edges rolled/closed 04/03/24 1032   Drainage Amount none 04/03/24 1032   Care, Wound cleansed with;sterile normal saline  "04/03/24 1032   Dressing Care open to air;skin barrier agent applied 04/03/24 1032   Periwound Care barrier ointment applied 04/03/24 1032        Wound Check / Follow-up:  Patient seen today for new wound care consult. Patient laying in her bed awake but unable to answer questions appropriately. Patient answers most questions just by saying \"yes\". Patient's family is at the bedside and said this is her baseline due to a past stroke resulting in her having some dysphagia. Patient was admitted on 04/02/2024 for TORSTEN, UTI, confusion and weakness.     Patient's right anterior forehead is with dry, crusted tissue. Patient's family states that they believe this was a result of a fall at home. Periwound skin is pink, dry and intact. Cleansed with NS, blot dry and recommending to apply a thin layer of blue top barrier ointment to crusting TID to rehydrate and protect.     Patient has MASD to her bilateral groin, under abdominal fold, umbilical area and under bilateral breast. Skin is red, moist and intact at this time. Recommending to cleanse skin with no-rinse soap, pat dry and apply a thin layer of corn starch powder to bilateral groin, under abdominal fold, umbilical area and under bilateral breast TID for moisture management.     Patient's right gluteal aspect is with a stage 3 pressure injury. Wound bed is red, moist and with slight yellow slough present. Periwound is dry, red, blanchable and with crusting. Cleansed wound with NS, blot dry and recommending application of silver impregnates hydrofiber to the wound bed and cover with a silicone border dressing daily.     Patient's left gluteal aspects and gluteal crease are with blanchable, intact, redness. Recommending application of a thin layer of blue top barrier ointment to bilateral gluteal aspects and gluteal crease TID to protect and prevent further skin breakdown.     Patient's bilateral heels are dry, intact, soft with blanchable redness. Due to patient's " decreased mobility, patient is at a increased risk for skin breakdown. Recommending application of blue top barrier to patient's bilateral heels and bony prominences TID.     Recommending patient implement q2h turns and off loading heels at all times to promote wound healing and prevent further skin breakdown.     Impression: Right anterior forehead crusting, MASD to skin folds, Right gluteal stage 3 pressure injury, Bilateral gluteal blanchable redness, bilateral heels blanchable redness.     Short term goals:  Regain skin integrity, skin care, skin protection, daily wound care, pressure reduction, moisture management.     Ghazala Sage RN    4/3/2024    11:37 EDT

## 2024-04-03 NOTE — THERAPY EVALUATION
Patient Name: Charis Hill  : 1950    MRN: [id number]                              Today's Date: 4/3/2024       Admit Date: 2024    Visit Dx:     ICD-10-CM ICD-9-CM   1. TORSTEN (acute kidney injury)  N17.9 584.9   2. Acute UTI  N39.0 599.0   3. Decreased activities of daily living (ADL)  Z78.9 V49.89     Patient Active Problem List   Diagnosis    Diabetes mellitus, type II, insulin dependent    Hypertension    Hyperlipidemia    Advance directive declined by patient    Speech disturbance    Stroke    TORSTEN (acute kidney injury)    Acute UTI     Past Medical History:   Diagnosis Date    Advance directive declined by patient 2020    Aphagia 2019    Diabetes mellitus, type II     Glaucoma     Hyperlipidemia     Hypertension     Insulin dependent type 2 diabetes mellitus, uncontrolled 2020    Medication management 2020     History reviewed. No pertinent surgical history.   General Information       Row Name 24 1112          OT Time and Intention    Document Type evaluation  -AV     Mode of Treatment individual therapy;occupational therapy  -AV       Row Name 24 1112          General Information    Patient Profile Reviewed yes  -AV     Prior Level of Function independent:;ADL's;transfer  ambulates with walker. no home O2. questionable quality of ADL task performance as patient presented with poor hygiene at hospital admission per EMR.  -AV     Existing Precautions/Restrictions fall;oxygen therapy device and L/min  -AV     Barriers to Rehab none identified  -AV       Row Name 24 1112          Occupational Profile    Reason for Services/Referral (Occupational Profile) Patient is a 73 year old female admitted to [facility] on 24 with dysuria. She is currently on 4NT/ 2L O2. OT consulted due to impaired ADL/ transfer independence. No previous OT services for current condition.  -AV       Row Name 24 1112          Living Environment    People in Home alone   -AV       Row Name 04/03/24 1112          Home Main Entrance    Number of Stairs, Main Entrance none  -AV       Row Name 04/03/24 1112          Stairs Within Home, Primary    Number of Stairs, Within Home, Primary none  -AV       Row Name 04/03/24 1112          Cognition    Orientation Status (Cognition) --  alert. pleasant and cooperative. follows commands. 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  -AV       Row Name 04/03/24 1112          Safety Issues, Functional Mobility    Impairments Affecting Function (Mobility) balance;cognition;endurance/activity tolerance  -AV               User Key  (r) = Recorded By, (t) = Taken By, (c) = Cosigned By      Initials Name Provider Type    Ac Rudd OT Occupational Therapist                     Mobility/ADL's       Row Name 04/03/24 1116          Bed Mobility    Bed Mobility supine-sit-supine  -AV     Supine-Sit-Supine [place] (Bed Mobility) independent;verbal cues  -AV       Row Name 04/03/24 1116          Transfers    Transfers sit-stand transfer;bed-chair transfer  -AV       Row Name 04/03/24 1116          Bed-Chair Transfer    Bed-Chair [place] (Transfers) minimum assist (75% patient effort);contact guard  -AV       Row Name 04/03/24 1116          Sit-Stand Transfer    Sit-Stand [place] (Transfers) contact guard;verbal cues  -AV       Row Name 04/03/24 1116          Activities of Daily Living    BADL Assessment/Intervention --  Independent feeding. Min assist bathing, dressing and grooming with setup while seated. Assist toilet hygiene (bedpan and BSC).  -AV               User Key  (r) = Recorded By, (t) = Taken By, (c) = Cosigned By      Initials Name Provider Type    Ac Rudd OT Occupational Therapist                   Obj/Interventions       Row Name 04/03/24 1117          Sensory Assessment (Somatosensory)    Sensory Assessment (Somatosensory) UE sensation  intact  -AV       Row Name 04/03/24 1117          Vision Assessment/Intervention    Visual Impairment/Limitations WFL;corrective lenses full-time  -AV       Row Name 04/03/24 1117          Range of Motion Comprehensive    General Range of Motion bilateral upper extremity ROM WFL  -AV     Comment, General Range of Motion AROM  -AV       Row Name 04/03/24 1117          Strength Comprehensive (MMT)    Comment, General Manual Muscle Testing (MMT) Assessment 4/5 bilateral biceps, triceps and   -AV       Row Name 04/03/24 1117          Motor Skills    Motor Skills coordination;functional endurance  -AV     Coordination --  right dominant  -AV     Functional Endurance fair minus  -AV       Row Name 04/03/24 1117          Balance    Balance Assessment sitting dynamic balance;standing dynamic balance  -AV     Dynamic Sitting Balance independent  -AV     Dynamic Standing Balance minimal assist  -AV               User Key  (r) = Recorded By, (t) = Taken By, (c) = Cosigned By      Initials Name Provider Type    Ac Rudd OT Occupational Therapist                   Goals/Plan       [facility] Name 04/03/24 1120          Transfer Goal 1 (OT)    Activity/Assistive Device (Transfer Goal 1, OT) transfers, all  -AV     [place] Level/Cues Needed (Transfer Goal 1, OT) supervision required  -AV     Time Frame (Transfer Goal 1, OT) long term goal (LTG);10 days  -AV       Row Name 04/03/24 1120          Bathing Goal 1 (OT)    Activity/Device (Bathing Goal 1, OT) bathing skills, all  -AV     [place] Level/Cues Needed (Bathing Goal 1, OT) supervision required;set-up required;verbal cues required  -AV     Time Frame (Bathing Goal 1, OT) long term goal (LTG);10 days  -AV       Row Name 04/03/24 1120          Dressing Goal 1 (OT)    Activity/Device (Dressing Goal 1, OT) dressing skills, all  -AV     [place]/Cues Needed (Dressing Goal 1, OT) supervision required;set-up required;verbal cues required  -AV     Time Frame  (Dressing Goal 1, OT) long term goal (LTG);10 days  -AV       Row Name 04/03/24 1120          Toileting Goal 1 (OT)    Activity/Device (Toileting Goal 1, OT) toileting skills, all  -AV     [place] Level/Cues Needed (Toileting Goal 1, OT) supervision required;set-up required;verbal cues required  -AV     Time Frame (Toileting Goal 1, OT) long term goal (LTG);10 days  -AV       Row Name 04/03/24 1120          Grooming Goal 1 (OT)    Activity/Device (Grooming Goal 1, OT) grooming skills, all  -AV     [place] (Grooming Goal 1, OT) supervision required;set-up required;verbal cues required  -AV     Time Frame (Grooming Goal 1, OT) long term goal (LTG);10 days  -AV       Row Name 04/03/24 1120          Problem Specific Goal 1 (OT)    Problem Specific Goal 1 (OT) Patient will demonstrate fair plus endurance/ activity tolerance needed to support ADLs.  -AV     Time Frame (Problem Specific Goal 1, OT) long term goal (LTG);10 days  -AV       Row Name 04/03/24 1120          Therapy Assessment/Plan (OT)    Planned Therapy Interventions (OT) activity tolerance training;BADL retraining;functional balance retraining;occupation/activity based interventions;patient/caregiver education/training;ROM/therapeutic exercise;transfer/mobility retraining  -AV               User Key  (r) = Recorded By, (t) = Taken By, (c) = Cosigned By      Initials Name Provider Type    AV Ac Mclean OT Occupational Therapist                   Clinical Impression       Row Name 04/03/24 1118          Pain Assessment    Additional Documentation Pain Scale: FACES Pre/Post-Treatment (Group)  -AV       [facility] Name 04/03/24 1118          Pain Scale: FACES Pre/Post-Treatment    Pain: FACES Scale, Pretreatment 0-->no hurt  -AV     Posttreatment Pain Rating 0-->no hurt  -AV       Row Name 04/03/24 1118          Plan of Care Review    Plan of Care Reviewed With patient  -AV     Progress no change  first session: evaluation  -AV     Outcome Evaluation  Patient presents with limitations of balance and endurance/ activity tolerance which are impacting ADL/ transfer independence. Skilled OT is indicated to remediate/ compensate for deficits to maximize independence and safety with functional tasks.  -AV       Row Name 04/03/24 1118          Therapy Assessment/Plan (OT)    Patient/Family Therapy Goal Statement (OT) regain independence  -AV     Rehab Potential (OT) good, to achieve stated therapy goals  -AV     Criteria for Skilled Therapeutic Interventions Met (OT) yes;meets criteria;skilled treatment is necessary  -AV     Therapy Frequency (OT) 5 times/wk  -AV       Row Name 04/03/24 1118          Therapy Plan Review/Discharge Plan (OT)    Equipment Needs Upon Discharge (OT) commode chair;shower chair  -AV     Anticipated Discharge Disposition (OT) inpatient rehabilitation facility;sub acute care setting  -AV       Row Name 04/03/24 1118          Vital Signs    O2 Delivery Pre Treatment nasal cannula  2  -AV     O2 Delivery Intra Treatment nasal cannula  2  -AV     O2 Delivery Post Treatment nasal cannula  2  -AV       Row Name 04/03/24 1118          Positioning and Restraints    Pre-Treatment Position in bed  -AV     Post Treatment Position bed  -AV     In Bed call light within reach;encouraged to call for assist;exit alarm on  -AV               User Key  (r) = Recorded By, (t) = Taken By, (c) = Cosigned By      Initials Name Provider Type    AV Ac Mclean, [place] Occupational Therapist                   Outcome Measures       Row Name 04/03/24 1122          How much help from another is currently needed...    Putting on and taking off regular lower body clothing? 3  -AV     Bathing (including washing, rinsing, and drying) 3  -AV     Toileting (which includes using toilet bed pan or urinal) 3  -AV     Putting on and taking off regular upper body clothing 3  -AV     Taking care of personal grooming (such as brushing teeth) 3  -AV     Eating meals 4  -AV     AM-PAC 6  Clicks Score (OT) 19  -AV       Row Name 04/03/24 0747          How much help from another person do you currently need...    Turning from your back to your side while in flat bed without using bedrails? 3  -AR     Moving from lying on back to sitting on the side of a flat bed without bedrails? 3  -AR     Moving to and from a bed to a chair (including a wheelchair)? 3  -AR     Standing up from a chair using your arms (e.g., wheelchair, bedside chair)? 3  -AR     Climbing 3-5 steps with a railing? 2  -AR     To walk in hospital room? 2  -AR     AM-PAC 6 Clicks Score (PT) 16  -AR     Highest Level of Mobility Goal 5 --> Static standing  -AR       Row Name 04/03/24 1122          Functional Assessment    Outcome Measure Options AM-PAC 6 Clicks Daily Activity (OT);Optimal Instrument  -AV       Row Name 04/03/24 1122          Optimal Instrument    Optimal Instrument Optimal - 3  -AV     Bending/Stooping 2  -AV     Standing 2  -AV     Reaching 1  -AV     From the list, choose the 3 activities you would most like to be able to do without any difficulty Bending/stooping;Standing;Reaching  -AV     Total Score Optimal - 3 5  -AV               User Key  (r) = Recorded By, (t) = Taken By, (c) = Cosigned By      Initials Name Provider Type    Ac Rudd OT Occupational Therapist    Raven Greene, RN Registered Nurse                    Occupational Therapy Education       Title: PT OT SLP Therapies (Done)       Topic: Occupational Therapy (Done)       Point: ADL training (Done)       Description:   Instruct learner(s) on proper safety adaptation and remediation techniques during self care or transfers.   Instruct in proper use of assistive devices.                  Learning Progress Summary             Patient Acceptance, E, VU by AV at 4/3/2024 1122                         Point: Home exercise program (Done)       Description:   Instruct learner(s) on appropriate technique for monitoring, assisting and/or progressing  therapeutic exercises/activities.                  Learning Progress Summary             Patient Acceptance, E, VU by AV at 4/3/2024 1122                         Point: Precautions (Done)       Description:   Instruct learner(s) on prescribed precautions during self-care and functional transfers.                  Learning Progress Summary             Patient Acceptance, E, VU by AV at 4/3/2024 1122                         Point: Body mechanics (Done)       Description:   Instruct learner(s) on proper positioning and spine alignment during self-care, functional mobility activities and/or exercises.                  Learning Progress Summary             Patient Acceptance, E, VU by AV at 4/3/2024 1122                                         User Key       Initials Effective Dates Name Provider Type Discipline     06/16/21 -  Ac Mclean, OT Occupational Therapist OT                  OT Recommendation and Plan  Planned Therapy Interventions (OT): activity tolerance training, BADL retraining, functional balance retraining, occupation/activity based interventions, patient/caregiver education/training, ROM/therapeutic exercise, transfer/mobility retraining  Therapy Frequency (OT): 5 times/wk  Plan of Care Review  Plan of Care Reviewed With: patient  Progress: no change (first session: evaluation)  Outcome Evaluation: Patient presents with limitations of balance and endurance/ activity tolerance which are impacting ADL/ transfer independence. Skilled OT is indicated to remediate/ compensate for deficits to maximize independence and safety with functional tasks.     Time Calculation:   Evaluation Complexity (OT)  Review Occupational Profile/Medical/Therapy History Complexity: expanded/moderate complexity  Assessment, Occupational Performance/Identification of Deficit Complexity: 1-3 performance deficits  Clinical Decision Making Complexity (OT): problem focused assessment/low complexity  Overall Complexity of  Evaluation (OT): low complexity     Time Calculation- OT       Row Name 04/03/24 1124             Time Calculation- OT    OT Received On 04/03/24  -AV      OT Goal Re-Cert Due Date 04/12/24  -AV         Untimed Charges    OT Eval/Re-eval Minutes 35  -AV         Total Minutes    Untimed Charges Total Minutes 35  -AV       Total Minutes 35  -AV                User Key  (r) = Recorded By, (t) = Taken By, (c) = Cosigned By      Initials Name Provider Type    AV Ac Mclean OT Occupational Therapist                  Therapy Charges for Today       Code Description Service Date Service Provider Modifiers Qty    [id number] HC OT EVAL LOW COMPLEXITY 3 4/3/2024 Ac Mclean OT GO 1                 Ac Mclean OT  4/3/2024

## 2024-04-04 ENCOUNTER — READMISSION MANAGEMENT (OUTPATIENT)
Dept: CALL CENTER | Facility: HOSPITAL | Age: 74
End: 2024-04-04
Payer: MEDICARE

## 2024-04-04 VITALS
SYSTOLIC BLOOD PRESSURE: 128 MMHG | HEART RATE: 67 BPM | HEIGHT: 66 IN | TEMPERATURE: 98.2 F | WEIGHT: 171.96 LBS | RESPIRATION RATE: 18 BRPM | DIASTOLIC BLOOD PRESSURE: 57 MMHG | OXYGEN SATURATION: 92 % | BODY MASS INDEX: 27.64 KG/M2

## 2024-04-04 LAB
ANION GAP SERPL CALCULATED.3IONS-SCNC: 9.5 MMOL/L (ref 5–15)
BACTERIA SPEC AEROBE CULT: ABNORMAL
BUN SERPL-MCNC: 18 MG/DL (ref 8–23)
BUN/CREAT SERPL: 17.1 (ref 7–25)
CALCIUM SPEC-SCNC: 8.5 MG/DL (ref 8.6–10.5)
CHLORIDE SERPL-SCNC: 108 MMOL/L (ref 98–107)
CO2 SERPL-SCNC: 22.5 MMOL/L (ref 22–29)
CREAT SERPL-MCNC: 1.05 MG/DL (ref 0.57–1)
DEPRECATED RDW RBC AUTO: 46.7 FL (ref 37–54)
EGFRCR SERPLBLD CKD-EPI 2021: 56.2 ML/MIN/1.73
ERYTHROCYTE [DISTWIDTH] IN BLOOD BY AUTOMATED COUNT: 15.1 % (ref 12.3–15.4)
GLUCOSE BLDC GLUCOMTR-MCNC: 152 MG/DL (ref 70–99)
GLUCOSE BLDC GLUCOMTR-MCNC: 233 MG/DL (ref 70–99)
GLUCOSE BLDC GLUCOMTR-MCNC: 91 MG/DL (ref 70–99)
GLUCOSE SERPL-MCNC: 86 MG/DL (ref 65–99)
HCT VFR BLD AUTO: 38.3 % (ref 34–46.6)
HGB BLD-MCNC: 12.1 G/DL (ref 12–15.9)
MAGNESIUM SERPL-MCNC: 1.9 MG/DL (ref 1.6–2.4)
MCH RBC QN AUTO: 26.8 PG (ref 26.6–33)
MCHC RBC AUTO-ENTMCNC: 31.6 G/DL (ref 31.5–35.7)
MCV RBC AUTO: 84.9 FL (ref 79–97)
PLATELET # BLD AUTO: 215 10*3/MM3 (ref 140–450)
PMV BLD AUTO: 12 FL (ref 6–12)
POTASSIUM SERPL-SCNC: 3.7 MMOL/L (ref 3.5–5.2)
RBC # BLD AUTO: 4.51 10*6/MM3 (ref 3.77–5.28)
SODIUM SERPL-SCNC: 140 MMOL/L (ref 136–145)
WBC NRBC COR # BLD AUTO: 8.18 10*3/MM3 (ref 3.4–10.8)

## 2024-04-04 PROCEDURE — 25010000002 HEPARIN (PORCINE) PER 1000 UNITS: Performed by: FAMILY MEDICINE

## 2024-04-04 PROCEDURE — 63710000001 INSULIN LISPRO (HUMAN) PER 5 UNITS: Performed by: FAMILY MEDICINE

## 2024-04-04 PROCEDURE — 80048 BASIC METABOLIC PNL TOTAL CA: CPT | Performed by: FAMILY MEDICINE

## 2024-04-04 PROCEDURE — 82948 REAGENT STRIP/BLOOD GLUCOSE: CPT

## 2024-04-04 PROCEDURE — 25010000002 CEFTRIAXONE PER 250 MG: Performed by: FAMILY MEDICINE

## 2024-04-04 PROCEDURE — 85027 COMPLETE CBC AUTOMATED: CPT | Performed by: FAMILY MEDICINE

## 2024-04-04 PROCEDURE — 83735 ASSAY OF MAGNESIUM: CPT | Performed by: INTERNAL MEDICINE

## 2024-04-04 PROCEDURE — 97116 GAIT TRAINING THERAPY: CPT

## 2024-04-04 PROCEDURE — 99239 HOSP IP/OBS DSCHRG MGMT >30: CPT | Performed by: INTERNAL MEDICINE

## 2024-04-04 PROCEDURE — 82948 REAGENT STRIP/BLOOD GLUCOSE: CPT | Performed by: FAMILY MEDICINE

## 2024-04-04 RX ORDER — CEFDINIR 300 MG/1
300 CAPSULE ORAL 2 TIMES DAILY
Qty: 8 CAPSULE | Refills: 0 | Status: SHIPPED | OUTPATIENT
Start: 2024-04-04 | End: 2024-04-08

## 2024-04-04 RX ADMIN — Medication 250 MG: at 09:47

## 2024-04-04 RX ADMIN — INSULIN LISPRO 3 UNITS: 100 INJECTION, SOLUTION INTRAVENOUS; SUBCUTANEOUS at 12:45

## 2024-04-04 RX ADMIN — HEPARIN SODIUM 5000 UNITS: 5000 INJECTION INTRAVENOUS; SUBCUTANEOUS at 09:47

## 2024-04-04 RX ADMIN — CLONIDINE HYDROCHLORIDE 0.1 MG: 0.1 TABLET ORAL at 09:47

## 2024-04-04 RX ADMIN — PANTOPRAZOLE SODIUM 40 MG: 40 TABLET, DELAYED RELEASE ORAL at 05:17

## 2024-04-04 RX ADMIN — ASPIRIN 81 MG: 81 TABLET, COATED ORAL at 09:47

## 2024-04-04 RX ADMIN — Medication 10 ML: at 09:48

## 2024-04-04 RX ADMIN — LATANOPROST 1 DROP: 50 SOLUTION OPHTHALMIC at 09:48

## 2024-04-04 RX ADMIN — INSULIN LISPRO 5 UNITS: 100 INJECTION, SOLUTION INTRAVENOUS; SUBCUTANEOUS at 17:52

## 2024-04-04 RX ADMIN — AMLODIPINE BESYLATE 10 MG: 10 TABLET ORAL at 09:48

## 2024-04-04 RX ADMIN — CEFTRIAXONE SODIUM 1000 MG: 1 INJECTION, POWDER, FOR SOLUTION INTRAMUSCULAR; INTRAVENOUS at 16:42

## 2024-04-04 NOTE — DISCHARGE SUMMARY
Date of Admission: 4/2/2024    Date of Discharge:  4/4/2024    Length of stay:  LOS: 1 day     Admission Diagnosis:   Acute UTI [N39.0]  TORSTEN (acute kidney injury) [N17.9]      Discharge Diagnosis:     TORSTEN- resolved   - Patient with UTI, uncontrolled glucose.  Likely dehydrated  - improved with IV fluids      Acute UTI d/t E coli- POA   - Patient with dysuria and lethargy/AMS  - initially on IV Rocephin, will complete course of with Omnicef     Uncontrolled type 2 diabetes with hyperglycemia   - A1c 9.2   - resume home meds      HTN  - holding hctz  - continue amlodipine, clonidine, benazepril   - follow up with PCP in one week or sooner if needed      History of CVA  Nonverbal at baseline   - continue aspirin, statin         Active Hospital Problems:    Active Hospital Problems    Diagnosis  POA    **TORSTEN (acute kidney injury) [N17.9]  Yes    Acute UTI [N39.0]  Yes    Stroke [I63.9]  Yes    Speech disturbance [R47.9]  Yes    Hyperlipidemia [E78.5]  Yes    Hypertension [I10]  Yes    Diabetes mellitus, type II, insulin dependent [E11.9, Z79.4]  Not Applicable      Resolved Hospital Problems   No resolved problems to display.       Hospital Course:  Charis Hill is a 73 y.o. 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  Patient is nonverbal d/t prior stroke and history was taken via chart review.  Patient was brought in by son with complaints of dysuria, intermittent confusion, weakness, and uncontrolled blood glucose.  In the ED patient's vitals were all within normal meds on arrival labs showed that she had leukocytosis with UA showing significant UTI.  She also had significant hyperglycemia with significantly reduced renal function from baseline.  Patient was admitted and started on IV fluids and IV antibiotics. Her kidney function and blood sugar have improved. She will be discharged on oral antibiotics and I  will continue to hold her HCTZ as blood pressures have been reasonable off of it and can follow up with PCP to see if it needs to be resumed.   Patient was evaluated by physical therapy and occupational therapy and inpatient rehab was recommended. Patient has declined inpatient rehab and home health. The  reached out to the patient's son who agrees rehab would be beneficial, but since the patient is decisional we have to obey her wishes.      Procedures Performed:  none         Consults:   Consults       Date and Time Order Name Status Description    4/2/2024  5:18 PM Inpatient Hospitalist Consult              Vital Signs:  Temp:  [97.5 °F (36.4 °C)-98.2 °F (36.8 °C)] 98.2 °F (36.8 °C)  Heart Rate:  [58-75] 67  Resp:  [18-20] 18  BP: ()/(50-66) 128/57  Body mass index is 27.75 kg/m².    Physical Exam:  Physical Exam  Vitals reviewed.   HENT:      Head: Normocephalic and atraumatic.   Cardiovascular:      Rate and Rhythm: Normal rate.      Heart sounds: No murmur heard.  Pulmonary:      Effort: Pulmonary effort is normal. No respiratory distress.   Abdominal:      General: Bowel sounds are normal.      Palpations: Abdomen is soft.   Neurological:      Mental Status: She is alert.      Comments: Mostly nonverbal, can say yes and no   Psychiatric:         Mood and Affect: Mood normal.             Wounds (Last 24 Hours)       LDA Wound       Row Name 04/04/24 1438 04/04/24 0751 04/03/24 2230       Wound 04/02/24 1945 Left anterior hip    Wound - Properties Group Placement Date: 04/02/24  -LR Placement Time: 1945  -LR Present on Original Admission: Y  -LR Side: Left  -LR Orientation: anterior  -LR Location: hip  -LR    Dressing Appearance -- open to air  -AGAPITO open to air  -KS    Care, Wound cleansed with;soap and water  -AGAPITO -- --    Dressing Care open to air;skin barrier agent applied  -AGAPITO -- --    Retired Wound - Properties Group Placement Date: 04/02/24  -LR Placement Time: 1945  -LR Present on  Original Admission: Y  -LR Side: Left  -LR Orientation: anterior  -LR Location: hip  -LR    Retired Wound - Properties Group Date first assessed: 04/02/24  -LR Time first assessed: 1945 -LR Present on Original Admission: Y  -LR Side: Left  -LR Location: hip  -LR       Wound 04/02/24 1945 Right gluteal Pressure Injury    Wound - Properties Group Placement Date: 04/02/24  -LR Placement Time: 1945 -LR Present on Original Admission: Y  -LR Side: Right  -LR Location: gluteal  -LR Primary Wound Type: Pressure inj  -LR    Dressing Appearance -- dressing loose  -AGAPITO dry;intact  -KS    Care, Wound -- cleansed with;sterile normal saline  -AGAPITO --    Dressing Care -- dressing changed;other (see comments)  aquacel ag  -AGAPITO --    Retired Wound - Properties Group Placement Date: 04/02/24  -LR Placement Time: 1945 -LR Present on Original Admission: Y  -LR Side: Right  -LR Location: gluteal  -LR Primary Wound Type: Pressure inj  -LR    Retired Wound - Properties Group Date first assessed: 04/02/24  -LR Time first assessed: 1945 -LR Present on Original Admission: Y  -LR Side: Right  -LR Location: gluteal  -LR Primary Wound Type: Pressure inj  -LR       Wound 04/02/24 1945 Right head    Wound - Properties Group Placement Date: 04/02/24  -LR Placement Time: 1945 -LR Present on Original Admission: Y  -LR Side: Right  -LR Location: head  -LR    Dressing Appearance open to air  -AGAPITO open to air  -AGAPITO open to air  -KS    Care, Wound cleansed with;sterile normal saline  -AGAPITO -- --    Dressing Care open to air;skin barrier agent applied  -AGAPITO -- --    Retired Wound - Properties Group Placement Date: 04/02/24  -LR Placement Time: 1945 -LR Present on Original Admission: Y  -LR Side: Right  -LR Location: head  -LR    Retired Wound - Properties Group Date first assessed: 04/02/24  -LR Time first assessed: 1945 -LR Present on Original Admission: Y  -LR Side: Right  -LR Location: head  -LR       Wound 04/02/24 1945 Right anterior groin    Wound -  Properties Group Placement Date: 04/02/24  -LR Placement Time: 1945  -LR Present on Original Admission: Y  -LR Side: Right  -LR Orientation: anterior  -LR Location: groin  -LR    Dressing Appearance open to air  -AGAPITO open to air  -AGAPITO open to air  -KS    Care, Wound cleansed with;soap and water  -AGAPITO -- --    Dressing Care open to air;skin barrier agent applied  -AGAPITO -- --    Retired Wound - Properties Group Placement Date: 04/02/24  -LR Placement Time: 1945  -LR Present on Original Admission: Y  -LR Side: Right  -LR Orientation: anterior  -LR Location: groin  -LR    Retired Wound - Properties Group Date first assessed: 04/02/24  -LR Time first assessed: 1945  -LR Present on Original Admission: Y  -LR Side: Right  -LR Location: groin  -LR       Wound 04/02/24 1945 umbilical area    Wound - Properties Group Placement Date: 04/02/24  -LR Placement Time: 1945  -LR Present on Original Admission: Y  -LR Location: umbilical area  -LR    Dressing Appearance -- open to air  -AGAPITO open to air  -KS    Care, Wound cleansed with;soap and water  -AGAPITO -- --    Dressing Care open to air;skin barrier agent applied  -AGAPITO -- --    Retired Wound - Properties Group Placement Date: 04/02/24  -LR Placement Time: 1945  -LR Present on Original Admission: Y  -LR Location: umbilical area  -LR    Retired Wound - Properties Group Date first assessed: 04/02/24  -LR Time first assessed: 1945  -LR Present on Original Admission: Y  -LR Location: umbilical area  -LR              User Key  (r) = Recorded By, (t) = Taken By, (c) = Cosigned By      Initials Name Provider Type    Alexandria Saucedo, RN Registered Nurse    Susie Rausch RN Registered Nurse    Rigoberto Rodriguez RN Registered Nurse                      Pertinent Test Results:   Lab Results (last 72 hours)       Procedure Component Value Units Date/Time    POC Glucose 4x Daily Before Meals & at Bedtime [[id number]]  (Abnormal) Collected: 04/04/24 1200    Specimen: Blood Updated: 04/04/24 1202      Glucose 152 mg/dL      Comment: Serial Number: [id number]:  [id number]       Urine Culture - Urine, Urine, Clean Catch [[id number]]  (Abnormal)  (Susceptibility) Collected: 04/02/24 1622    Specimen: Urine, Clean Catch Updated: 04/04/24 1010     Urine Culture >100,000 CFU/mL Escherichia coli    Narrative:      Colonization of the urinary tract without infection is common. Treatment is discouraged unless the patient is symptomatic, pregnant, or undergoing an invasive urologic procedure.    Susceptibility        Escherichia coli      ANDREY      Amoxicillin + Clavulanate Susceptible      Ampicillin Susceptible      Ampicillin + Sulbactam Susceptible      Cefazolin Susceptible      Cefepime Susceptible      Ceftazidime Susceptible      Ceftriaxone Susceptible      Gentamicin Susceptible      Levofloxacin Susceptible      Nitrofurantoin Susceptible      Piperacillin + Tazobactam Susceptible      Trimethoprim + Sulfamethoxazole Susceptible                           POC Glucose Once [[id number]]  (Normal) Collected: 04/04/24 0730    Specimen: Blood Updated: 04/04/24 0747     Glucose 91 mg/dL      Comment: Serial Number: [id number]:  [id number]       Basic Metabolic Panel [[id number]]  (Abnormal) Collected: 04/04/24 0623    Specimen: Blood from Hand, Right Updated: 04/04/24 0718     Glucose 86 mg/dL      BUN 18 mg/dL      Creatinine 1.05 mg/dL      Sodium 140 mmol/L      Potassium 3.7 mmol/L      Chloride 108 mmol/L      CO2 22.5 mmol/L      Calcium 8.5 mg/dL      BUN/Creatinine Ratio 17.1     Anion Gap 9.5 mmol/L      eGFR 56.2 mL/min/1.73     Narrative:      GFR Normal >60  Chronic Kidney Disease <60  Kidney Failure <15    The GFR formula is only valid for adults with stable renal function between ages 18 and 70.    Magnesium [[id number]]  (Normal) Collected: 04/04/24 0623    Specimen: Blood from Hand, Right Updated: 04/04/24 0718     Magnesium 1.9 mg/dL     CBC (No Diff) [[id number]]  (Normal) Collected:  04/04/24 0632    Specimen: Blood Updated: 04/04/24 0645     WBC 8.18 10*3/mm3      RBC 4.51 10*6/mm3      Hemoglobin 12.1 g/dL      Hematocrit 38.3 %      MCV 84.9 fL      MCH 26.8 pg      MCHC 31.6 g/dL      RDW 15.1 %      RDW-SD 46.7 fl      MPV 12.0 fL      Platelets 215 10*3/mm3     POC Glucose Once [[id number]]  (Abnormal) Collected: 04/03/24 1958    Specimen: Blood Updated: 04/03/24 2001     Glucose 263 mg/dL      Comment: Serial Number: [id number]:  [id number]       POC Glucose Once [[id number]]  (Abnormal) Collected: 04/03/24 1727    Specimen: Blood Updated: 04/03/24 1735     Glucose 198 mg/dL      Comment: Serial Number: [id number]:  [id number]       POC Glucose Once [[id number]]  (Abnormal) Collected: 04/03/24 1154    Specimen: Blood Updated: 04/03/24 1157     Glucose 189 mg/dL      Comment: Serial Number: [id number]:  [id number]       Hemoglobin A1c [[id number]]  (Abnormal) Collected: 04/02/24 1547    Specimen: Blood Updated: 04/03/24 1001     Hemoglobin A1C 9.20 %     Narrative:      Hemoglobin A1C Ranges:    Increased Risk for Diabetes  5.7% to 6.4%  Diabetes                     >= 6.5%  Diabetic Goal                < 7.0%    CBC (No Diff) [[id number]]  (Abnormal) Collected: 04/03/24 0838    Specimen: Blood from Hand, Left Updated: 04/03/24 0909     WBC 10.83 10*3/mm3      RBC 4.86 10*6/mm3      Hemoglobin 13.2 g/dL      Hematocrit 41.4 %      MCV 85.2 fL      MCH 27.2 pg      MCHC 31.9 g/dL      RDW 15.2 %      RDW-SD 47.6 fl      MPV 12.3 fL      Platelets 263 10*3/mm3     POC Glucose Once [[id number]]  (Abnormal) Collected: 04/03/24 0740    Specimen: Blood Updated: 04/03/24 0753     Glucose 121 mg/dL      Comment: Serial Number: [id number]:  [id number]       Basic Metabolic Panel [[id number]]  (Abnormal) Collected: 04/03/24 0633    Specimen: Blood from Hand, Left Updated: 04/03/24 0706     Glucose 151 mg/dL      BUN 29 mg/dL      Creatinine 1.58 mg/dL      Sodium 140 mmol/L       Potassium 4.0 mmol/L      Chloride 109 mmol/L      CO2 20.5 mmol/L      Calcium 9.1 mg/dL      BUN/Creatinine Ratio 18.4     Anion Gap 10.5 mmol/L      eGFR 34.4 mL/min/1.73     Narrative:      GFR Normal >60  Chronic Kidney Disease <60  Kidney Failure <15    The GFR formula is only valid for adults with stable renal function between ages 18 and 70.    POC Glucose Once [[id number]]  (Abnormal) Collected: 04/02/24 2024    Specimen: Blood Updated: 04/02/24 2028     Glucose 232 mg/dL      Comment: Serial Number: [id number]:  [id number]       Urinalysis, Microscopic Only - Urine, Clean Catch [[id number]]  (Abnormal) Collected: 04/02/24 1622    Specimen: Urine, Clean Catch Updated: 04/02/24 1705     RBC, UA None Seen /HPF      WBC, UA Too Numerous to Count /HPF      Bacteria, UA 3+ /HPF      Squamous Epithelial Cells, UA 0-2 /HPF      Hyaline Casts, UA None Seen /LPF      Methodology Manual Light Microscopy    VBG with Co-Ox and Electrolytes [[id number]]  (Abnormal) Collected: 04/02/24 1644    Specimen: Venous Blood Updated: 04/02/24 1648     pH, Venous 7.391 pH Units      pCO2, Venous 37.0 mm Hg      pO2, Venous <40.5 mm Hg      HCO3, Venous 21.9 mmol/L      Base Excess, Venous -2.4 mmol/L      O2 Saturation, Venous 55.0 %      Hemoglobin, Blood Gas 15.8 g/dL      Carboxyhemoglobin 1.6 %      Methemoglobin 0.20 %      Oxyhemoglobin 54.0 %      FHHB 44.2 %      Note --     Site Drawn by RN     Modality RA     FIO2 21 %      Flow Rate 0 lpm      Sodium, Venous 137.5 mmol/L      Potassium, Venous 4.9 mmol/L      Ionized Calcium, Arterial 1.18 mmol/L      Chloride, Venous  99 mmol/L      Glucose, Arterial 399 mg/dL      Lactate, Arterial 1.95 mmol/L     Urinalysis With Microscopic If Indicated (No Culture) - Urine, Clean Catch [[id number]]  (Abnormal) Collected: 04/02/24 1622    Specimen: Urine, Clean Catch Updated: 04/02/24 1645     Color, UA Dark Yellow     Appearance, UA Turbid     pH, UA 5.5     Specific Gravity, UA  1.022     Glucose,  mg/dL (Trace)     Ketones, UA 15 mg/dL (1+)     Bilirubin, UA Negative     Blood, UA Moderate (2+)     Protein, UA >=300 mg/dL (3+)     Leuk Esterase, UA Large (3+)     Nitrite, UA Negative     Urobilinogen, UA 1.0 E.U./dL    Acetone [[id number]]  (Normal) Collected: 04/02/24 1547    Specimen: Blood Updated: 04/02/24 1635     Acetone Negative    Comprehensive Metabolic Panel [[id number]]  (Abnormal) Collected: 04/02/24 1547    Specimen: Blood Updated: 04/02/24 1627     Glucose 405 mg/dL      BUN 37 mg/dL      Creatinine 3.19 mg/dL      Sodium 134 mmol/L      Potassium 4.8 mmol/L      Chloride 95 mmol/L      CO2 22.5 mmol/L      Calcium 9.9 mg/dL      Total Protein 8.7 g/dL      Albumin 4.4 g/dL      ALT (SGPT) 14 U/L      AST (SGOT) 18 U/L      Alkaline Phosphatase 144 U/L      Total Bilirubin 0.8 mg/dL      Globulin 4.3 gm/dL      A/G Ratio 1.0 g/dL      BUN/Creatinine Ratio 11.6     Anion Gap 16.5 mmol/L      eGFR 14.8 mL/min/1.73      Comment: <15 Indicative of kidney failure       Narrative:      GFR Normal >60  Chronic Kidney Disease <60  Kidney Failure <15    The GFR formula is only valid for adults with stable renal function between ages 18 and 70.    CBC & Differential [[id number]]  (Abnormal) Collected: 04/02/24 1547    Specimen: Blood Updated: 04/02/24 1555    Narrative:      The following orders were created for panel order CBC & Differential.  Procedure                               Abnormality         Status                     ---------                               -----------         ------                     CBC Auto Differential[[id number]]        Abnormal            Final result                 Please view results for these tests on the individual orders.    CBC Auto Differential [[id number]]  (Abnormal) Collected: 04/02/24 1547    Specimen: Blood Updated: 04/02/24 1555     WBC 10.86 10*3/mm3      RBC 5.61 10*6/mm3      Hemoglobin 15.4 g/dL      Hematocrit 47.6 %      MCV  84.8 fL      MCH 27.5 pg      MCHC 32.4 g/dL      RDW 15.4 %      RDW-SD 47.5 fl      MPV 12.1 fL      Platelets 321 10*3/mm3      Neutrophil % 71.8 %      Lymphocyte % 18.7 %      Monocyte % 8.3 %      Eosinophil % 0.1 %      Basophil % 0.7 %      Immature Grans % 0.4 %      Neutrophils, Absolute 7.80 10*3/mm3      Lymphocytes, Absolute 2.03 10*3/mm3      Monocytes, Absolute 0.90 10*3/mm3      Eosinophils, Absolute 0.01 10*3/mm3      Basophils, Absolute 0.08 10*3/mm3      Immature Grans, Absolute 0.04 10*3/mm3      nRBC 0.0 /100 WBC     Vandiver Draw [[id number]] Collected: 04/02/24 1547    Specimen: Blood Updated: 04/02/24 1554    Narrative:      The following orders were created for panel order Vandiver Draw.  Procedure                               Abnormality         Status                     ---------                               -----------         ------                     Green Top (Gel)[[id number]]                                  Final result               Lavender Top[[id number]]                                     Final result               Gold Top - SST[[id number]]                                   Final result               Light Blue Top[[id number]]                                   Final result                 Please view results for these tests on the individual orders.    Lavender Top [[id number]] Collected: 04/02/24 1547    Specimen: Blood Updated: 04/02/24 1554     Extra Tube hold for add-on     Comment: Auto resulted       Light Blue Top [[id number]] Collected: 04/02/24 1547    Specimen: Blood Updated: 04/02/24 1554     Extra Tube Hold for add-ons.     Comment: Auto resulted       Gold Top - SST [[id number]] Collected: 04/02/24 1547    Specimen: Blood Updated: 04/02/24 1553     Extra Tube Hold for add-ons.     Comment: Auto resulted.       Green Top (Gel) [[id number]] Collected: 04/02/24 1547    Specimen: Blood Updated: 04/02/24 1553     Extra Tube Hold for add-ons.     Comment: Auto resulted.          "    Imaging Results (Most Recent)       None             No results found for: \"BLOODCX\"     Urine Culture   Date Value Ref Range Status   04/02/2024 >100,000 CFU/mL Escherichia coli (A)  Final         Imaging Results (All)       None                  Discharge Disposition:  Home or Self Care    Discharge Medications:     Discharge Medications        New Medications        Instructions Start Date   cefdinir 300 MG capsule  Commonly known as: OMNICEF   300 mg, Oral, 2 Times Daily             Changes to Medications        Instructions Start Date   Lantus SoloStar 100 UNIT/ML injection pen  Generic drug: Insulin Glargine  What changed: See the new instructions.   INJECT 28 UNITS UNDER THE SKIN INTO THE APPROPRIATE AREA DAILY AS DIRECTED EVERY NIGHT             Continue These Medications        Instructions Start Date   amLODIPine 10 MG tablet  Commonly known as: NORVASC   10 mg, Oral, Daily      aspirin 81 MG EC tablet   81 mg, Oral, Daily      benazepril 40 MG tablet  Commonly known as: LOTENSIN   40 mg, Oral, Daily      cloNIDine 0.1 MG tablet  Commonly known as: CATAPRES   0.1 mg, Oral, 2 Times Daily      Insulin Lispro (0.5 Unit Dial) 100 UNIT/ML solution pen-injector  Commonly known as: HUMALOG KWIKPEN ANJANA   3 Units, Subcutaneous, 3 Times Daily Before Meals      latanoprost 0.005 % ophthalmic solution  Commonly known as: XALATAN   1 drop, Ophthalmic, Daily      metFORMIN  MG 24 hr tablet  Commonly known as: GLUCOPHAGE-XR   1,000 mg, Oral, Daily With Breakfast      omeprazole 40 MG capsule  Commonly known as: priLOSEC   40 mg, Oral, Daily      rosuvastatin 5 MG tablet  Commonly known as: CRESTOR   5 mg, Oral, Daily      Semaglutide(0.25 or 0.5MG/DOS) 2 MG/3ML solution pen-injector  Commonly known as: OZEMPIC   0.5 mg, Subcutaneous, Weekly             Stop These Medications      hydroCHLOROthiazide 25 MG tablet                Discharge Diet:   Diet Instructions       Diet: Cardiac Diets, Diabetic Diets; " Healthy Heart (2-3 Na+); Regular (IDDSI 7); Thin (IDDSI 0); Consistent Carbohydrate      Discharge Diet:  Cardiac Diets  Diabetic Diets       Cardiac Diet: Healthy Heart (2-3 Na+)    Texture: Regular (IDDSI 7)    Fluid Consistency: Thin (IDDSI 0)    Diabetic Diet: Consistent Carbohydrate            Activity at Discharge:   Activity Instructions       Activity as Tolerated              Follow-up Appointments  Future Appointments   Date Time Provider Department Center   4/12/2024 10:45 AM Jaime Quintanilla MD [facility]       Additional Instructions for the Follow-ups that You Need to Schedule       Discharge Follow-up with PCP   As directed       Currently Documented PCP:    Jaime Quintanilla MD    PCP Phone Number:    [phone]     Follow Up Details: within 3-5 days                Test Results Pending at Discharge:      Condition on Discharge:    Stable      Risk for Readmission (LACE): Score: 7 (4/4/2024  6:00 AM)          Kate Shannon DO  04/04/24  16:54 EDT    Time: Discharge 35 min with face-to-face history/exam, writing all prescriptions, and documenting discharge data including care coordination with nursing and case management.             Note disclaimer: At [facility], we believe that sharing information builds trust and better relationships. You are receiving this note because you recently visited [facility]. It is possible you will see health information before a provider has talked with you about it. This kind of information can be easy to misunderstand. To help you fully understand what it means for your health, we urge you to discuss this note with your provider.

## 2024-04-04 NOTE — PLAN OF CARE
Goal Outcome Evaluation:  Plan of Care Reviewed With: patient        Progress: improving  Outcome Evaluation: Alert and oriented x4. No complaints of pain. Wound care and skin care provided. IV antibiotic administered per mar. up to chair this shift. Will provide discharge info. Patient educated on discharge plan and follow up appointments. Discharge information provided for patient's son as well.

## 2024-04-04 NOTE — OUTREACH NOTE
Prep Survey      Flowsheet Row Responses   [facility] patient discharged from? [facility]   Is LACE score < 7 ? No   Eligibility [facility]   Date of Admission 04/02/24   Date of Discharge 04/04/24   Discharge Disposition Home or Self Care   Discharge diagnosis TORSTEN, Acute UTI   Does the patient have one of the following disease processes/diagnoses(primary or secondary)? Other   Does the patient have Home health ordered? No   Is there a DME ordered? No   Prep survey completed? Yes            Tila ZEPEDA - Registered Nurse

## 2024-04-04 NOTE — THERAPY TREATMENT NOTE
Acute Care - Physical Therapy Treatment Note  [facility]     Patient Name: Charis Hill  : 1950  MRN: [id number]  Today's Date: 2024    Admit date: 2024     Referring Physician: Kate Shannon, *     Surgery Date:* No surgery found *            Visit Dx:     ICD-10-CM ICD-9-CM   1. TORSTEN (acute kidney injury)  N17.9 584.9   2. Acute UTI  N39.0 599.0   3. Decreased activities of daily living (ADL)  Z78.9 V49.89   4. Difficulty walking  R26.2 719.7     Patient Active Problem List   Diagnosis    Diabetes mellitus, type II, insulin dependent    Hypertension    Hyperlipidemia    Advance directive declined by patient    Speech disturbance    Stroke    TORSTEN (acute kidney injury)    Acute UTI     Past Medical History:   Diagnosis Date    Advance directive declined by patient 2020    Aphagia 2019    Diabetes mellitus, type II     Glaucoma     Hyperlipidemia     Hypertension     Insulin dependent type 2 diabetes mellitus, uncontrolled 2020    Medication management 2020     History reviewed. No pertinent surgical history.  PT Assessment (Last 12 Hours)       PT Evaluation and Treatment       Row Name 24 1355          Physical Therapy Time and Intention    Subjective Information no complaints  -LINDA     Document Type therapy note (daily note)  -LINDA     Mode of Treatment individual therapy;physical therapy  -LINDA     Patient Effort good  -LINDA     Symptoms Noted During/After Treatment none  -LINDA       Row Name 24 1355          General Information    Patient Profile Reviewed yes  -LINDA     Patient Observations alert;cooperative;agree to therapy  -LINDA       Row Name 24 1355          Pain    Pretreatment Pain Rating 0/10 - no pain  -LINDA     Posttreatment Pain Rating 0/10 - no pain  -LINDA       Row Name 24 1355          Bed Mobility    Bed Mobility supine-sit  -LINDA     Supine-Sit [place] (Bed Mobility) minimum assist (75% patient effort)  -LINDA     Assistive Device (Bed  Mobility) bed rails;head of bed elevated  -LINDA       Row Name 04/04/24 1355          Transfers    Transfers sit-stand transfer;stand-sit transfer  -LINDA       Row Name 04/04/24 1355          Sit-Stand Transfer    Sit-Stand [place] (Transfers) minimum assist (75% patient effort)  -LINDA     Assistive Device (Sit-Stand Transfers) walker, front-wheeled  -LINDA       Row Name 04/04/24 1355          Stand-Sit Transfer    Stand-Sit [place] (Transfers) contact guard  -LINDA     Assistive Device (Stand-Sit Transfers) walker, front-wheeled  -LINDA       Row Name 04/04/24 1355          Gait/Stairs (Locomotion)    Gait/Stairs Locomotion gait/ambulation assistive device  -LINDA     [place] Level (Gait) minimum assist (75% patient effort)  Patient needs assitance directing the walker.  -LINDA     Assistive Device (Gait) walker, front-wheeled  -LINDA     Patient was able to Ambulate yes  -LINDA     Distance in Feet (Gait) 100  -LINDA       Row Name 04/04/24 1355          Balance    Balance Assessment standing dynamic balance  -LINDA     Dynamic Standing Balance minimal assist  -LINDA     Position/Device Used, Standing Balance walker, front-wheeled  -LINDA       Row Name             Wound 04/02/24 1945 Left anterior hip    Wound - Properties Group Placement Date: 04/02/24  -LR Placement Time: 1945  -LR Present on Original Admission: Y  -LR Side: Left  -LR Orientation: anterior  -LR Location: hip  -LR    Retired Wound - Properties Group Placement Date: 04/02/24  -LR Placement Time: 1945  -LR Present on Original Admission: Y  -LR Side: Left  -LR Orientation: anterior  -LR Location: hip  -LR    Retired Wound - Properties Group Date first assessed: 04/02/24  -LR Time first assessed: 1945  -LR Present on Original Admission: Y  -LR Side: Left  -LR Location: hip  -LR      Row Name             Wound 04/02/24 1945 Right gluteal Pressure Injury    Wound - Properties Group Placement Date: 04/02/24  -LR Placement Time: 1945  -LR Present on Original Admission: Y  -LR  Side: Right  -LR Location: gluteal  -LR Primary Wound Type: Pressure inj  -LR    Retired Wound - Properties Group Placement Date: 04/02/24  -LR Placement Time: 1945  -LR Present on Original Admission: Y  -LR Side: Right  -LR Location: gluteal  -LR Primary Wound Type: Pressure inj  -LR    Retired Wound - Properties Group Date first assessed: 04/02/24  -LR Time first assessed: 1945  -LR Present on Original Admission: Y  -LR Side: Right  -LR Location: gluteal  -LR Primary Wound Type: Pressure inj  -LR      Row Name             Wound 04/02/24 1945 Right head    Wound - Properties Group Placement Date: 04/02/24  -LR Placement Time: 1945  -LR Present on Original Admission: Y  -LR Side: Right  -LR Location: head  -LR    Retired Wound - Properties Group Placement Date: 04/02/24  -LR Placement Time: 1945  -LR Present on Original Admission: Y  -LR Side: Right  -LR Location: head  -LR    Retired Wound - Properties Group Date first assessed: 04/02/24  -LR Time first assessed: 1945  -LR Present on Original Admission: Y  -LR Side: Right  -LR Location: head  -LR      Row Name             Wound 04/02/24 1945 Right anterior groin    Wound - Properties Group Placement Date: 04/02/24  -LR Placement Time: 1945  -LR Present on Original Admission: Y  -LR Side: Right  -LR Orientation: anterior  -LR Location: groin  -LR    Retired Wound - Properties Group Placement Date: 04/02/24  -LR Placement Time: 1945  -LR Present on Original Admission: Y  -LR Side: Right  -LR Orientation: anterior  -LR Location: groin  -LR    Retired Wound - Properties Group Date first assessed: 04/02/24  -LR Time first assessed: 1945  -LR Present on Original Admission: Y  -LR Side: Right  -LR Location: groin  -LR      Row Name             Wound 04/02/24 1945 umbilical area    Wound - Properties Group Placement Date: 04/02/24  -LR Placement Time: 1945  -LR Present on Original Admission: Y  -LR Location: umbilical area  -LR    Retired Wound - Properties Group Placement  Date: 04/02/24  -LR Placement Time: 1945  -LR Present on Original Admission: Y  -LR Location: umbilical area  -LR    Retired Wound - Properties Group Date first assessed: 04/02/24  -LR Time first assessed: 1945  -LR Present on Original Admission: Y  -LR Location: umbilical area  -LR      Row Name 04/04/24 5846          Plan of Care Review    Plan of Care Reviewed With patient  -LINDA     Progress improving  -LINDA       Row Name 04/04/24 8340          Positioning and Restraints    Pre-Treatment Position in bed  -LINDA     Post Treatment Position chair  -LINDA     In Bed supine;call light within reach;exit alarm on  -LINDA     In Chair sitting;call light within reach;encouraged to call for assist;exit alarm on  -LINDA       Row Name 04/04/24 0468          Progress Summary (PT)    Progress Toward Functional Goals (PT) progress toward functional goals is good  -LINDA     Daily Progress Summary (PT) Patient was able to ambulate significantly further today with minimal assistance. Patient needed assistance to guide the walker in the correct direction during ambulation. Skilled therapy is needed at this time to continue to address impairments.  -LINDA     Barriers to Overall Progress (PT) No barriers identified.  -LINDA               User Key  (r) = Recorded By, (t) = Taken By, (c) = Cosigned By      Initials Name Provider Type    LINDA Johnathan Lazaro, PT Physical Therapist    LR Rigoberto Sharpe, RN Registered Nurse                      PT Recommendation and Plan     Progress Summary (PT)  Progress Toward Functional Goals (PT): progress toward functional goals is good  Daily Progress Summary (PT): Patient was able to ambulate significantly further today with minimal assistance. Patient needed assistance to guide the walker in the correct direction during ambulation. Skilled therapy is needed at this time to continue to address impairments.  Barriers to Overall Progress (PT): No barriers identified.  Plan of Care Reviewed With: patient  Progress:  improving   Outcome Measures       Row Name 04/04/24 1400 04/03/24 1300          How much help from another person do you currently need...    Turning from your back to your side while in flat bed without using bedrails? 3  -LINDA 2  -CS     Moving from lying on back to sitting on the side of a flat bed without bedrails? 3  -LINDA 2  -CS     Moving to and from a bed to a chair (including a wheelchair)? 3  -LINDA 3  -CS     Standing up from a chair using your arms (e.g., wheelchair, bedside chair)? 3  -LINDA 3  -CS     Climbing 3-5 steps with a railing? 2  -LINDA 2  -CS     To walk in hospital room? 3  -LINDA 3  -CS     AM-PAC 6 Clicks Score (PT) 17  -LINDA 15  -CS     Highest Level of Mobility Goal 5 --> Static standing  -LINDA 4 --> Transfer to chair/commode  -CS        Functional Assessment    Outcome Measure Options AM-PAC 6 Clicks Basic Mobility (PT)  -LINDA AM-PAC 6 Clicks Basic Mobility (PT)  -CS               User Key  (r) = Recorded By, (t) = Taken By, (c) = Cosigned By      Initials Name Provider Type    Johnathan Worrell, PT Physical Therapist    Pippa Stewart PT Physical Therapist                     Time Calculation:    PT Charges       Row Name 04/04/24 1355             Timed Charges    67678 - Gait Training Minutes  15  -LINDA         Total Minutes    Timed Charges Total Minutes 15  -LINDA       Total Minutes 15  -LINDA                User Key  (r) = Recorded By, (t) = Taken By, (c) = Cosigned By      Initials Name Provider Type    Johnathan Worrell PT Physical Therapist                  Therapy Charges for Today       Code Description Service Date Service Provider Modifiers Qty    [id number] HC GAIT TRAINING EA 15 MIN 4/4/2024 Johnathan Lazaro, PT GP 1            PT G-Codes  Outcome Measure Options: AM-PAC 6 Clicks Basic Mobility (PT)  AM-PAC 6 Clicks Score (PT): 17  AM-PAC 6 Clicks Score (OT): 19    Johnathan Lazaro PT  4/4/2024

## 2024-04-05 ENCOUNTER — TELEPHONE (OUTPATIENT)
Dept: CASE MANAGEMENT | Facility: OTHER | Age: 74
End: 2024-04-05
Payer: MEDICARE

## 2024-04-05 ENCOUNTER — TRANSITIONAL CARE MANAGEMENT TELEPHONE ENCOUNTER (OUTPATIENT)
Dept: CALL CENTER | Facility: HOSPITAL | Age: 74
End: 2024-04-05
Payer: MEDICARE

## 2024-04-05 NOTE — OUTREACH NOTE
Call Center TCM Note      Flowsheet Row Responses   [facility] patient discharged from? [facility]   Does the patient have one of the following disease processes/diagnoses(primary or secondary)? Other   TCM attempt successful? No  [verbal release for Kyler Hill]   Unsuccessful attempts Attempt 1  [attempted pt and son's]   Call Status Left message            Katalina Go RN    4/5/2024, 09:02 EDT

## 2024-04-06 ENCOUNTER — TRANSITIONAL CARE MANAGEMENT TELEPHONE ENCOUNTER (OUTPATIENT)
Dept: CALL CENTER | Facility: HOSPITAL | Age: 74
End: 2024-04-06
Payer: MEDICARE

## 2024-04-06 NOTE — OUTREACH NOTE
Call Center TCM Note      Flowsheet Row Responses   [facility] patient discharged from? [facility]   Does the patient have one of the following disease processes/diagnoses(primary or secondary)? Other   TCM attempt successful? No   Unsuccessful attempts Attempt 3  [Attempted to reach both sons as patient is nonverbal. NO answer. Patient has a PC HFU appt in place for 4/12/24  1045am]            Cee Jiménez RN    4/6/2024, 09:19 EDT

## 2024-04-12 ENCOUNTER — TELEPHONE (OUTPATIENT)
Dept: CASE MANAGEMENT | Facility: OTHER | Age: 74
End: 2024-04-12
Payer: MEDICARE

## 2024-04-26 ENCOUNTER — TELEPHONE (OUTPATIENT)
Dept: CASE MANAGEMENT | Facility: OTHER | Age: 74
End: 2024-04-26
Payer: MEDICARE

## 2024-04-26 NOTE — TELEPHONE ENCOUNTER
[employer] message sent to Charis. Left message on both sons voicemail to return my call. Letter mailed to home address.

## 2024-05-14 ENCOUNTER — TELEPHONE (OUTPATIENT)
Dept: CASE MANAGEMENT | Facility: OTHER | Age: 74
End: 2024-05-14
Payer: MEDICARE

## 2024-05-14 NOTE — TELEPHONE ENCOUNTER
I have not been able to contact Ms Hill since 11/1/23. I have sent multiple [employer] messages, left voicemails on all phone numbers listed and sent letter to home address.  No show to last PCP visit.

## 2024-05-23 DIAGNOSIS — I10 HYPERTENSION, UNSPECIFIED TYPE: ICD-10-CM

## 2024-05-23 DIAGNOSIS — I15.8 OTHER SECONDARY HYPERTENSION: ICD-10-CM

## 2024-05-23 RX ORDER — BENAZEPRIL HYDROCHLORIDE 40 MG/1
40 TABLET ORAL DAILY
Qty: 90 TABLET | Refills: 3 | Status: SHIPPED | OUTPATIENT
Start: 2024-05-23

## 2024-05-23 RX ORDER — CLONIDINE HYDROCHLORIDE 0.1 MG/1
0.1 TABLET ORAL 2 TIMES DAILY
Qty: 180 TABLET | Refills: 3 | Status: SHIPPED | OUTPATIENT
Start: 2024-05-23

## 2024-05-23 RX ORDER — AMLODIPINE BESYLATE 10 MG/1
10 TABLET ORAL DAILY
Qty: 90 TABLET | Refills: 3 | Status: SHIPPED | OUTPATIENT
Start: 2024-05-23

## 2024-06-09 ENCOUNTER — APPOINTMENT (OUTPATIENT)
Dept: GENERAL RADIOLOGY | Facility: HOSPITAL | Age: 74
End: 2024-06-09
Payer: MEDICARE

## 2024-06-09 ENCOUNTER — APPOINTMENT (OUTPATIENT)
Dept: CT IMAGING | Facility: HOSPITAL | Age: 74
End: 2024-06-09
Payer: MEDICARE

## 2024-06-09 ENCOUNTER — HOSPITAL ENCOUNTER (INPATIENT)
Facility: HOSPITAL | Age: 74
LOS: 4 days | Discharge: NURSING, REHAB OR LONG-TERM CARE | End: 2024-06-13
Attending: EMERGENCY MEDICINE | Admitting: FAMILY MEDICINE
Payer: MEDICARE

## 2024-06-09 DIAGNOSIS — R26.2 DIFFICULTY WALKING: ICD-10-CM

## 2024-06-09 DIAGNOSIS — N39.0 ACUTE UTI: Primary | ICD-10-CM

## 2024-06-09 DIAGNOSIS — I15.8 OTHER SECONDARY HYPERTENSION: ICD-10-CM

## 2024-06-09 DIAGNOSIS — I10 HYPERTENSION, UNSPECIFIED TYPE: ICD-10-CM

## 2024-06-09 PROBLEM — R53.1 GENERALIZED WEAKNESS: Status: ACTIVE | Noted: 2024-06-09

## 2024-06-09 PROBLEM — R41.82 ALTERED MENTAL STATUS: Status: ACTIVE | Noted: 2024-06-09

## 2024-06-09 LAB
ALBUMIN SERPL-MCNC: 3.8 G/DL (ref 3.5–5.2)
ALBUMIN/GLOB SERPL: 1.1 G/DL
ALP SERPL-CCNC: 110 U/L (ref 39–117)
ALT SERPL W P-5'-P-CCNC: 18 U/L (ref 1–33)
ANION GAP SERPL CALCULATED.3IONS-SCNC: 22.2 MMOL/L (ref 5–15)
AST SERPL-CCNC: 27 U/L (ref 1–32)
BACTERIA UR QL AUTO: ABNORMAL /HPF
BASOPHILS # BLD AUTO: 0.02 10*3/MM3 (ref 0–0.2)
BASOPHILS NFR BLD AUTO: 0.1 % (ref 0–1.5)
BILIRUB SERPL-MCNC: 0.8 MG/DL (ref 0–1.2)
BILIRUB UR QL STRIP: NEGATIVE
BUN SERPL-MCNC: 21 MG/DL (ref 8–23)
BUN/CREAT SERPL: 18.1 (ref 7–25)
CALCIUM SPEC-SCNC: 9.8 MG/DL (ref 8.6–10.5)
CHLORIDE SERPL-SCNC: 98 MMOL/L (ref 98–107)
CK SERPL-CCNC: 540 U/L (ref 20–180)
CLARITY UR: ABNORMAL
CO2 SERPL-SCNC: 22.8 MMOL/L (ref 22–29)
COLOR UR: YELLOW
CREAT SERPL-MCNC: 1.16 MG/DL (ref 0.57–1)
D-LACTATE SERPL-SCNC: 2.7 MMOL/L (ref 0.5–2)
D-LACTATE SERPL-SCNC: 2.8 MMOL/L (ref 0.5–2)
D-LACTATE SERPL-SCNC: 3.7 MMOL/L (ref 0.5–2)
DEPRECATED RDW RBC AUTO: 48.8 FL (ref 37–54)
EGFRCR SERPLBLD CKD-EPI 2021: 49.9 ML/MIN/1.73
EOSINOPHIL # BLD AUTO: 0 10*3/MM3 (ref 0–0.4)
EOSINOPHIL NFR BLD AUTO: 0 % (ref 0.3–6.2)
ERYTHROCYTE [DISTWIDTH] IN BLOOD BY AUTOMATED COUNT: 16 % (ref 12.3–15.4)
GEN 5 2HR TROPONIN T REFLEX: 30 NG/L
GLOBULIN UR ELPH-MCNC: 3.6 GM/DL
GLUCOSE BLDC GLUCOMTR-MCNC: 336 MG/DL (ref 70–99)
GLUCOSE BLDC GLUCOMTR-MCNC: 373 MG/DL (ref 70–99)
GLUCOSE BLDC GLUCOMTR-MCNC: 425 MG/DL (ref 70–99)
GLUCOSE SERPL-MCNC: 442 MG/DL (ref 65–99)
GLUCOSE UR STRIP-MCNC: ABNORMAL MG/DL
HCT VFR BLD AUTO: 51.2 % (ref 34–46.6)
HGB BLD-MCNC: 16.7 G/DL (ref 12–15.9)
HGB UR QL STRIP.AUTO: ABNORMAL
HOLD SPECIMEN: NORMAL
HOLD SPECIMEN: NORMAL
HYALINE CASTS UR QL AUTO: ABNORMAL /LPF
IMM GRANULOCYTES # BLD AUTO: 0.06 10*3/MM3 (ref 0–0.05)
IMM GRANULOCYTES NFR BLD AUTO: 0.4 % (ref 0–0.5)
KETONES UR QL STRIP: ABNORMAL
LEUKOCYTE ESTERASE UR QL STRIP.AUTO: ABNORMAL
LIPASE SERPL-CCNC: 18 U/L (ref 13–60)
LYMPHOCYTES # BLD AUTO: 1.78 10*3/MM3 (ref 0.7–3.1)
LYMPHOCYTES NFR BLD AUTO: 12.7 % (ref 19.6–45.3)
MAGNESIUM SERPL-MCNC: 2.3 MG/DL (ref 1.6–2.4)
MCH RBC QN AUTO: 28.2 PG (ref 26.6–33)
MCHC RBC AUTO-ENTMCNC: 32.6 G/DL (ref 31.5–35.7)
MCV RBC AUTO: 86.3 FL (ref 79–97)
MONOCYTES # BLD AUTO: 0.83 10*3/MM3 (ref 0.1–0.9)
MONOCYTES NFR BLD AUTO: 5.9 % (ref 5–12)
NEUTROPHILS NFR BLD AUTO: 11.34 10*3/MM3 (ref 1.7–7)
NEUTROPHILS NFR BLD AUTO: 80.9 % (ref 42.7–76)
NITRITE UR QL STRIP: NEGATIVE
NRBC BLD AUTO-RTO: 0 /100 WBC (ref 0–0.2)
PH UR STRIP.AUTO: 5.5 [PH] (ref 5–8)
PLATELET # BLD AUTO: 347 10*3/MM3 (ref 140–450)
PMV BLD AUTO: 12.8 FL (ref 6–12)
POTASSIUM SERPL-SCNC: 3.6 MMOL/L (ref 3.5–5.2)
PROT SERPL-MCNC: 7.4 G/DL (ref 6–8.5)
PROT UR QL STRIP: ABNORMAL
RBC # BLD AUTO: 5.93 10*6/MM3 (ref 3.77–5.28)
RBC # UR STRIP: ABNORMAL /HPF
REF LAB TEST METHOD: ABNORMAL
SODIUM SERPL-SCNC: 143 MMOL/L (ref 136–145)
SP GR UR STRIP: >1.03 (ref 1–1.03)
SQUAMOUS #/AREA URNS HPF: ABNORMAL /HPF
TROPONIN T DELTA: -2 NG/L
TROPONIN T SERPL HS-MCNC: 32 NG/L
UROBILINOGEN UR QL STRIP: ABNORMAL
WBC # UR STRIP: ABNORMAL /HPF
WBC NRBC COR # BLD AUTO: 14.03 10*3/MM3 (ref 3.4–10.8)
WHOLE BLOOD HOLD COAG: NORMAL
WHOLE BLOOD HOLD SPECIMEN: NORMAL
WHOLE BLOOD HOLD SPECIMEN: NORMAL
YEAST URNS QL MICRO: ABNORMAL /HPF

## 2024-06-09 PROCEDURE — 25010000002 HEPARIN (PORCINE) PER 1000 UNITS: Performed by: FAMILY MEDICINE

## 2024-06-09 PROCEDURE — 83735 ASSAY OF MAGNESIUM: CPT | Performed by: EMERGENCY MEDICINE

## 2024-06-09 PROCEDURE — 83036 HEMOGLOBIN GLYCOSYLATED A1C: CPT | Performed by: FAMILY MEDICINE

## 2024-06-09 PROCEDURE — 25010000002 CEFTRIAXONE PER 250 MG: Performed by: EMERGENCY MEDICINE

## 2024-06-09 PROCEDURE — 87154 CUL TYP ID BLD PTHGN 6+ TRGT: CPT | Performed by: EMERGENCY MEDICINE

## 2024-06-09 PROCEDURE — 36415 COLL VENOUS BLD VENIPUNCTURE: CPT | Performed by: EMERGENCY MEDICINE

## 2024-06-09 PROCEDURE — 25810000003 LACTATED RINGERS PER 1000 ML: Performed by: FAMILY MEDICINE

## 2024-06-09 PROCEDURE — 93005 ELECTROCARDIOGRAM TRACING: CPT | Performed by: EMERGENCY MEDICINE

## 2024-06-09 PROCEDURE — 87040 BLOOD CULTURE FOR BACTERIA: CPT | Performed by: EMERGENCY MEDICINE

## 2024-06-09 PROCEDURE — 93010 ELECTROCARDIOGRAM REPORT: CPT | Performed by: INTERNAL MEDICINE

## 2024-06-09 PROCEDURE — 99223 1ST HOSP IP/OBS HIGH 75: CPT | Performed by: FAMILY MEDICINE

## 2024-06-09 PROCEDURE — 88312 SPECIAL STAINS GROUP 1: CPT | Performed by: EMERGENCY MEDICINE

## 2024-06-09 PROCEDURE — 83605 ASSAY OF LACTIC ACID: CPT | Performed by: EMERGENCY MEDICINE

## 2024-06-09 PROCEDURE — 87147 CULTURE TYPE IMMUNOLOGIC: CPT | Performed by: EMERGENCY MEDICINE

## 2024-06-09 PROCEDURE — 71045 X-RAY EXAM CHEST 1 VIEW: CPT

## 2024-06-09 PROCEDURE — 99291 CRITICAL CARE FIRST HOUR: CPT

## 2024-06-09 PROCEDURE — 82948 REAGENT STRIP/BLOOD GLUCOSE: CPT

## 2024-06-09 PROCEDURE — 63710000001 INSULIN LISPRO (HUMAN) PER 5 UNITS: Performed by: FAMILY MEDICINE

## 2024-06-09 PROCEDURE — P9612 CATHETERIZE FOR URINE SPEC: HCPCS

## 2024-06-09 PROCEDURE — 63710000001 INSULIN REGULAR HUMAN PER 5 UNITS: Performed by: EMERGENCY MEDICINE

## 2024-06-09 PROCEDURE — 63710000001 INSULIN GLARGINE PER 5 UNITS: Performed by: FAMILY MEDICINE

## 2024-06-09 PROCEDURE — 84484 ASSAY OF TROPONIN QUANT: CPT | Performed by: EMERGENCY MEDICINE

## 2024-06-09 PROCEDURE — 82550 ASSAY OF CK (CPK): CPT | Performed by: EMERGENCY MEDICINE

## 2024-06-09 PROCEDURE — 80053 COMPREHEN METABOLIC PANEL: CPT | Performed by: EMERGENCY MEDICINE

## 2024-06-09 PROCEDURE — 81001 URINALYSIS AUTO W/SCOPE: CPT | Performed by: EMERGENCY MEDICINE

## 2024-06-09 PROCEDURE — 70450 CT HEAD/BRAIN W/O DYE: CPT

## 2024-06-09 PROCEDURE — 36415 COLL VENOUS BLD VENIPUNCTURE: CPT

## 2024-06-09 PROCEDURE — 87086 URINE CULTURE/COLONY COUNT: CPT | Performed by: EMERGENCY MEDICINE

## 2024-06-09 PROCEDURE — 83690 ASSAY OF LIPASE: CPT | Performed by: EMERGENCY MEDICINE

## 2024-06-09 PROCEDURE — 82948 REAGENT STRIP/BLOOD GLUCOSE: CPT | Performed by: EMERGENCY MEDICINE

## 2024-06-09 PROCEDURE — 85025 COMPLETE CBC W/AUTO DIFF WBC: CPT | Performed by: EMERGENCY MEDICINE

## 2024-06-09 RX ORDER — INSULIN LISPRO 100 [IU]/ML
3-14 INJECTION, SOLUTION INTRAVENOUS; SUBCUTANEOUS
Status: DISCONTINUED | OUTPATIENT
Start: 2024-06-09 | End: 2024-06-13 | Stop reason: HOSPADM

## 2024-06-09 RX ORDER — SODIUM CHLORIDE, SODIUM LACTATE, POTASSIUM CHLORIDE, CALCIUM CHLORIDE 600; 310; 30; 20 MG/100ML; MG/100ML; MG/100ML; MG/100ML
100 INJECTION, SOLUTION INTRAVENOUS CONTINUOUS
Status: ACTIVE | OUTPATIENT
Start: 2024-06-10 | End: 2024-06-10

## 2024-06-09 RX ORDER — POLYETHYLENE GLYCOL 3350 17 G/17G
17 POWDER, FOR SOLUTION ORAL DAILY PRN
Status: DISCONTINUED | OUTPATIENT
Start: 2024-06-09 | End: 2024-06-13 | Stop reason: HOSPADM

## 2024-06-09 RX ORDER — BISACODYL 5 MG/1
5 TABLET, DELAYED RELEASE ORAL DAILY PRN
Status: DISCONTINUED | OUTPATIENT
Start: 2024-06-09 | End: 2024-06-13 | Stop reason: HOSPADM

## 2024-06-09 RX ORDER — DEXTROSE MONOHYDRATE 25 G/50ML
25 INJECTION, SOLUTION INTRAVENOUS
Status: DISCONTINUED | OUTPATIENT
Start: 2024-06-09 | End: 2024-06-13 | Stop reason: HOSPADM

## 2024-06-09 RX ORDER — HEPARIN SODIUM 5000 [USP'U]/ML
5000 INJECTION, SOLUTION INTRAVENOUS; SUBCUTANEOUS EVERY 12 HOURS SCHEDULED
Status: DISCONTINUED | OUTPATIENT
Start: 2024-06-09 | End: 2024-06-13 | Stop reason: HOSPADM

## 2024-06-09 RX ORDER — BISACODYL 10 MG
10 SUPPOSITORY, RECTAL RECTAL DAILY PRN
Status: DISCONTINUED | OUTPATIENT
Start: 2024-06-09 | End: 2024-06-13 | Stop reason: HOSPADM

## 2024-06-09 RX ORDER — AMOXICILLIN 250 MG
2 CAPSULE ORAL 2 TIMES DAILY PRN
Status: DISCONTINUED | OUTPATIENT
Start: 2024-06-09 | End: 2024-06-13 | Stop reason: HOSPADM

## 2024-06-09 RX ORDER — SODIUM CHLORIDE 0.9 % (FLUSH) 0.9 %
10 SYRINGE (ML) INJECTION EVERY 12 HOURS SCHEDULED
Status: DISCONTINUED | OUTPATIENT
Start: 2024-06-09 | End: 2024-06-13 | Stop reason: HOSPADM

## 2024-06-09 RX ORDER — SACCHAROMYCES BOULARDII 250 MG
250 CAPSULE ORAL 2 TIMES DAILY
Status: DISCONTINUED | OUTPATIENT
Start: 2024-06-09 | End: 2024-06-13 | Stop reason: HOSPADM

## 2024-06-09 RX ORDER — SODIUM CHLORIDE 0.9 % (FLUSH) 0.9 %
10 SYRINGE (ML) INJECTION AS NEEDED
Status: DISCONTINUED | OUTPATIENT
Start: 2024-06-09 | End: 2024-06-13 | Stop reason: HOSPADM

## 2024-06-09 RX ORDER — SODIUM CHLORIDE 9 MG/ML
40 INJECTION, SOLUTION INTRAVENOUS AS NEEDED
Status: DISCONTINUED | OUTPATIENT
Start: 2024-06-09 | End: 2024-06-13 | Stop reason: HOSPADM

## 2024-06-09 RX ORDER — SODIUM CHLORIDE, SODIUM LACTATE, POTASSIUM CHLORIDE, CALCIUM CHLORIDE 600; 310; 30; 20 MG/100ML; MG/100ML; MG/100ML; MG/100ML
100 INJECTION, SOLUTION INTRAVENOUS CONTINUOUS
Status: DISCONTINUED | OUTPATIENT
Start: 2024-06-09 | End: 2024-06-09

## 2024-06-09 RX ORDER — NICOTINE POLACRILEX 4 MG
15 LOZENGE BUCCAL
Status: DISCONTINUED | OUTPATIENT
Start: 2024-06-09 | End: 2024-06-13 | Stop reason: HOSPADM

## 2024-06-09 RX ORDER — ONDANSETRON 2 MG/ML
4 INJECTION INTRAMUSCULAR; INTRAVENOUS EVERY 6 HOURS PRN
Status: DISCONTINUED | OUTPATIENT
Start: 2024-06-09 | End: 2024-06-13 | Stop reason: HOSPADM

## 2024-06-09 RX ORDER — IBUPROFEN 600 MG/1
1 TABLET ORAL
Status: DISCONTINUED | OUTPATIENT
Start: 2024-06-09 | End: 2024-06-13 | Stop reason: HOSPADM

## 2024-06-09 RX ADMIN — SODIUM CHLORIDE, POTASSIUM CHLORIDE, SODIUM LACTATE AND CALCIUM CHLORIDE 100 ML/HR: 600; 310; 30; 20 INJECTION, SOLUTION INTRAVENOUS at 18:47

## 2024-06-09 RX ADMIN — HEPARIN SODIUM 5000 UNITS: 5000 INJECTION INTRAVENOUS; SUBCUTANEOUS at 21:09

## 2024-06-09 RX ADMIN — SODIUM CHLORIDE 1000 ML: 9 INJECTION, SOLUTION INTRAVENOUS at 14:32

## 2024-06-09 RX ADMIN — CEFTRIAXONE SODIUM 1000 MG: 1 INJECTION, POWDER, FOR SOLUTION INTRAMUSCULAR; INTRAVENOUS at 14:31

## 2024-06-09 RX ADMIN — CEFTRIAXONE SODIUM 1000 MG: 1 INJECTION, POWDER, FOR SOLUTION INTRAMUSCULAR; INTRAVENOUS at 16:54

## 2024-06-09 RX ADMIN — Medication 10 ML: at 21:09

## 2024-06-09 RX ADMIN — INSULIN LISPRO 10 UNITS: 100 INJECTION, SOLUTION INTRAVENOUS; SUBCUTANEOUS at 21:08

## 2024-06-09 RX ADMIN — INSULIN GLARGINE 20 UNITS: 100 INJECTION, SOLUTION SUBCUTANEOUS at 21:08

## 2024-06-09 RX ADMIN — INSULIN HUMAN 7 UNITS: 100 INJECTION, SOLUTION PARENTERAL at 15:56

## 2024-06-09 NOTE — ED NOTES
Patient arrived via [facility], reportedly family member called EMS dt finding patient in the floor. Patient has been in the floor since Friday per [facility] and family member. Patient at this time responsive to painful stimuli, eyes closed, and several areas of bruising and redness. VITALS WNL per EMS. No thinners per EMS.

## 2024-06-09 NOTE — ED NOTES
Inserted dong catheter and output is cloudy yellow. No patient reaction to painful stimuli. Unable to speak or communicate her needs.

## 2024-06-09 NOTE — H&P
[facility]   HISTORY AND PHYSICAL    Patient Name: Charis Hill  : 1950  MRN: [id number]  Primary Care Physician:  Jaime Quintanilla MD  Date of admission: 2024    Subjective   Subjective     Chief Complaint: Altered mental status    HPI:    Charis Hill is a 73 y.o. female with past medical history of diabetes, hypertension, hyperlipidemia, advanced dementia, aphasia, GERD presented to the ED via EMS for evaluation of altered mental status.  When seen patient was nonverbal so history was taken via physician handoff and chart review.  Patient was found down by family member prior to arrival and as per family he was last seen on Friday.  Patient was slightly down since then.  Patient had several areas of bruising and redness including periorbital's, hip, elbows, coccyx and lower extremities.  In the ED patient's vitals were within normal limits on arrival.  Labs showed that she had an elevated CK level, elevated lactic acid, leukocytosis, hyperglycemia, and urinalysis positive for UTI.  CT of the head was negative for any acute findings as well as a chest x-ray.  Patient admitted for further evaluation and treatment.    Review of Systems   Patient nonverbal    Personal History     Past Medical History:   Diagnosis Date    Advance directive declined by patient 2020    Aphagia 2019    Diabetes mellitus, type II     Glaucoma     Hyperlipidemia     Hypertension     Insulin dependent type 2 diabetes mellitus, uncontrolled 2020    Medication management 2020       History reviewed. No pertinent surgical history.    Family History: family history includes Diabetes (age of onset: 63) in her father; Heart disease in her mother; Other in her mother. Otherwise pertinent FHx was reviewed and not pertinent to current issue.    Social History:  reports that she has never smoked. She has never used smokeless tobacco. She reports that she does not drink alcohol and does not use  drugs.    Home Medications:  Insulin Glargine, Insulin Lispro (0.5 Unit Dial), Semaglutide(0.25 or 0.5MG/DOS), amLODIPine, aspirin, benazepril, cloNIDine, latanoprost, metFORMIN ER, omeprazole, and rosuvastatin      Allergies:  No Known Allergies    Objective   Objective     Vitals:   Temp:  [97.6 °F (36.4 °C)] 97.6 °F (36.4 °C)  Heart Rate:  [] 88  Resp:  [16-18] 16  BP: (127-143)/(62-82) 141/74  Physical Exam    Constitutional: Frail, ill-appearing, nonverbal   Eyes: PERRLA, sclerae anicteric, no conjunctival injection   HENT: NCAT, dry mucous membrane   Neck: Supple, no thyromegaly, no lymphadenopathy, trachea midline   Respiratory: Clear to auscultation bilaterally, nonlabored respirations    Cardiovascular: RRR, no murmurs, rubs, or gallops, palpable pedal pulses bilaterally   Gastrointestinal: Positive bowel sounds, soft, nontender, nondistended   Musculoskeletal: No bilateral ankle edema, no clubbing or cyanosis to extremities   Psychiatric: Nonverbal, unable to evaluate   Neurologic: Nonverbal, not following commands, advanced dementia, pupils reactive    Skin: Bruising throughout    Result Review    Result Review:  I have personally reviewed the results from the time of this admission to 6/9/2024 18:11 EDT and agree with these findings:  [x]  Laboratory list / accordion  []  Microbiology  [x]  Radiology  [x]  EKG/Telemetry   []  Cardiology/Vascular   []  Pathology  []  Old records  []  Other:  Most notable findings include: Leukocytosis, elevated Actiq acid, UTI, elevated CK, chest x-ray and CT head negative      Assessment & Plan   Assessment / Plan     Brief Patient Summary:  Charis Hill is a 73 y.o. female with past medical history of diabetes, hypertension, hyperlipidemia, advanced dementia, aphasia, GERD presented to the ED via EMS for evaluation of altered mental status    Active Hospital Problems:  Active Hospital Problems    Diagnosis     **Generalized weakness     Altered mental status      Acute UTI     Speech disturbance     Hypertension     Hyperlipidemia     Advance directive declined by patient     Diabetes mellitus, type II, insulin dependent      Plan:     AMS/generalized weakness  -Admit to medical floor  -Found down at home by family  -UTI noted, will treat  -Patient in rhabdo  -Fall precautions  -IVF  -PT OT  -Supportive care    UTI  -UA reviewed  -Empiric antibiotics  -Urine, blood cultures  -IVF  -Supportive care    Diabetes  -Insulin sliding scale  -Levemir at bedtime  -Titrate as needed    HTN  -Currently well controlled  -PRN BP meds  -Resume home meds when available  -Titrate if needed    Advanced dementia    GI ppx  DVT ppx        CODE STATUS:    Level Of Support Discussed With: Patient; Health Care Surrogate  Code Status (Patient has no pulse and is not breathing): CPR (Attempt to Resuscitate)  Medical Interventions (Patient has pulse or is breathing): Full Support    Admission Status:  I believe this patient meets inpatient status.      Electronically signed by Arvind Jenkins MD, 06/09/24, 6:11 PM EDT.

## 2024-06-09 NOTE — SIGNIFICANT NOTE
06/09/24 1504   Plan   Final Note SW completed APS report [id number] due to concerns reported by family for pts ability to care for herself independently. Pt has reportedly had mulitple falls and cannot communicate verbally at baseline due to prior strokes. APS is currently involved from prior reports as well per family.

## 2024-06-09 NOTE — ED PROVIDER NOTES
Time: 2:09 PM EDT  Date of encounter:  6/9/2024  Independent Historian/Clinical History and Information was obtained by:   Nursing Staff    History is limited by: Altered Mental Status    Chief Complaint: Altered mental status      History of Present Illness:  Patient is a 73 y.o. year old female who presents to the emergency department for evaluation of altered mental status.  The patient was found down and has been down most likely since Friday.    HPI    Patient Care Team  Primary Care Provider: Jaime Quintanilla MD    Past Medical History:     No Known Allergies  Past Medical History:   Diagnosis Date    Advance directive declined by patient 02/26/2020    Aphagia 08/14/2019    Diabetes mellitus, type II     Glaucoma     Hyperlipidemia     Hypertension     Insulin dependent type 2 diabetes mellitus, uncontrolled 02/26/2020    Medication management 02/26/2020     History reviewed. No pertinent surgical history.  Family History   Problem Relation Age of Onset    Other Mother         CVA (Cerebrovascular accident)    Heart disease Mother     Diabetes Father 63       Home Medications:  Prior to Admission medications    Medication Sig Start Date End Date Taking? Authorizing Provider   amLODIPine (NORVASC) 10 MG tablet Take 1 tablet by mouth Daily. 5/23/24   Jaime Quintanilla MD   aspirin 81 MG EC tablet Take 1 tablet by mouth Daily.    Provider, MD Rosalio   benazepril (LOTENSIN) 40 MG tablet TAKE 1 TABLET DAILY 5/23/24   Jaime Quintanilla MD   cloNIDine (CATAPRES) 0.1 MG tablet Take 1 tablet by mouth 2 (Two) Times a Day. 5/23/24   Jaime Quintanilla MD   Insulin Glargine (Lantus SoloStar) 100 UNIT/ML injection pen INJECT 28 UNITS UNDER THE SKIN INTO THE APPROPRIATE AREA DAILY AS DIRECTED EVERY NIGHT  Patient taking differently: Inject 28 Units under the skin into the appropriate area as directed Every Night. 12/13/23   Jaime Quintanilla MD   Insulin Lispro, 0.5 Unit Dial, (HUMALOG)  "100 UNIT/ML solution pen-injector Inject 3 Units under the skin into the appropriate area as directed 3 (Three) Times a Day Before Meals for 90 days. 10/30/23 1/28/24  Jaime Quintanilla MD   latanoprost (XALATAN) 0.005 % ophthalmic solution Apply 1 drop to eye(s) as directed by provider Daily. 1/18/22   ProviderRosalio MD   metFORMIN ER (GLUCOPHAGE-XR) 500 MG 24 hr tablet Take 2 tablets by mouth Daily With Breakfast. 11/29/23   Jaime Quintanilla MD   omeprazole (priLOSEC) 40 MG capsule Take 1 capsule by mouth Daily. 5/9/23   Redd Magdaleno III, MD   rosuvastatin (CRESTOR) 5 MG tablet TAKE 1 TABLET DAILY 1/15/24   Jaime Quintanilla MD   Semaglutide,0.25 or 0.5MG/DOS, (OZEMPIC) 2 MG/3ML solution pen-injector Inject 0.5 mg under the skin into the appropriate area as directed 1 (One) Time Per Week. 11/6/23   Jaime Quintanilla MD        Social History:   Social History     Tobacco Use    Smoking status: Never    Smokeless tobacco: Never   Vaping Use    Vaping status: Never Used   Substance Use Topics    Alcohol use: Never    Drug use: Never         Review of Systems:  Review of Systems   Unable to perform ROS: Mental status change        Physical Exam:  /63 (BP Location: Left arm, Patient Position: Lying)   Pulse 93   Temp 98 °F (36.7 °C) (Oral)   Resp 14   Ht 167.6 cm (65.98\")   Wt 70.8 kg (156 lb 1.4 oz)   SpO2 99%   BMI 25.21 kg/m²     Physical Exam  Vitals and nursing note reviewed.   Constitutional:       General: She is not in acute distress.     Appearance: Normal appearance. She is ill-appearing. She is not toxic-appearing.   HENT:      Head: Normocephalic and atraumatic.      Jaw: There is normal jaw occlusion.   Eyes:      General: Lids are normal.      Extraocular Movements: Extraocular movements intact.      Conjunctiva/sclera: Conjunctivae normal.      Pupils: Pupils are equal, round, and reactive to light.   Cardiovascular:      Rate and Rhythm: Normal rate and " regular rhythm.      Pulses: Normal pulses.      Heart sounds: Normal heart sounds.   Pulmonary:      Effort: Pulmonary effort is normal. No respiratory distress.      Breath sounds: Normal breath sounds. No wheezing or rhonchi.   Abdominal:      General: Abdomen is flat.      Palpations: Abdomen is soft.      Tenderness: There is no abdominal tenderness. There is no guarding or rebound.   Musculoskeletal:         General: Normal range of motion.      Cervical back: Normal range of motion and neck supple.      Right lower leg: No edema.      Left lower leg: No edema.   Skin:     General: Skin is warm and dry.   Neurological:      Mental Status: She is lethargic.   Psychiatric:         Mood and Affect: Mood normal.                  Procedures:  Procedures      Medical Decision Making:      Comorbidities that affect care:    Diabetes    External Notes reviewed:    Hospital Discharge Summary: Patient was last seen and admitted for acute kidney injury.      The following orders were placed and all results were independently analyzed by me:  Orders Placed This Encounter   Procedures    Blood Culture - Blood,    Blood Culture - Blood,    Urine Culture - Urine,    CT Head Without Contrast    XR Chest 1 View    [place] Draw    Urinalysis With Culture If Indicated -    CBC Auto Differential    Lactic Acid, Plasma    [place] Draw    Comprehensive Metabolic Panel    Urinalysis With Microscopic If Indicated (No Culture) - Urine, Clean Catch    Lipase    CK    Magnesium    High Sensitivity Troponin T    Urinalysis, Microscopic Only - Urine, Clean Catch    STAT Lactic Acid, Reflex    High Sensitivity Troponin T 2Hr    STAT Lactic Acid, Reflex    Undress & Gown    Vital Signs    Orthostatic Blood Pressure    Undress & Gown    Continuous Pulse Oximetry    Vital Signs    Code Status and Medical Interventions:    Inpatient Hospitalist Consult    Oxygen Therapy- Nasal Cannula; Titrate 1-6 LPM Per SpO2; 90 - 95%    POC Glucose Once     POC Glucose Once    ECG 12 Lead ED Triage Standing Order; Weak / Dizzy / AMS    ECG 12 Lead Chest Pain    Insert Peripheral IV    Insert Peripheral IV    Inpatient Admission    Fall Precautions    CBC & Differential    Lavender Top    Light Blue Top    Green Top (Gel)    Lavender Top    Gold Top - SST       Medications Given in the Emergency Department:  Medications   sodium chloride 0.9 % flush 10 mL (has no administration in time range)   sodium chloride 0.9 % flush 10 mL (has no administration in time range)   cefTRIAXone (ROCEPHIN) 1,000 mg in sodium chloride 0.9 % 100 mL IVPB-VTB (has no administration in time range)   saccharomyces boulardii (FLORASTOR) capsule 250 mg (has no administration in time range)   sodium chloride 0.9 % bolus 1,000 mL (0 mL Intravenous Stopped 6/9/24 1557)   cefTRIAXone (ROCEPHIN) 1,000 mg in sodium chloride 0.9 % 100 mL IVPB-VTB (0 mg Intravenous Stopped 6/9/24 1557)   insulin regular (humuLIN R,novoLIN R) injection 7 Units (7 Units Intravenous Given 6/9/24 1556)   cefTRIAXone (ROCEPHIN) 1,000 mg in sodium chloride 0.9 % 100 mL IVPB-VTB (0 mg Intravenous Stopped 6/9/24 1803)        ED Course:         Labs:    Lab Results (last 24 hours)       Procedure Component Value Units Date/Time    POC Glucose Once [[id number]]  (Abnormal) Collected: 06/09/24 1333    Specimen: Blood Updated: 06/09/24 1345     Glucose 425 mg/dL      Comment: Serial Number: [id number]:  [id number]       CBC & Differential [[id number]]  (Abnormal) Collected: 06/09/24 1338    Specimen: Blood from Arm, Left Updated: 06/09/24 1357    Narrative:      The following orders were created for panel order CBC & Differential.  Procedure                               Abnormality         Status                     ---------                               -----------         ------                     CBC Auto Differential[[id number]]        Abnormal            Final result                 Please view results for these tests  on the individual orders.    CBC Auto Differential [[id number]]  (Abnormal) Collected: 06/09/24 1338    Specimen: Blood from Arm, Left Updated: 06/09/24 1357     WBC 14.03 10*3/mm3      RBC 5.93 10*6/mm3      Hemoglobin 16.7 g/dL      Hematocrit 51.2 %      MCV 86.3 fL      MCH 28.2 pg      MCHC 32.6 g/dL      RDW 16.0 %      RDW-SD 48.8 fl      MPV 12.8 fL      Platelets 347 10*3/mm3      Neutrophil % 80.9 %      Lymphocyte % 12.7 %      Monocyte % 5.9 %      Eosinophil % 0.0 %      Basophil % 0.1 %      Immature Grans % 0.4 %      Neutrophils, Absolute 11.34 10*3/mm3      Lymphocytes, Absolute 1.78 10*3/mm3      Monocytes, Absolute 0.83 10*3/mm3      Eosinophils, Absolute 0.00 10*3/mm3      Basophils, Absolute 0.02 10*3/mm3      Immature Grans, Absolute 0.06 10*3/mm3      nRBC 0.0 /100 WBC     Lactic Acid, Plasma [[id number]]  (Abnormal) Collected: 06/09/24 1338    Specimen: Blood from Arm, Left Updated: 06/09/24 1424     Lactate 3.7 mmol/L     Blood Culture - Blood, Arm, Left [[id number]] Collected: 06/09/24 1338    Specimen: Blood from Arm, Left Updated: 06/09/24 1355    Blood Culture - Blood, Arm, Right [[id number]] Collected: 06/09/24 1338    Specimen: Blood from Arm, Right Updated: 06/09/24 1354    Urinalysis With Culture If Indicated - Straight Cath [[id number]]  (Abnormal) Collected: 06/09/24 1349    Specimen: Urine from Straight Cath Updated: 06/09/24 1414     Color, UA Yellow     Appearance, UA Turbid     pH, UA 5.5     Specific Gravity, UA >1.030     Glucose, UA >=1000 mg/dL (3+)     Ketones, UA 40 mg/dL (2+)     Bilirubin, UA Negative     Blood, UA Moderate (2+)     Protein, UA 30 mg/dL (1+)     Leuk Esterase, UA Moderate (2+)     Nitrite, UA Negative     Urobilinogen, UA 0.2 E.U./dL    Narrative:      In absence of clinical symptoms, the presence of pyuria, bacteria, and/or nitrites on the urinalysis result does not correlate with infection.    Urinalysis, Microscopic Only - Straight Cath [[id number]]   (Abnormal) Collected: 06/09/24 1349    Specimen: Urine from Straight Cath Updated: 06/09/24 1414     RBC, UA 3-5 /HPF      WBC, UA Too Numerous to Count /HPF      Bacteria, UA 4+ /HPF      Squamous Epithelial Cells, UA None Seen /HPF      Yeast, UA Moderate/2+ Budding Yeast /HPF      Hyaline Casts, UA None Seen /LPF      Methodology Manual Light Microscopy    Urine Culture - Urine, Straight Cath [[id number]] Collected: 06/09/24 1349    Specimen: Urine from Straight Cath Updated: 06/09/24 1414    Comprehensive Metabolic Panel [[id number]]  (Abnormal) Collected: 06/09/24 1415    Specimen: Blood from Arm, Left Updated: 06/09/24 1452     Glucose 442 mg/dL      BUN 21 mg/dL      Creatinine 1.16 mg/dL      Sodium 143 mmol/L      Potassium 3.6 mmol/L      Chloride 98 mmol/L      CO2 22.8 mmol/L      Calcium 9.8 mg/dL      Total Protein 7.4 g/dL      Albumin 3.8 g/dL      ALT (SGPT) 18 U/L      AST (SGOT) 27 U/L      Alkaline Phosphatase 110 U/L      Total Bilirubin 0.8 mg/dL      Globulin 3.6 gm/dL      A/G Ratio 1.1 g/dL      BUN/Creatinine Ratio 18.1     Anion Gap 22.2 mmol/L      eGFR 49.9 mL/min/1.73     Narrative:      GFR Normal >60  Chronic Kidney Disease <60  Kidney Failure <15    The GFR formula is only valid for adults with stable renal function between ages 18 and 70.    Lipase [[id number]]  (Normal) Collected: 06/09/24 1415    Specimen: Blood from Arm, Left Updated: 06/09/24 1442     Lipase 18 U/L     CK [[id number]]  (Abnormal) Collected: 06/09/24 1415    Specimen: Blood from Arm, Left Updated: 06/09/24 1442     Creatine Kinase 540 U/L     Magnesium [[id number]]  (Normal) Collected: 06/09/24 1415    Specimen: Blood from Arm, Left Updated: 06/09/24 1452     Magnesium 2.3 mg/dL     High Sensitivity Troponin T [[id number]]  (Abnormal) Collected: 06/09/24 1415    Specimen: Blood from Arm, Left Updated: 06/09/24 1442     HS Troponin T 32 ng/L     Narrative:      High Sensitive Troponin T Reference Range:  <14.0 ng/L-  Negative Female for AMI  <22.0 ng/L- Negative Male for AMI  >=14 - Abnormal Female indicating possible myocardial injury.  >=22 - Abnormal Male indicating possible myocardial injury.   Clinicians would have to utilize clinical acumen, EKG, Troponin, and serial changes to determine if it is an Acute Myocardial Infarction or myocardial injury due to an underlying chronic condition.         STAT Lactic Acid, Reflex [[id number]]  (Abnormal) Collected: 06/09/24 1602    Specimen: Blood Updated: 06/09/24 1631     Lactate 2.8 mmol/L     High Sensitivity Troponin T 2Hr [[id number]]  (Abnormal) Collected: 06/09/24 1602    Specimen: Blood Updated: 06/09/24 1624     HS Troponin T 30 ng/L      Troponin T Delta -2 ng/L     Narrative:      High Sensitive Troponin T Reference Range:  <14.0 ng/L- Negative Female for AMI  <22.0 ng/L- Negative Male for AMI  >=14 - Abnormal Female indicating possible myocardial injury.  >=22 - Abnormal Male indicating possible myocardial injury.   Clinicians would have to utilize clinical acumen, EKG, Troponin, and serial changes to determine if it is an Acute Myocardial Infarction or myocardial injury due to an underlying chronic condition.         POC Glucose Once [[id number]]  (Abnormal) Collected: 06/09/24 1645    Specimen: Blood Updated: 06/09/24 1647     Glucose 373 mg/dL      Comment: Serial Number: [id number]:  [id number]                Imaging:    CT Head Without Contrast    Result Date: 6/9/2024  CT HEAD WO CONTRAST Date of Exam: 6/9/2024 3:40 PM EDT Indication: AMS. Comparison: MRI brain 4/25/2022, CT head without contrast 8/2/2019 Technique: Axial CT images were obtained of the head without contrast administration.  Reconstructed coronal and sagittal images were also obtained. Automated exposure control and iterative construction methods were used. Findings: No intracranial hemorrhage. Mild cerebral volume loss. Mild white matter findings suggesting chronic microvascular disease. No  midline shift or mass effect. Posterior fossa without acute abnormality. The mastoid air cells are well-aerated. Visualized sinuses are well-aerated. Small mount of fluid in the posterior left ethmoid air cells. No calvarial fracture.     Impression: 1. No acute intracranial abnormality. 2. Stable chronic findings above. Electronically Signed: Earl Cowart MD  6/9/2024 4:22 PM EDT  Workstation ID: [id number]    XR Chest 1 View    Result Date: 6/9/2024  XR CHEST 1 VW Date of Exam: 6/9/2024 3:16 PM EDT Indication: Cough, persistent Comparison: None available. Findings: The heart is normal in size. The lungs are well-expanded and free of infiltrates. Bony structures appear intact.     Impression: No active disease is seen. Electronically Signed: Jeremiah Caba MD  6/9/2024 3:38 PM EDT  Workstation ID: [id number]       Differential Diagnosis and Discussion:    Altered Mental Status: Based on the patient's signs and symptoms, differential diagnosis includes but is not limited to meningitis, stroke, sepsis, subarachnoid hemorrhage, intracranial bleeding, encephalitis, and metabolic encephalopathy.    All labs were reviewed and interpreted by me.  CT scan radiology impression was interpreted by me.    MDM     The patient´s CBC that was reviewed and interpreted by me shows no abnormalities of critical concern. Of note, there is no anemia requiring a blood transfusion and the platelet count is acceptable.  The patient´s CMP that was reviewed and interpretted by me shows no abnormalities of critical concern. Of note, the patient´s sodium and potassium are acceptable. The patient´s liver enzymes are unremarkable. The patient´s renal function (creatinine) is preserved. The patient has a normal anion gap.  Total CK is 500.  Urinalysis is positive for nitrites.  CT head is negative for acute intracranial abnormalities.  Patient was given 1 L normal saline bolus and Rocephin in the ED.    Critical Care Note: Total Critical Care time  of 45 minutes. Total critical care time documented does not include time spent on separately billed procedures for services of nurses or physician assistants. I personally saw and examined the patient. I have reviewed all diagnostic interpretations and treatment plans as written. I was present for the key portions of any procedures performed and the inclusive time noted in any critical care statement. Critical care time includes patient management by me, time spent at the patients bedside,  time to review lab and imaging results, discussing patient care, documentation in the medical record, and time spent with family or caregiver.        Patient Care Considerations:    None      Consultants/Shared Management Plan:    Case was discussed with Dr. Friedman who agrees with admission.    Social Determinants of Health:    Patient is independent, reliable, and has access to care.       Disposition and Care Coordination:    Admit:   Through independent evaluation of the patient's history, physical, and imperical data, the patient meets criteria for inpatient admission to the hospital.        Final diagnoses:   Acute UTI        ED Disposition       ED Disposition   Decision to Admit    Condition   --    Comment   Level of Care: Remote Telemetry [26]   Diagnosis: Generalized weakness [672467]   Admitting Physician: MARGARETH FRIEDMAN [[id number]]   Certification: I Certify That Inpatient Hospital Services Are Medically Necessary For Greater Than 2 Midnights                 This medical record created using voice recognition software.             Jennifer Yang MD  06/09/24 [id number]

## 2024-06-10 LAB
ANION GAP SERPL CALCULATED.3IONS-SCNC: 15 MMOL/L (ref 5–15)
BACTERIA BLD CULT: ABNORMAL
BACTERIA SPEC AEROBE CULT: ABNORMAL
BOTTLE TYPE: ABNORMAL
BUN SERPL-MCNC: 20 MG/DL (ref 8–23)
BUN/CREAT SERPL: 22.5 (ref 7–25)
CALCIUM SPEC-SCNC: 8.6 MG/DL (ref 8.6–10.5)
CHLORIDE SERPL-SCNC: 108 MMOL/L (ref 98–107)
CO2 SERPL-SCNC: 23 MMOL/L (ref 22–29)
CREAT SERPL-MCNC: 0.89 MG/DL (ref 0.57–1)
D-LACTATE SERPL-SCNC: 1.6 MMOL/L (ref 0.5–2)
D-LACTATE SERPL-SCNC: 2.5 MMOL/L (ref 0.5–2)
DEPRECATED RDW RBC AUTO: 50.3 FL (ref 37–54)
EGFRCR SERPLBLD CKD-EPI 2021: 68.6 ML/MIN/1.73
ERYTHROCYTE [DISTWIDTH] IN BLOOD BY AUTOMATED COUNT: 15.3 % (ref 12.3–15.4)
GLUCOSE BLDC GLUCOMTR-MCNC: 147 MG/DL (ref 70–99)
GLUCOSE BLDC GLUCOMTR-MCNC: 155 MG/DL (ref 70–99)
GLUCOSE BLDC GLUCOMTR-MCNC: 167 MG/DL (ref 70–99)
GLUCOSE BLDC GLUCOMTR-MCNC: 196 MG/DL (ref 70–99)
GLUCOSE SERPL-MCNC: 203 MG/DL (ref 65–99)
HBA1C MFR BLD: 11.2 % (ref 4.8–5.6)
HCT VFR BLD AUTO: 43.4 % (ref 34–46.6)
HGB BLD-MCNC: 13.6 G/DL (ref 12–15.9)
MCH RBC QN AUTO: 27.8 PG (ref 26.6–33)
MCHC RBC AUTO-ENTMCNC: 31.3 G/DL (ref 31.5–35.7)
MCV RBC AUTO: 88.6 FL (ref 79–97)
PLATELET # BLD AUTO: 268 10*3/MM3 (ref 140–450)
PMV BLD AUTO: 12.1 FL (ref 6–12)
POTASSIUM SERPL-SCNC: 3.2 MMOL/L (ref 3.5–5.2)
RBC # BLD AUTO: 4.9 10*6/MM3 (ref 3.77–5.28)
SODIUM SERPL-SCNC: 146 MMOL/L (ref 136–145)
WBC NRBC COR # BLD AUTO: 13.29 10*3/MM3 (ref 3.4–10.8)

## 2024-06-10 PROCEDURE — 92610 EVALUATE SWALLOWING FUNCTION: CPT

## 2024-06-10 PROCEDURE — 63710000001 INSULIN GLARGINE PER 5 UNITS: Performed by: FAMILY MEDICINE

## 2024-06-10 PROCEDURE — 82948 REAGENT STRIP/BLOOD GLUCOSE: CPT | Performed by: FAMILY MEDICINE

## 2024-06-10 PROCEDURE — 25010000002 HEPARIN (PORCINE) PER 1000 UNITS: Performed by: FAMILY MEDICINE

## 2024-06-10 PROCEDURE — 25010000002 POTASSIUM CHLORIDE 10 MEQ/100ML SOLUTION: Performed by: PHYSICIAN ASSISTANT

## 2024-06-10 PROCEDURE — 63710000001 INSULIN LISPRO (HUMAN) PER 5 UNITS: Performed by: FAMILY MEDICINE

## 2024-06-10 PROCEDURE — 80048 BASIC METABOLIC PNL TOTAL CA: CPT | Performed by: FAMILY MEDICINE

## 2024-06-10 PROCEDURE — 83605 ASSAY OF LACTIC ACID: CPT | Performed by: EMERGENCY MEDICINE

## 2024-06-10 PROCEDURE — 25010000002 CEFTRIAXONE PER 250 MG: Performed by: FAMILY MEDICINE

## 2024-06-10 PROCEDURE — 99232 SBSQ HOSP IP/OBS MODERATE 35: CPT | Performed by: FAMILY MEDICINE

## 2024-06-10 PROCEDURE — 85027 COMPLETE CBC AUTOMATED: CPT | Performed by: FAMILY MEDICINE

## 2024-06-10 PROCEDURE — 25810000003 LACTATED RINGERS PER 1000 ML: Performed by: FAMILY MEDICINE

## 2024-06-10 PROCEDURE — 82948 REAGENT STRIP/BLOOD GLUCOSE: CPT

## 2024-06-10 PROCEDURE — 97161 PT EVAL LOW COMPLEX 20 MIN: CPT

## 2024-06-10 RX ORDER — POTASSIUM CHLORIDE 7.45 MG/ML
10 INJECTION INTRAVENOUS
Qty: 300 ML | Refills: 0 | Status: DISPENSED | OUTPATIENT
Start: 2024-06-10 | End: 2024-06-10

## 2024-06-10 RX ORDER — POTASSIUM CHLORIDE 7.45 MG/ML
10 INJECTION INTRAVENOUS
Status: COMPLETED | OUTPATIENT
Start: 2024-06-10 | End: 2024-06-10

## 2024-06-10 RX ORDER — POTASSIUM CHLORIDE 1.5 G/1.58G
20 POWDER, FOR SOLUTION ORAL 2 TIMES DAILY
Status: DISCONTINUED | OUTPATIENT
Start: 2024-06-10 | End: 2024-06-10

## 2024-06-10 RX ORDER — DIAPER,BRIEF,INFANT-TODD,DISP
1 EACH MISCELLANEOUS
Status: DISCONTINUED | OUTPATIENT
Start: 2024-06-10 | End: 2024-06-13 | Stop reason: HOSPADM

## 2024-06-10 RX ORDER — HYDROCHLOROTHIAZIDE 25 MG/1
25 TABLET ORAL DAILY
COMMUNITY
End: 2024-06-13 | Stop reason: HOSPADM

## 2024-06-10 RX ORDER — SODIUM CHLORIDE, SODIUM LACTATE, POTASSIUM CHLORIDE, CALCIUM CHLORIDE 600; 310; 30; 20 MG/100ML; MG/100ML; MG/100ML; MG/100ML
100 INJECTION, SOLUTION INTRAVENOUS CONTINUOUS
Status: ACTIVE | OUTPATIENT
Start: 2024-06-10 | End: 2024-06-10

## 2024-06-10 RX ADMIN — ZINC OXIDE 1 APPLICATION: 200 OINTMENT TOPICAL at 16:21

## 2024-06-10 RX ADMIN — INSULIN LISPRO 3 UNITS: 100 INJECTION, SOLUTION INTRAVENOUS; SUBCUTANEOUS at 21:11

## 2024-06-10 RX ADMIN — POTASSIUM CHLORIDE 10 MEQ: 7.46 INJECTION, SOLUTION INTRAVENOUS at 18:10

## 2024-06-10 RX ADMIN — ZINC OXIDE 1 APPLICATION: 200 OINTMENT TOPICAL at 23:09

## 2024-06-10 RX ADMIN — POTASSIUM CHLORIDE 10 MEQ: 7.46 INJECTION, SOLUTION INTRAVENOUS at 11:08

## 2024-06-10 RX ADMIN — INSULIN GLARGINE 20 UNITS: 100 INJECTION, SOLUTION SUBCUTANEOUS at 21:11

## 2024-06-10 RX ADMIN — INSULIN LISPRO 3 UNITS: 100 INJECTION, SOLUTION INTRAVENOUS; SUBCUTANEOUS at 11:14

## 2024-06-10 RX ADMIN — INSULIN LISPRO 3 UNITS: 100 INJECTION, SOLUTION INTRAVENOUS; SUBCUTANEOUS at 08:16

## 2024-06-10 RX ADMIN — Medication 250 MG: at 21:10

## 2024-06-10 RX ADMIN — HEPARIN SODIUM 5000 UNITS: 5000 INJECTION INTRAVENOUS; SUBCUTANEOUS at 21:10

## 2024-06-10 RX ADMIN — BACITRACIN 0.9 G: 500 OINTMENT TOPICAL at 21:11

## 2024-06-10 RX ADMIN — SODIUM CHLORIDE, POTASSIUM CHLORIDE, SODIUM LACTATE AND CALCIUM CHLORIDE 100 ML/HR: 600; 310; 30; 20 INJECTION, SOLUTION INTRAVENOUS at 07:56

## 2024-06-10 RX ADMIN — HEPARIN SODIUM 5000 UNITS: 5000 INJECTION INTRAVENOUS; SUBCUTANEOUS at 08:16

## 2024-06-10 RX ADMIN — Medication 10 ML: at 08:16

## 2024-06-10 RX ADMIN — POTASSIUM CHLORIDE 10 MEQ: 7.46 INJECTION, SOLUTION INTRAVENOUS at 16:20

## 2024-06-10 RX ADMIN — CEFTRIAXONE SODIUM 1000 MG: 1 INJECTION, POWDER, FOR SOLUTION INTRAMUSCULAR; INTRAVENOUS at 17:51

## 2024-06-10 NOTE — PLAN OF CARE
Goal Outcome Evaluation:  Plan of Care Reviewed With: patient        Progress: no change  Outcome Evaluation: Pt presents with limitations that impede their ability to safely and independently transfer and ambulate. The skills of a therapist will be required to safely and effectively implement the following treatment plan to restore maximal level of function.      Anticipated Discharge Disposition (PT): sub acute care setting, extended care facility, home with 24/7 care

## 2024-06-10 NOTE — THERAPY EVALUATION
Acute Care - Speech Language Pathology   Swallow Initial Evaluation  Bhavana     Patient Name: Charis Hill  : 1950  MRN: [id number]  Today's Date: 6/10/2024               Admit Date: 2024    Visit Dx:     ICD-10-CM ICD-9-CM   1. Acute UTI  N39.0 599.0     Patient Active Problem List   Diagnosis    Diabetes mellitus, type II, insulin dependent    Hypertension    Hyperlipidemia    Advance directive declined by patient    Speech disturbance    Stroke    TORSTEN (acute kidney injury)    Acute UTI    Generalized weakness    Altered mental status     Past Medical History:   Diagnosis Date    Advance directive declined by patient 2020    Aphagia 2019    Diabetes mellitus, type II     Glaucoma     Hyperlipidemia     Hypertension     Insulin dependent type 2 diabetes mellitus, uncontrolled 2020    Medication management 2020     History reviewed. No pertinent surgical history.      Inpatient Speech Pathology Dysphagia Evaluation        PAIN SCALE: None noted    PRECAUTIONS/CONTRAINDICATIONS: None noted    SUSPECTED ABUSE/NEGLECT/EXPLOITATION: None noted    SOCIAL/PSYCHOLOGICAL NEEDS/BARRIERS: None noted    PAST SOCIAL HISTORY: Lives at home    PRIOR FUNCTION: Independent    PATIENT GOALS/EXPECTATIONS: Did not report    HISTORY: This patient is a 73-year-old female admitted with the above diagnosis.  Patient found down by family.    CURRENT DIET LEVEL: N.p.o.    OBJECTIVE:    TEST ADMINISTERED: Clinical dysphagia evaluation    COGNITION/SAFETY AWARENESS: Awake    BEHAVIORAL OBSERVATIONS: Cooperative    ORAL MOTOR EXAM: Difficult to fully assess as patient does not consistently follow commands.    VOICE QUALITY: Within functional limits    REFLEX EXAM: Deferred    POSTURE: 90 degrees upright    FEEDING/SWALLOWING FUNCTION: Assessed with thin liquids and purée consistencies.  Attempted soft solids but unable to manage    CLINICAL OBSERVATIONS: Oral stage is characterized by adequate bolus  preparation and control for purée and thin liquids.  Patient unable to tolerate soft solid trials, clear from oral cavity.  Pharyngeal phase appears timely with good laryngeal elevation per palpation.  No clinical signs or symptoms of aspiration noted.  Vocal quality remained clear compared to baseline, even when challenged with large sequential straw trials.    DYSPHAGIA CRITERIA: Oral stage dysphagia     FUNCTIONAL ASSESSMENT INSTRUMENT: Patient currently scored a level 4 of 7 on Functional Communication Measures for swallowing indicating a 40-59% limitation in function.    ASSESSMENT/ PLAN OF CARE:  Pt presents with limitations, noted below, that impede her ability to swallow safely. The skills of a therapist will be required to safely and effectively implement the following treatment plan to restore maximal level of function.    PROBLEMS:  1.  Dysphagia   LTG 1: (30 days) patient will increase ability to tolerate least restrictive diet and improve functional communication measure for swallowing to 6 of 7     STG 1b: (14 days) patient will tolerate therapeutic trials of soft solids without clinical sign or symptom of aspiration with 8 of 10 trials.   STG 1c: (14 days) patient/family teaching regarding diet recommendations, safe swallow strategies and signs and symptoms of aspiration.   TREATMENT: Dysphagia therapy to ensure diet tolerance, advance to least restrictive diet and analyze aspiration risk    FREQUENCY/DURATION: Daily 5 times a week    REHAB POTENTIAL:  Pt has good rehab potential.  The following limitations may influence improvement/ length of tx medical status.    RECOMMENDATIONS:   1.   DIET: Puréed diet-thin liquids    2.  POSITION: 90 degrees upright for all intake    3.  COMPENSATORY STRATEGIES: Slow rate, small bites/drinks, oral meds in applesauce crushed if needed    Pt/responsible party agrees with plan of care and has been informed of all alternatives, risks and benefits.      EDUCATION  The  patient has been educated in the following areas:   Dysphagia (Swallowing Impairment).              Grace Molina, SLP  6/10/2024

## 2024-06-10 NOTE — THERAPY EVALUATION
Acute Care - Physical Therapy Initial Evaluation   Bhavana     Patient Name: Charis Hill  : 1950  MRN: [id number]  Today's Date: 6/10/2024      Visit Dx:     ICD-10-CM ICD-9-CM   1. Acute UTI  N39.0 599.0   2. Difficulty walking  R26.2 719.7     Patient Active Problem List   Diagnosis    Diabetes mellitus, type II, insulin dependent    Hypertension    Hyperlipidemia    Advance directive declined by patient    Speech disturbance    Stroke    TORSTEN (acute kidney injury)    Acute UTI    Generalized weakness    Altered mental status     Past Medical History:   Diagnosis Date    Advance directive declined by patient 2020    Aphagia 2019    Diabetes mellitus, type II     Glaucoma     Hyperlipidemia     Hypertension     Insulin dependent type 2 diabetes mellitus, uncontrolled 2020    Medication management 2020     History reviewed. No pertinent surgical history.  PT Assessment (Last 12 Hours)       PT Evaluation and Treatment       Row Name 06/10/24 1400          Physical Therapy Time and Intention    Subjective Information no complaints  -CS     Document Type evaluation  -CS     Mode of Treatment individual therapy;physical therapy  -CS     Patient Effort poor  -CS       Row Name 06/10/24 1400          General Information    Patient Profile Reviewed yes  -CS     Patient Observations poorly cooperative  limited alertness  -CS     Prior Level of Function independent:;all household mobility;gait;transfer;bed mobility;ADL's  Per EMR and previous notes, she had been living alone and does not allow her sons to help much.  -CS     Equipment Currently Used at Home walker, rolling  no home O2 used; Unsure on accuracy of PLOF as patient has difficulty answering questions  -CS     Existing Precautions/Restrictions fall  -CS     Limitations/Impairments safety/cognitive;aphasia  -CS     Barriers to Rehab none identified  -CS       Row Name 06/10/24 1400          Living Environment    Current Living  Arrangements home  -CS     People in Home alone  -CS     Primary Care Provided by self  -       Row Name 06/10/24 1400          Pain    Pretreatment Pain Rating 0/10 - no pain  -CS     Posttreatment Pain Rating 0/10 - no pain  -[facility] Name 06/10/24 1400          Cognition    Affect/Mental Status (Cognition) confused;low arousal/lethargic  -CS     Orientation Status (Cognition) oriented to;person  -CS     Follows Commands (Cognition) delayed response/completion;increased processing time needed;physical/tactile prompts required  pt slow to respond and has aphasia from previous stroke; does better with yes and no questions or writing on white board  -       Row Name 06/10/24 1400          Range of Motion Comprehensive    General Range of Motion bilateral lower extremity ROM WFL  with AAROM, difficulty staying awake and follow directions; she needed max cues for participation and staying awake  -[facility] Name 06/10/24 1400          Strength Comprehensive (MMT)    General Manual Muscle Testing (MMT) Assessment lower extremity strength deficits identified  -CS     Comment, General Manual Muscle Testing (MMT) Assessment Gross[place], BLEs assessed at 3-/5  -CS       [facility] Name 06/10/24 1400          Bed Mobility    Bed Mobility bed mobility (all) activities  -CS     All Activities, [place] (Bed Mobility) verbal cues;nonverbal cues (demo/gesture);maximum assist (25% patient effort)  -CS     Bed Mobility, Safety Issues cognitive deficits limit understanding;decreased use of arms for pushing/pulling;decreased use of legs for bridging/pushing  -CS     Assistive Device (Bed Mobility) bed rails;draw sheet  -CS     Comment, (Bed Mobility) No further transfers completed due to decreased ability to help and poor alertness.  -       Row Name 06/10/24 1400          Gait/Stairs (Locomotion)    Patient was able to Ambulate no, other medical factors prevent ambulation  -CS     Reason Patient was unable to Ambulate Other  (Comment)  difficulty staying awake  -CS       Row Name 06/10/24 1400          Safety Issues, Functional Mobility    Safety Issues Affecting Function (Mobility) sequencing abilities;problem-solving;safety precaution awareness;insight into deficits/self-awareness;awareness of need for assistance;ability to follow commands  -CS     Impairments Affecting Function (Mobility) balance;cognition;endurance/activity tolerance;strength;pain  -CS       Row Name 06/10/24 1400          Balance    Comment, Balance Unable to be tested but likely impaired  -CS       Row Name             Wound 06/09/24 0000 Right cheek Skin Tear    Wound - Properties Group Placement Date: 06/09/24  -DG Placement Time: 0000  -DG Present on Original Admission: Y  -DG Side: Right  -DG Location: cheek  -DG Primary Wound Type: Skin tear  -DG    Retired Wound - Properties Group Placement Date: 06/09/24  -DG Placement Time: 0000  -DG Present on Original Admission: Y  -DG Side: Right  -DG Location: cheek  -DG Primary Wound Type: Skin tear  -DG    Retired Wound - Properties Group Date first assessed: 06/09/24  -DG Time first assessed: 0000  -DG Present on Original Admission: Y  -DG Side: Right  -DG Location: cheek  -DG Primary Wound Type: Skin tear  -DG      Row Name             Wound 06/09/24 0000 Bilateral groin MASD (Moisture associated skin damage)    Wound - Properties Group Placement Date: 06/09/24  -DG Placement Time: 0000  -DG Present on Original Admission: Y  -DG Side: Bilateral  -DG Location: groin  -DG Primary Wound Type: MASD  -DG    Retired Wound - Properties Group Placement Date: 06/09/24  -DG Placement Time: 0000  -DG Present on Original Admission: Y  -DG Side: Bilateral  -DG Location: groin  -DG Primary Wound Type: MASD  -DG    Retired Wound - Properties Group Date first assessed: 06/09/24  -DG Time first assessed: 0000  -DG Present on Original Admission: Y  -DG Side: Bilateral  -DG Location: groin  -DG Primary Wound Type: MASD  -DG      Row  Name             Wound 06/09/24 0000 Right anterior hip Pressure Injury    Wound - Properties Group Placement Date: 06/09/24  -DG Placement Time: 0000  -DG Present on Original Admission: Y  -DG Side: Right  -DG Orientation: anterior  -DG Location: hip  -DG Primary Wound Type: Pressure inj  -DG    Retired Wound - Properties Group Placement Date: 06/09/24  -DG Placement Time: 0000  -DG Present on Original Admission: Y  -DG Side: Right  -DG Orientation: anterior  -DG Location: hip  -DG Primary Wound Type: Pressure inj  -DG    Retired Wound - Properties Group Date first assessed: 06/09/24  -DG Time first assessed: 0000  -DG Present on Original Admission: Y  -DG Side: Right  -DG Location: hip  -DG Primary Wound Type: Pressure inj  -DG      Row Name             Wound 06/09/24 0000 Right proximal leg Pressure Injury    Wound - Properties Group Placement Date: 06/09/24  -DG Placement Time: 0000  -DG Side: Right  -DG Orientation: proximal  -DG Location: leg  -DG Primary Wound Type: Pressure inj  -DG    Retired Wound - Properties Group Placement Date: 06/09/24  -DG Placement Time: 0000  -DG Side: Right  -DG Orientation: proximal  -DG Location: leg  -DG Primary Wound Type: Pressure inj  -DG    Retired Wound - Properties Group Date first assessed: 06/09/24  -DG Time first assessed: 0000  -DG Side: Right  -DG Location: leg  -DG Primary Wound Type: Pressure inj  -DG      Row Name             Wound 06/09/24 0000 Left proximal leg Pressure Injury    Wound - Properties Group Placement Date: 06/09/24  -DG Placement Time: 0000  -DG Present on Original Admission: Y  -DG Side: Left  -DG Orientation: proximal  -DG Location: leg  -DG Primary Wound Type: Pressure inj  -DG    Retired Wound - Properties Group Placement Date: 06/09/24  -DG Placement Time: 0000  -DG Present on Original Admission: Y  -DG Side: Left  -DG Orientation: proximal  -DG Location: leg  -DG Primary Wound Type: Pressure inj  -DG    Retired Wound - Properties Group Date  first assessed: 06/09/24  -DG Time first assessed: 0000  -DG Present on Original Admission: Y  -DG Side: Left  -DG Location: leg  -DG Primary Wound Type: Pressure inj  -DG      Row Name             Wound 06/09/24 0000 Bilateral coccyx Pressure Injury    Wound - Properties Group Placement Date: 06/09/24  -DG Placement Time: 0000  -DG Present on Original Admission: Y  -DG Side: Bilateral  -DG Location: coccyx  -DG Primary Wound Type: Pressure inj  -DG    Retired Wound - Properties Group Placement Date: 06/09/24  -DG Placement Time: 0000  -DG Present on Original Admission: Y  -DG Side: Bilateral  -DG Location: coccyx  -DG Primary Wound Type: Pressure inj  -DG    Retired Wound - Properties Group Date first assessed: 06/09/24  -DG Time first assessed: 0000  -DG Present on Original Admission: Y  -DG Side: Bilateral  -DG Location: coccyx  -DG Primary Wound Type: Pressure inj  -DG      Row Name 06/10/24 1400          Plan of Care Review    Plan of Care Reviewed With patient  -CS     Progress no change  -CS     Outcome Evaluation Pt presents with limitations that impede their ability to safely and independently transfer and ambulate. The skills of a therapist will be required to safely and effectively implement the following treatment plan to restore maximal level of function.  -CS       Row Name 06/10/24 1400          Positioning and Restraints    Pre-Treatment Position in bed  -CS     Post Treatment Position bed  -CS     In Bed supine;call light within reach;encouraged to call for assist;exit alarm on  -CS       Row Name 06/10/24 1400          Therapy Assessment/Plan (PT)    Rehab Potential (PT) fair, will monitor progress closely  -CS     Criteria for Skilled Interventions Met (PT) yes;skilled treatment is necessary  -CS     Therapy Frequency (PT) daily  -CS     Predicted Duration of Therapy Intervention (PT) 10 days  -CS     Problem List (PT) problems related to;balance;cognition;mobility;communication;pain;strength  -CS      Activity Limitations Related to Problem List (PT) unable to ambulate safely;unable to transfer safely  -CS       Row Name 06/10/24 1400          PT Evaluation Complexity    History, PT Evaluation Complexity 1-2 personal factors and/or comorbidities  -CS     Examination of Body Systems (PT Eval Complexity) total of 4 or more elements  -CS     Clinical Presentation (PT Evaluation Complexity) stable  -CS     Clinical Decision Making (PT Evaluation Complexity) low complexity  -CS     Overall Complexity (PT Evaluation Complexity) low complexity  -CS       Row Name 06/10/24 1400          Therapy Plan Review/Discharge Plan (PT)    Therapy Plan Review (PT) evaluation/treatment results reviewed;patient  -CS       Row Name 06/10/24 1400          Physical Therapy Goals    Bed Mobility Goal Selection (PT) bed mobility, PT goal 1  -CS     Transfer Goal Selection (PT) transfer, PT goal 1  -CS     Gait Training Goal Selection (PT) gait training, PT goal 1  -       Row Name 06/10/24 1400          Bed Mobility Goal 1 (PT)    Activity/Assistive Device (Bed Mobility Goal 1, PT) bed mobility activities, all  -CS     [place] Level/Cues Needed (Bed Mobility Goal 1, PT) minimum assist (75% or more patient effort)  -CS     Time Frame (Bed Mobility Goal 1, PT) long term goal (LTG);10 days  -       Row Name 06/10/24 1400          Transfer Goal 1 (PT)    Activity/Assistive Device (Transfer Goal 1, PT) sit-to-stand/stand-to-sit;bed-to-chair/chair-to-bed;walker, rolling  -CS     [place] Level/Cues Needed (Transfer Goal 1, PT) minimum assist (75% or more patient effort);verbal cues required;nonverbal cues (demo/gesture) required  -CS     Time Frame (Transfer Goal 1, PT) long term goal (LTG);10 days  -       Row Name 06/10/24 1400          Gait Training Goal 1 (PT)    Activity/Assistive Device (Gait Training Goal 1, PT) gait (walking locomotion);assistive device use;walker, rolling  -CS     [place] Level (Gait Training  Goal 1, PT) contact guard required  -CS     Distance (Gait Training Goal 1, PT) 50  -CS     Time Frame (Gait Training Goal 1, PT) long term goal (LTG);10 days  -CS               User Key  (r) = Recorded By, (t) = Taken By, (c) = Cosigned By      Initials Name Provider Type    Pippa Stewart PT Physical Therapist    Adeel Livingston RN Registered Nurse                      PT Recommendation and Plan  Anticipated Discharge Disposition (PT): sub acute care setting, extended care facility, home with 24/7 care  Planned Therapy Interventions (PT): bed mobility training, balance training, gait training, transfer training, strengthening  Therapy Frequency (PT): daily  Plan of Care Reviewed With: patient  Progress: no change  Outcome Evaluation: Pt presents with limitations that impede their ability to safely and independently transfer and ambulate. The skills of a therapist will be required to safely and effectively implement the following treatment plan to restore maximal level of function.   Outcome Measures       Row Name 06/10/24 1300             How much help from another person do you currently need...    Turning from your back to your side while in flat bed without using bedrails? 1  -CS      Moving from lying on back to sitting on the side of a flat bed without bedrails? 1  -CS      Moving to and from a bed to a chair (including a wheelchair)? 1  -CS      Standing up from a chair using your arms (e.g., wheelchair, bedside chair)? 1  -CS      Climbing 3-5 steps with a railing? 1  -CS      To walk in hospital room? 1  -CS      AM-PAC 6 Clicks Score (PT) 6  -CS      Highest Level of Mobility Goal 2 --> Bed activities/dependent transfer  -CS         Functional Assessment    Outcome Measure Options AM-PAC 6 Clicks Basic Mobility (PT)  -CS                User Key  (r) = Recorded By, (t) = Taken By, (c) = Cosigned By      Initials Name Provider Type    Pippa Stewart PT Physical Therapist                      Time Calculation:    PT Charges       Row Name 06/10/24 1400             Time Calculation    PT Received On 06/10/24  -CS      PT Goal Re-Cert Due Date 06/19/24  -CS         Untimed Charges    PT Eval/Re-eval Minutes 25  -CS         Total Minutes    Untimed Charges Total Minutes 25  -CS       Total Minutes 25  -CS                User Key  (r) = Recorded By, (t) = Taken By, (c) = Cosigned By      Initials Name Provider Type    CS Pippa Ron, PT Physical Therapist                  Therapy Charges for Today       Code Description Service Date Service Provider Modifiers Qty    [id number] HC PT EVAL LOW COMPLEXITY 2 6/10/2024 Pippa Ron PT GP 1            PT G-Codes  Outcome Measure Options: AM-PAC 6 Clicks Basic Mobility (PT)  AM-PAC 6 Clicks Score (PT): 6    Pippa Ron PT  6/10/2024

## 2024-06-10 NOTE — SIGNIFICANT NOTE
06/10/24 0801   OTHER   Discipline speech language pathologist   Rehab Time/Intention   Session Not Performed unable to evaluate, medical status change  (Not alert enough to evaluate.)

## 2024-06-10 NOTE — PLAN OF CARE
Goal Outcome Evaluation:                   FUNCTIONAL ASSESSMENT INSTRUMENT: Patient currently scored a level 4 of 7 on Functional Communication Measures for swallowing indicating a 40-59% limitation in function.    ASSESSMENT/ PLAN OF CARE:  Pt presents with limitations, noted below, that impede her ability to swallow safely. The skills of a therapist will be required to safely and effectively implement the following treatment plan to restore maximal level of function.    PROBLEMS:  1.  Dysphagia     TREATMENT: Dysphagia therapy to ensure diet tolerance, advance to least restrictive diet and analyze aspiration risk    FREQUENCY/DURATION: Daily 5 times a week    REHAB POTENTIAL:  Pt has good rehab potential.  The following limitations may influence improvement/ length of tx medical status.    RECOMMENDATIONS:   1.   DIET: Puréed diet-thin liquids    2.  POSITION: 90 degrees upright for all intake    3.  COMPENSATORY STRATEGIES: Slow rate, small bites/drinks, oral meds in applesauce crushed if needed    Pt/responsible party agrees with plan of care and has been informed of all alternatives, risks and benefits.      EDUCATION  The patient has been educated in the following areas:   Dysphagia (Swallowing Impairment).

## 2024-06-10 NOTE — CONSULTS
Purpose of the visit was to evaluate for: goals of care/advanced care planning. Spoke with family and discussed palliative care, goals of care, resuscitation status, and advanced care planning.      Assessment: Patient is currently on [facility]. Patient eyes open, reportedly is aphasic- unclear on orientation at this time. No acp on file. Call placed to patients son listed, Kwesi. Introduced self and explained role and purpose of visit. Discussed patients current condition and provided update. Patients son shares he does have a medical POA document, requested copy for records. Patient has 2 sons- patients son Javier expresses patients son Ranjan has stated he does not want to be involved in medical decision making. Patients son reports he would like patient have placement, but reportedly she has refused in the past. Patients son reports patient primarily communicates through typing out on her phone- which he is unsure if she has at bedside this admission. Provided education on code status- patients son reports they have not had conversations in this regard. Encouraged continued conversations regarding cpr/intubation/code event. Provided palliative care contact information and encouraged to call with further questions/concerns.    Tasks Completed: Emotional Support.    Palliative care will continue to follow to support and assist.    Jordana FERNANDEZ, RN, PN  Palliative Care

## 2024-06-10 NOTE — PROGRESS NOTES
[facility]   Hospitalist Progress Note  Date: 6/10/2024  Patient Name: Charis Hill  : 1950  MRN: [id number]  Date of admission: 2024      Subjective   Subjective     Chief Complaint: Altered mental status    Summary: 73-year-old female past medical history significant for diabetes, hypertension, hyperlipidemia, advanced dementia, aphasia, GERD presented to the ED via EMS for evaluation of altered mental status evidently patient has had worsening mental status increasing falls in the ED labs showed elevated CK elevated lactate leukocytosis hyperglycemia urinalysis positive for UTI CT scan of the head was negative admitted to the hospital for further evaluation treatment    Interval Followup: Patient is aphasic but awake remains on IV fluids remains on IV antibiotics with ceftriaxone pending culture results blood glucose improved continue SSI per protocol continue basal insulin replacing potassium orally rechecking levels in a.m.    Review of systems unable to obtain  Objective   Objective     Vitals:   Temp:  [97.3 °F (36.3 °C)-98.2 °F (36.8 °C)] 98.2 °F (36.8 °C)  Heart Rate:  [69-94] 80  Resp:  [14-18] 16  BP: (115-141)/(46-75) 125/50    Physical Exam   Constitutional: Awake but nonverbal  Respiratory: Diminished with poor inspiratory effort  Cardiovascular: RRR  GI abdomen soft nontender  Extremities without edema    Result Review    Result Review:  I have personally reviewed the results from the time of this admission to 6/10/2024 14:39 EDT and agree with these findings:  []  Laboratory  []  Microbiology  []  Radiology  []  EKG/Telemetry   []  Cardiology/Vascular   []  Pathology  []  Old records  []  Other:    Assessment & Plan   Assessment / Plan     Assessment:    Urinary tract infection  Altered mental status  Dementia with aphasia  Hypokalemia  Diabetes with hyperglycemia  Hypertension  Advanced dementia    Plan:    IV antibiotics pending culture results  Gentle IV fluids  Basal insulin  and SSI per protocol  Replace potassium intravenously  PT OT speech therapy consultation  Palliative care consultation  Further treatment contingent upon her hospital course     Discussed plan with RN.    DVT prophylaxis:  Medical DVT prophylaxis orders are present.        CODE STATUS:   Level Of Support Discussed With: Patient; Health Care Surrogate  Code Status (Patient has no pulse and is not breathing): CPR (Attempt to Resuscitate)  Medical Interventions (Patient has pulse or is breathing): Full Support        Electronically signed by RACQUEL Blake, 06/10/24, 2:39 PM EDT.    Attending documentation:  I reviewed the above documentation and independently reviewed and rounded and evaluated the patient and discussed the care plan with WALI Herrera PA-C, I agree with his findings and plan as documented, what I have added to the care plan and modified is as follows in my documentation and my medical decision making; 73-year-old female with history of diabetes, dyslipidemia, hypertension, dementia, aphasia, hospitalized on 6/9/2024 for with altered mental status, concern for UTI, urine culture pending, placed on broad-spectrum antibiotics.  Interval follow-up: Seen and examined this morning, no acute distress, no acute major night events, remains unresponsive not verbal, but arousable.  Urine culture pending, white blood cell count 13,000, lactate down to 1.6, blood sugar in the 100 range, potassium 3.2, sodium 146.  Following hypernatremia, may need to change fluids depending on how her serum sodium pans out.  Unable to obtain review of systems due to altered mental status.  Vitals reviewed, labs reviewed, physical exam elderly appearing female with some bruises to her face, nonverbal, regular rate rhythm, diminished breath sounds, soft nontender abdomen, no lower extremity edema.  Assessment as above acute metabolic encephalopathy due to UTI with underlying dementia and aphasia, plan, continue ceftriaxone,  follow-up urine culture, IV potassium replacement, trending white blood cell count, insulin sliding scale coverage, a.m. labs, full code, DVT prophylaxis with heparin, clinical course to dictate further management, discussed with nurse at bedside.  More than 70 % of the time of this patient's encounter was performed by me, this included face-to-face time, planning and coordinating, medical decision making and critical thinking personally done by me.    Electronically signed by Km Ambriz MD, 06/10/24, 6:19 PM EDT.  Portions of this documentation were transcribed electronically from a voice recognition software.  I confirm all data accurately represents the service(s) I performed at today's visit.

## 2024-06-10 NOTE — PLAN OF CARE
Goal Outcome Evaluation:  Plan of Care Reviewed With: other (see comments)        Progress: no change  Outcome Evaluation: patient is unresponsive however will respond to pain at times vitals are stable progressing toward goals

## 2024-06-10 NOTE — SIGNIFICANT NOTE
Wound Eval / Progress Noted    CARA Bateman     Patient Name: Charis Hill  : 1950  MRN: [id number]  Today's Date: 6/10/2024                 Admit Date: 2024    Visit Dx:    ICD-10-CM ICD-9-CM   1. Acute UTI  N39.0 599.0   2. Difficulty walking  R26.2 719.7         Generalized weakness    Diabetes mellitus, type II, insulin dependent    Hypertension    Hyperlipidemia    Advance directive declined by patient    Speech disturbance    Acute UTI    Altered mental status        Past Medical History:   Diagnosis Date    Advance directive declined by patient 2020    Aphagia 2019    Diabetes mellitus, type II     Glaucoma     Hyperlipidemia     Hypertension     Insulin dependent type 2 diabetes mellitus, uncontrolled 2020    Medication management 2020        History reviewed. No pertinent surgical history.      Physical Assessment:     06/10/24 1120   Wound 24 0000 Right cheek Skin Tear   Placement Date/Time: 24 0000   Present on Original Admission: Yes  Side: Right  Location: cheek  Primary Wound Type: Skin Tear   Wound Image    Dressing Appearance open to air   Closure None   Base red;moist;yellow   Red (%), Wound Tissue Color 50   Yellow (%), Wound Tissue Color 50   Periwound blanchable;redness   Periwound Temperature warm   Periwound Skin Turgor soft   Edges open   Wound Length (cm) 1.6 cm   Wound Width (cm) 1.3 cm   Wound Depth (cm) 0.1 cm   Wound Surface Area (cm^2) 2.08 cm^2   Wound Volume (cm^3) 0.208 cm^3   Drainage Characteristics/Odor serous   Drainage Amount scant   Care, Wound cleansed with;sterile normal saline   Dressing Care open to air   Wound 24 0000 Bilateral groin MASD (Moisture associated skin damage)   Placement Date/Time: 24 0000   Present on Original Admission: Yes  Side: Bilateral  Location: groin  Primary Wound Type: MASD (Moisture associated skin damage)   Dressing Appearance open to air   Closure None   Base moist;red;other (see  comments)  (fungal presentation)   Periwound redness;warm;moist   Periwound Temperature warm   Periwound Skin Turgor soft   Edges rolled/closed   Drainage Amount none   Wound 06/09/24 0000 Bilateral coccyx Pressure Injury   Placement Date/Time: 06/09/24 0000   Present on Original Admission: Yes  Side: Bilateral  Location: coccyx  Primary Wound Type: Pressure Injury   Wound Image    Dressing Appearance intact;no drainage   Closure None   Base blanchable;maroon/purple;red   Periwound blanchable;redness;warm;moist   Periwound Temperature warm   Periwound Skin Turgor soft   Edges rolled/closed   Drainage Amount none   Care, Wound cleansed with;sterile normal saline   Dressing Care dressing removed     Rt Elbow 3cm x 2cm   Left Elbow 3cm x 4cm x   Bilateral breast folds - erosion      Right lateral groin / abdominal fold - erosion       Wound Check / Follow-up: Patient seen today for wound consult.  Patient is currently sitting in bed awake with her eyes open.  Patient is primarily nonverbal but does attempt to engage with staff and answer questions as able.  Explained purpose for visit.    It is unclear exactly what may have transpired, however it is believed that patient may have sustained a fall and was later found down by her family.    Patient does have blanchable redness along the right aspect of her forehead.  This area  measures 1 cm x 4 cm.  She further has an area of tissue loss noted to her right cheek.  This area is documented and measured as above.  The wound base here is red and moist along with yellow moist tissue.  Cleansed with normal saline and gauze will recommend application of topical treatments.  Traumatic injury versus pressure injury should be considered.    Blanchable redness is noted also to the left cheek.    Patient has extensive redness and moisture noted within her skin folds bilateral breast abdomen and groin are affected.  There is significant erosion to the medial aspects of bilateral  breast folds, to the right lateral abdominal fold and right lateral groin fold.  Cleansed areas with normal saline and gauze will recommend topical treatments for these areas.  Significant moisture associated skin damage along with fungal presentation is further noted to the perineal area extending out to the gluteal aspects.  Also recommend topical treatment to these areas.  Le catheter is in place for bladder management.    Blanchable tissue discoloration is noted to bilateral gluteal aspects.  This tissue discoloration is pink to purple in color.  There are no open areas at present time.  Cleansed with normal saline and gauze will recommend application of topical treatments.    Patient does additionally have injuries noted to bilateral elbows.  These could be consistent with traumatic injuries from a fall or also pressure from lying in a certain position for prolonged periods of time.  The wound bases are dried and crusted with redness and some yellow discoloration.  Cleansed with normal saline and gauze will recommend application of daily dressings with nonadherent petroleum-based gauze and silicone border dressing at this time.    Blanchable redness was further noted to bilateral knees.    Impression: Traumatic injuries versus pressure to right forehead, right cheek and bilateral elbows.  Moisture associated skin damage with fungal presentation within skin folds and to perineum.  Tissue discoloration noted to bilateral gluteal aspects.    Short term goals: Skin care/hygiene, skin protection, moisture prevention, pressure reduction.  Topical treatments.  Regain skin integrity.    Charlotte Dhillon RN    6/10/2024    15:29 EDT

## 2024-06-11 LAB
ALBUMIN SERPL-MCNC: 2.8 G/DL (ref 3.5–5.2)
ALP SERPL-CCNC: 73 U/L (ref 39–117)
ALT SERPL W P-5'-P-CCNC: 14 U/L (ref 1–33)
ANION GAP SERPL CALCULATED.3IONS-SCNC: 8.7 MMOL/L (ref 5–15)
AST SERPL-CCNC: 24 U/L (ref 1–32)
BACTERIA SPEC AEROBE CULT: ABNORMAL
BASOPHILS # BLD AUTO: 0.04 10*3/MM3 (ref 0–0.2)
BASOPHILS NFR BLD AUTO: 0.5 % (ref 0–1.5)
BILIRUB CONJ SERPL-MCNC: <0.2 MG/DL (ref 0–0.3)
BILIRUB INDIRECT SERPL-MCNC: ABNORMAL MG/DL
BILIRUB SERPL-MCNC: 0.5 MG/DL (ref 0–1.2)
BUN SERPL-MCNC: 14 MG/DL (ref 8–23)
BUN/CREAT SERPL: 22.2 (ref 7–25)
CALCIUM SPEC-SCNC: 8.2 MG/DL (ref 8.6–10.5)
CHLORIDE SERPL-SCNC: 110 MMOL/L (ref 98–107)
CO2 SERPL-SCNC: 27.3 MMOL/L (ref 22–29)
CREAT SERPL-MCNC: 0.63 MG/DL (ref 0.57–1)
DEPRECATED RDW RBC AUTO: 49.2 FL (ref 37–54)
EGFRCR SERPLBLD CKD-EPI 2021: 93.8 ML/MIN/1.73
EOSINOPHIL # BLD AUTO: 0.05 10*3/MM3 (ref 0–0.4)
EOSINOPHIL NFR BLD AUTO: 0.6 % (ref 0.3–6.2)
ERYTHROCYTE [DISTWIDTH] IN BLOOD BY AUTOMATED COUNT: 15.1 % (ref 12.3–15.4)
GLUCOSE BLDC GLUCOMTR-MCNC: 176 MG/DL (ref 70–99)
GLUCOSE BLDC GLUCOMTR-MCNC: 229 MG/DL (ref 70–99)
GLUCOSE BLDC GLUCOMTR-MCNC: 233 MG/DL (ref 70–99)
GLUCOSE BLDC GLUCOMTR-MCNC: 87 MG/DL (ref 70–99)
GLUCOSE SERPL-MCNC: 91 MG/DL (ref 65–99)
GRAM STN SPEC: ABNORMAL
HCT VFR BLD AUTO: 36.5 % (ref 34–46.6)
HGB BLD-MCNC: 11.5 G/DL (ref 12–15.9)
IMM GRANULOCYTES # BLD AUTO: 0.03 10*3/MM3 (ref 0–0.05)
IMM GRANULOCYTES NFR BLD AUTO: 0.3 % (ref 0–0.5)
ISOLATED FROM: ABNORMAL
LYMPHOCYTES # BLD AUTO: 2.7 10*3/MM3 (ref 0.7–3.1)
LYMPHOCYTES NFR BLD AUTO: 31.1 % (ref 19.6–45.3)
MAGNESIUM SERPL-MCNC: 2 MG/DL (ref 1.6–2.4)
MCH RBC QN AUTO: 27.8 PG (ref 26.6–33)
MCHC RBC AUTO-ENTMCNC: 31.5 G/DL (ref 31.5–35.7)
MCV RBC AUTO: 88.2 FL (ref 79–97)
MONOCYTES # BLD AUTO: 0.59 10*3/MM3 (ref 0.1–0.9)
MONOCYTES NFR BLD AUTO: 6.8 % (ref 5–12)
NEUTROPHILS NFR BLD AUTO: 5.27 10*3/MM3 (ref 1.7–7)
NEUTROPHILS NFR BLD AUTO: 60.7 % (ref 42.7–76)
NRBC BLD AUTO-RTO: 0 /100 WBC (ref 0–0.2)
PHOSPHATE SERPL-MCNC: 1.2 MG/DL (ref 2.5–4.5)
PLATELET # BLD AUTO: 212 10*3/MM3 (ref 140–450)
PMV BLD AUTO: 11.9 FL (ref 6–12)
POTASSIUM SERPL-SCNC: 2.8 MMOL/L (ref 3.5–5.2)
PROT SERPL-MCNC: 5.2 G/DL (ref 6–8.5)
QT INTERVAL: 378 MS
QTC INTERVAL: 502 MS
RBC # BLD AUTO: 4.14 10*6/MM3 (ref 3.77–5.28)
SODIUM SERPL-SCNC: 146 MMOL/L (ref 136–145)
WBC NRBC COR # BLD AUTO: 8.68 10*3/MM3 (ref 3.4–10.8)

## 2024-06-11 PROCEDURE — 82948 REAGENT STRIP/BLOOD GLUCOSE: CPT

## 2024-06-11 PROCEDURE — 80048 BASIC METABOLIC PNL TOTAL CA: CPT | Performed by: FAMILY MEDICINE

## 2024-06-11 PROCEDURE — 85025 COMPLETE CBC W/AUTO DIFF WBC: CPT | Performed by: FAMILY MEDICINE

## 2024-06-11 PROCEDURE — 25010000002 HEPARIN (PORCINE) PER 1000 UNITS: Performed by: FAMILY MEDICINE

## 2024-06-11 PROCEDURE — 84100 ASSAY OF PHOSPHORUS: CPT | Performed by: FAMILY MEDICINE

## 2024-06-11 PROCEDURE — 99232 SBSQ HOSP IP/OBS MODERATE 35: CPT | Performed by: FAMILY MEDICINE

## 2024-06-11 PROCEDURE — 80076 HEPATIC FUNCTION PANEL: CPT | Performed by: FAMILY MEDICINE

## 2024-06-11 PROCEDURE — 25010000002 POTASSIUM CHLORIDE 10 MEQ/100ML SOLUTION: Performed by: FAMILY MEDICINE

## 2024-06-11 PROCEDURE — 25810000003 SODIUM CHLORIDE 0.9 % SOLUTION: Performed by: FAMILY MEDICINE

## 2024-06-11 PROCEDURE — 63710000001 INSULIN LISPRO (HUMAN) PER 5 UNITS: Performed by: FAMILY MEDICINE

## 2024-06-11 PROCEDURE — 25010000002 CEFTRIAXONE PER 250 MG: Performed by: FAMILY MEDICINE

## 2024-06-11 PROCEDURE — 63710000001 INSULIN GLARGINE PER 5 UNITS: Performed by: FAMILY MEDICINE

## 2024-06-11 PROCEDURE — 82948 REAGENT STRIP/BLOOD GLUCOSE: CPT | Performed by: FAMILY MEDICINE

## 2024-06-11 PROCEDURE — 83735 ASSAY OF MAGNESIUM: CPT | Performed by: FAMILY MEDICINE

## 2024-06-11 RX ORDER — FENTANYL/ROPIVACAINE/NS/PF 2-625MCG/1
15 PLASTIC BAG, INJECTION (ML) EPIDURAL ONCE
Status: DISCONTINUED | OUTPATIENT
Start: 2024-06-11 | End: 2024-06-11

## 2024-06-11 RX ORDER — POTASSIUM CHLORIDE 7.45 MG/ML
10 INJECTION INTRAVENOUS
Status: COMPLETED | OUTPATIENT
Start: 2024-06-11 | End: 2024-06-11

## 2024-06-11 RX ORDER — FENTANYL/ROPIVACAINE/NS/PF 2-625MCG/1
15 PLASTIC BAG, INJECTION (ML) EPIDURAL
Status: DISPENSED | OUTPATIENT
Start: 2024-06-11 | End: 2024-06-12

## 2024-06-11 RX ADMIN — POTASSIUM CHLORIDE 10 MEQ: 7.46 INJECTION, SOLUTION INTRAVENOUS at 12:23

## 2024-06-11 RX ADMIN — HEPARIN SODIUM 5000 UNITS: 5000 INJECTION INTRAVENOUS; SUBCUTANEOUS at 09:16

## 2024-06-11 RX ADMIN — POTASSIUM CHLORIDE 10 MEQ: 7.46 INJECTION, SOLUTION INTRAVENOUS at 07:54

## 2024-06-11 RX ADMIN — INSULIN LISPRO 5 UNITS: 100 INJECTION, SOLUTION INTRAVENOUS; SUBCUTANEOUS at 18:54

## 2024-06-11 RX ADMIN — POTASSIUM PHOSPHATES 15 MMOL: 236; 224 INJECTION, SOLUTION INTRAVENOUS at 18:53

## 2024-06-11 RX ADMIN — POTASSIUM CHLORIDE 10 MEQ: 7.46 INJECTION, SOLUTION INTRAVENOUS at 14:15

## 2024-06-11 RX ADMIN — INSULIN LISPRO 4 UNITS: 100 INJECTION, SOLUTION INTRAVENOUS; SUBCUTANEOUS at 21:59

## 2024-06-11 RX ADMIN — BACITRACIN 0.9 G: 500 OINTMENT TOPICAL at 21:59

## 2024-06-11 RX ADMIN — ZINC OXIDE 1 APPLICATION: 200 OINTMENT TOPICAL at 04:00

## 2024-06-11 RX ADMIN — ZINC OXIDE 1 APPLICATION: 200 OINTMENT TOPICAL at 10:31

## 2024-06-11 RX ADMIN — BACITRACIN 0.9 G: 500 OINTMENT TOPICAL at 09:16

## 2024-06-11 RX ADMIN — HEPARIN SODIUM 5000 UNITS: 5000 INJECTION INTRAVENOUS; SUBCUTANEOUS at 21:58

## 2024-06-11 RX ADMIN — Medication 250 MG: at 09:16

## 2024-06-11 RX ADMIN — Medication 250 MG: at 21:58

## 2024-06-11 RX ADMIN — POTASSIUM PHOSPHATES 15 MMOL: 236; 224 INJECTION, SOLUTION INTRAVENOUS at 22:34

## 2024-06-11 RX ADMIN — CEFTRIAXONE SODIUM 1000 MG: 1 INJECTION, POWDER, FOR SOLUTION INTRAMUSCULAR; INTRAVENOUS at 16:03

## 2024-06-11 RX ADMIN — INSULIN GLARGINE 20 UNITS: 100 INJECTION, SOLUTION SUBCUTANEOUS at 21:59

## 2024-06-11 RX ADMIN — POTASSIUM CHLORIDE 10 MEQ: 7.46 INJECTION, SOLUTION INTRAVENOUS at 06:32

## 2024-06-11 RX ADMIN — ZINC OXIDE 1 APPLICATION: 200 OINTMENT TOPICAL at 16:24

## 2024-06-11 RX ADMIN — INSULIN LISPRO 3 UNITS: 100 INJECTION, SOLUTION INTRAVENOUS; SUBCUTANEOUS at 12:23

## 2024-06-11 RX ADMIN — ZINC OXIDE 1 APPLICATION: 200 OINTMENT TOPICAL at 21:59

## 2024-06-11 RX ADMIN — POTASSIUM CHLORIDE 10 MEQ: 7.46 INJECTION, SOLUTION INTRAVENOUS at 09:16

## 2024-06-11 RX ADMIN — POTASSIUM CHLORIDE 10 MEQ: 7.46 INJECTION, SOLUTION INTRAVENOUS at 10:30

## 2024-06-11 NOTE — PLAN OF CARE
Goal Outcome Evaluation:  Plan of Care Reviewed With: patient        Progress: improving  Outcome Evaluation: only able to say more than yes and no. given medication per mar. stated no when asked was in pain. vss

## 2024-06-11 NOTE — PLAN OF CARE
Goal Outcome Evaluation:   No major changes noted or reported during shift. Patient was more awake and alert and responded with yes and no and the occasional point to what she needed.

## 2024-06-11 NOTE — PROGRESS NOTES
[facility]   Hospitalist Progress Note  Date: 2024  Patient Name: Charis Hill  : 1950  MRN: [id number]  Date of admission: 2024      Subjective   Subjective     Chief Complaint: Altered mental status    Summary: 73-year-old female past medical history significant for diabetes, hypertension, hyperlipidemia, advanced dementia, aphasia, GERD presented to the ED via EMS for evaluation of altered mental status evidently patient has had worsening mental status increasing falls in the ED labs showed elevated CK elevated lactate leukocytosis hyperglycemia urinalysis positive for UTI CT scan of the head was negative admitted to the hospital for further evaluation treatment    Interval Followup: Patient seen and examined resting comfortably blood cultures 1 of 2 positive for coag negative staph suspect contaminant urine culture positive for yeast but given positive urinalysis with improvement of clinical status will continue treatment with ceftriaxone.      Objective   Objective     Vitals:   Temp:  [97.7 °F (36.5 °C)-98.6 °F (37 °C)] 98.6 °F (37 °C)  Heart Rate:  [67-82] 78  Resp:  [14-18] 16  BP: (124-139)/(52-63) 127/52    Physical Exam   Constitutional: Awake and more alert today   respiratory: Diminished with poor inspiratory effort  Cardiovascular: RRR  GI abdomen soft nontender  Extremities without edema    Result Review    Result Review:  I have personally reviewed the results from the time of this admission to 2024 12:22 EDT and agree with these findings:  []  Laboratory  []  Microbiology  []  Radiology  []  EKG/Telemetry   []  Cardiology/Vascular   []  Pathology  []  Old records  []  Other:    Assessment & Plan   Assessment / Plan     Assessment:    Urinary tract infection  Altered mental status  Dementia with aphasia  Hypokalemia  Diabetes with hyperglycemia  Hypertension  Advanced dementia    Plan:    Continue IV antibiotics with ceftriaxone given improvement in clinical status  DC IV  fluid  Replace phosphorus intravenously  Replace potassium  Recheck levels in a.m.  Monitor sodium level mentation improved oral intake improving suspect this should also improve  Basal insulin and SSI per protocol  PT OT speech therapy consultation  Palliative care consultation  Further treatment contingent upon her hospital course     Discussed plan with RN.    DVT prophylaxis:  Pharmacologic VTE prophylaxis orders are present.        CODE STATUS:   Level Of Support Discussed With: Patient; Health Care Surrogate  Code Status (Patient has no pulse and is not breathing): CPR (Attempt to Resuscitate)  Medical Interventions (Patient has pulse or is breathing): Full Support        Electronically signed by RACQUEL Blake, 06/11/24, 12:32 PM EDT.      Attending documentation:  I reviewed the above documentation and independently reviewed and rounded and evaluated the patient and discussed the care plan with WALI Herrera PA-C, I agree with his findings and plan as documented, what I have added to the care plan and modified is as follows in my documentation and my medical decision making; 73-year-old female with history of diabetes, dyslipidemia, hypertension, dementia, aphasia, hospitalized on 6/9/2024 for with altered mental status, concern for UTI, urine culture pending, placed on broad-spectrum antibiotics.  1 out of 2 blood cultures positive for coag negative staph.  Likely contamination of specimen.  Interval follow-up: Seen and examined this morning, no acute distress, no acute major night events, more alert and awake today, blood sugars trending better, 80s to 100s.  Phosphorus low at 1.2, potassium 2.8, sodium 146.  Replacement potassium and phosphorus ordered. Urine culture growing yeast.  1 out of 2 blood cultures positive, likely contamination of specimen.  Vitals reviewed, labs reviewed, physical exam elderly appearing female with some bruises to her face, nonverbal, regular rate rhythm, diminished  breath sounds, soft nontender abdomen, no lower extremity edema.  Assessment as above acute metabolic encephalopathy due to UTI with underlying dementia and aphasia, plan, continue ceftriaxone, follow-up urine culture so far only growing yeast, IV potassium and phosphorus replacement, trending white blood cell count, insulin sliding scale coverage, a.m. labs, full code, DVT prophylaxis with heparin, clinical course to dictate further management, discussed with nurse at bedside.  More than 65 % of the time of this patient's encounter was performed by me, this included face-to-face time, planning and coordinating, medical decision making and critical thinking personally done by me.       Electronically signed by Km Ambriz MD, 06/11/24, 3:34 PM EDT.  Portions of this documentation were transcribed electronically from a voice recognition software.  I confirm all data accurately represents the service(s) I performed at today's visit.

## 2024-06-12 LAB
GLUCOSE BLDC GLUCOMTR-MCNC: 151 MG/DL (ref 70–99)
GLUCOSE BLDC GLUCOMTR-MCNC: 229 MG/DL (ref 70–99)
GLUCOSE BLDC GLUCOMTR-MCNC: 235 MG/DL (ref 70–99)
GLUCOSE BLDC GLUCOMTR-MCNC: 85 MG/DL (ref 70–99)

## 2024-06-12 PROCEDURE — 25010000002 CEFTRIAXONE PER 250 MG: Performed by: FAMILY MEDICINE

## 2024-06-12 PROCEDURE — 99232 SBSQ HOSP IP/OBS MODERATE 35: CPT | Performed by: FAMILY MEDICINE

## 2024-06-12 PROCEDURE — 63710000001 INSULIN LISPRO (HUMAN) PER 5 UNITS: Performed by: FAMILY MEDICINE

## 2024-06-12 PROCEDURE — 82948 REAGENT STRIP/BLOOD GLUCOSE: CPT | Performed by: FAMILY MEDICINE

## 2024-06-12 PROCEDURE — 82948 REAGENT STRIP/BLOOD GLUCOSE: CPT

## 2024-06-12 PROCEDURE — 25010000002 HEPARIN (PORCINE) PER 1000 UNITS: Performed by: FAMILY MEDICINE

## 2024-06-12 PROCEDURE — 63710000001 INSULIN GLARGINE PER 5 UNITS: Performed by: FAMILY MEDICINE

## 2024-06-12 RX ORDER — ACETAMINOPHEN 325 MG/1
650 TABLET ORAL EVERY 4 HOURS PRN
Status: DISCONTINUED | OUTPATIENT
Start: 2024-06-12 | End: 2024-06-13 | Stop reason: HOSPADM

## 2024-06-12 RX ADMIN — ZINC OXIDE 1 APPLICATION: 200 OINTMENT TOPICAL at 21:19

## 2024-06-12 RX ADMIN — ACETAMINOPHEN 650 MG: 325 TABLET ORAL at 21:38

## 2024-06-12 RX ADMIN — HEPARIN SODIUM 5000 UNITS: 5000 INJECTION INTRAVENOUS; SUBCUTANEOUS at 08:48

## 2024-06-12 RX ADMIN — ZINC OXIDE 1 APPLICATION: 200 OINTMENT TOPICAL at 09:46

## 2024-06-12 RX ADMIN — Medication 250 MG: at 21:19

## 2024-06-12 RX ADMIN — INSULIN LISPRO 5 UNITS: 100 INJECTION, SOLUTION INTRAVENOUS; SUBCUTANEOUS at 18:18

## 2024-06-12 RX ADMIN — CEFTRIAXONE SODIUM 1000 MG: 1 INJECTION, POWDER, FOR SOLUTION INTRAMUSCULAR; INTRAVENOUS at 18:18

## 2024-06-12 RX ADMIN — INSULIN GLARGINE 20 UNITS: 100 INJECTION, SOLUTION SUBCUTANEOUS at 21:18

## 2024-06-12 RX ADMIN — BACITRACIN 0.9 G: 500 OINTMENT TOPICAL at 08:48

## 2024-06-12 RX ADMIN — ZINC OXIDE 1 APPLICATION: 200 OINTMENT TOPICAL at 18:18

## 2024-06-12 RX ADMIN — BACITRACIN 0.9 G: 500 OINTMENT TOPICAL at 21:19

## 2024-06-12 RX ADMIN — Medication 250 MG: at 08:48

## 2024-06-12 RX ADMIN — HEPARIN SODIUM 5000 UNITS: 5000 INJECTION INTRAVENOUS; SUBCUTANEOUS at 21:18

## 2024-06-12 RX ADMIN — INSULIN LISPRO 5 UNITS: 100 INJECTION, SOLUTION INTRAVENOUS; SUBCUTANEOUS at 21:18

## 2024-06-12 RX ADMIN — Medication 10 ML: at 21:19

## 2024-06-12 NOTE — PROGRESS NOTES
[facility]   Hospitalist Progress Note  Date: 2024  Patient Name: Charis Hill  : 1950  MRN: [id number]  Date of admission: 2024      Subjective   Subjective     Chief complaint: Altered mental status    Summary:  73-year-old female with history of diabetes, dyslipidemia, hypertension, dementia, aphasia, hospitalized on 2024 for with altered mental status, concern for UTI, urine culture pending, placed on broad-spectrum antibiotics.  1 out of 2 blood cultures positive for coag negative staph.  Likely contamination of specimen.  Continue on ceftriaxone with improvements in mentation and response to treatment despite yeast only growing in blood cultures.  Needs rehab placement.  APS report filed.  Met criteria for APS report.    Interval follow-up: Seen and examined this morning, no acute distress, no acute major night events, fed herself.  More alert and awake, responds to yes or no, slow answers.  Unable to follow commands.  Labs not drawn today.  Blood sugars in the 100-200 range.    Review of systems:  Unable to obtain due to aphasia    Objective   Objective     Vitals:   Temp:  [97.3 °F (36.3 °C)-98.7 °F (37.1 °C)] 98.1 °F (36.7 °C)  Heart Rate:  [71-91] 72  Resp:  [16-20] 16  BP: (122-142)/(60-74) 140/68    Physical Exam             Constitutional: Awake and more alert today laying in bed  respiratory: Diminished with poor inspiratory effort  Cardiovascular: RRR  GI abdomen soft nontender  Extremities without edema    Result Review    Result Review:  I have personally reviewed the pertinent results from the past 24 hours to 2024 15:35 EDT and agree with these findings:  [x]  Laboratory   CBC          2024    13:38 6/10/2024    01:21 2024    05:17   CBC   WBC 14.03  13.29  8.68    RBC 5.93  4.90  4.14    Hemoglobin 16.7  13.6  11.5    Hematocrit 51.2  43.4  36.5    MCV 86.3  88.6  88.2    MCH 28.2  27.8  27.8    MCHC 32.6  31.3  31.5    RDW 16.0  15.3  15.1    Platelets  347  268  212      BMP          6/9/2024    14:15 6/10/2024    01:21 6/11/2024    05:17   BMP   BUN 21  20  14    Creatinine 1.16  0.89  0.63    Sodium 143  146  146    Potassium 3.6  3.2  2.8    Chloride 98  108  110    CO2 22.8  23.0  27.3    Calcium 9.8  8.6  8.2      LIVER FUNCTION TESTS:      Lab 06/11/24  0517 06/09/24  1415   TOTAL PROTEIN 5.2* 7.4   ALBUMIN 2.8* 3.8   GLOBULIN  --  3.6   ALT (SGPT) 14 18   AST (SGOT) 24 27   BILIRUBIN 0.5 0.8   BILIRUBIN DIRECT <0.2  --    ALK PHOS 73 110   LIPASE  --  18       [x]  Microbiology   Microbiology Results (last 10 days)       Procedure Component Value - Date/Time    Urine Culture - Urine, Straight Cath [[id number]]  (Abnormal) Collected: 06/09/24 1349    Lab Status: Final result Specimen: Urine from Straight Cath Updated: 06/10/24 1316     Urine Culture Yeast isolated    Narrative:      Colonization of the urinary tract without infection is common. Treatment is discouraged unless the patient is symptomatic, pregnant, or undergoing an invasive urologic procedure.    Blood Culture - Blood, Arm, Left [[id number]]  (Normal) Collected: 06/09/24 1338    Lab Status: Preliminary result Specimen: Blood from Arm, Left Updated: 06/12/24 1401     Blood Culture No growth at 3 days    Blood Culture - Blood, Arm, Right [[id number]]  (Abnormal) Collected: 06/09/24 1338    Lab Status: Final result Specimen: Blood from Arm, Right Updated: 06/11/24 0742     Blood Culture Staphylococcus, coagulase negative     Isolated from Anaerobic Bottle     Gram Stain Anaerobic Bottle Gram positive cocci in pairs and clusters    Narrative:      Probable contaminant requires clinical correlation, susceptibility not performed unless requested by physician.      Blood Culture ID, PCR - Blood, Arm, Right [[id number]]  (Abnormal) Collected: 06/09/24 1338    Lab Status: Final result Specimen: Blood from Arm, Right Updated: 06/10/24 1029     BCID, PCR Staph spp, not aureus or lugdunensis. Identification  by BCID2 PCR.     BOTTLE TYPE Anaerobic Bottle    Narrative:      mecA/C              [x]  Radiology CT Head Without Contrast    Result Date: 6/9/2024  Impression: 1. No acute intracranial abnormality. 2. Stable chronic findings above. Electronically Signed: Earl Cowart MD  6/9/2024 4:22 PM EDT  Workstation ID: [id number]    XR Chest 1 View    Result Date: 6/9/2024  Impression: No active disease is seen. Electronically Signed: Jeremiah Caba MD  6/9/2024 3:38 PM EDT  Workstation ID: [id number]       [x]  EKG/Telemetry   ECG 12 Lead ED Triage Standing Order; Weak / Dizzy / AMS   Final Result   HEART RATE= 106  bpm   RR Interval= 568  ms   VT Interval= 180  ms   P Horizontal Axis= -10  deg   P Front Axis= 109  deg   QRSD Interval= 85  ms   QT Interval= 378  ms   QTcB= 502  ms   QRS Axis= 237  deg   T Wave Axis= 151  deg   - ABNORMAL ECG -   Right and left arm electrode reversal, interpretation assumes no reversal   Sinus tachycardia   Probable left atrial enlargement   Inferolateral infarct, old   Prolonged QT interval   No previous ECG available for comparison   Electronically Signed By: Case Salgado ([facility]) 11-Jun-2024 17:14:14   Date and Time of Study: 2024-06-09 13:26:02          [x]  Cardiology/Vascular   []  Pathology  [x]  Old records  []  Other:    Assessment & Plan   Assessment / Plan     Assessment/Plan:    Assessment:  Urinary tract infection suspect gram-negative organism  Altered mental status due to acute metabolic encephalopathy due to UTI  Dementia with aphasia  Hypokalemia  Diabetes with hyperglycemia  Hypertension  Advanced dementia     Plan:  Labs and imaging reviewed  Continue IV antibiotics with ceftriaxone given improvement in clinical status; delivery 5 days  Labs tomorrow  Follow-up outcome of APS case  Basal insulin and SSI per protocol  PT OT speech therapy consultation  Palliative care consultation  Further treatment contingent upon her hospital course  And labs  Full code  DVT prophylaxis with  heparin  Clinical course dictate further management  Discussed plan with RN.    VTE Prophylaxis:  Pharmacologic VTE prophylaxis orders are present.        CODE STATUS:   Level Of Support Discussed With: Patient; Health Care Surrogate  Code Status (Patient has no pulse and is not breathing): CPR (Attempt to Resuscitate)  Medical Interventions (Patient has pulse or is breathing): Full Support        Electronically signed by Km Ambriz MD, 6/12/2024, 15:35 EDT.    Portions of this documentation were transcribed electronically from a voice recognition software.  I confirm all data accurately represents the service(s) I performed at today's visit.

## 2024-06-12 NOTE — PLAN OF CARE
Goal Outcome Evaluation:  Plan of Care Reviewed With: patient        Progress: improving  Outcome Evaluation: was more responsive today, understood and answered yes and no questions. was able to follow commands when asked. vss. responded no when asked if in pain.

## 2024-06-12 NOTE — PLAN OF CARE
Goal Outcome Evaluation:  Plan of Care Reviewed With: patient        Progress: improving  Outcome Evaluation: No major changes noted or reported during shift. Patient was more awake and alert and responded with yes and no and the occasional point to what she needed.

## 2024-06-13 VITALS
HEART RATE: 72 BPM | SYSTOLIC BLOOD PRESSURE: 159 MMHG | TEMPERATURE: 98.6 F | RESPIRATION RATE: 18 BRPM | BODY MASS INDEX: 27 KG/M2 | WEIGHT: 167.99 LBS | DIASTOLIC BLOOD PRESSURE: 76 MMHG | HEIGHT: 66 IN | OXYGEN SATURATION: 96 %

## 2024-06-13 LAB
ALBUMIN SERPL-MCNC: 2.8 G/DL (ref 3.5–5.2)
ALP SERPL-CCNC: 80 U/L (ref 39–117)
ALT SERPL W P-5'-P-CCNC: 17 U/L (ref 1–33)
ANION GAP SERPL CALCULATED.3IONS-SCNC: 9.4 MMOL/L (ref 5–15)
AST SERPL-CCNC: 23 U/L (ref 1–32)
BASOPHILS # BLD AUTO: 0.05 10*3/MM3 (ref 0–0.2)
BASOPHILS NFR BLD AUTO: 0.6 % (ref 0–1.5)
BILIRUB CONJ SERPL-MCNC: <0.2 MG/DL (ref 0–0.3)
BILIRUB INDIRECT SERPL-MCNC: ABNORMAL MG/DL
BILIRUB SERPL-MCNC: 0.3 MG/DL (ref 0–1.2)
BUN SERPL-MCNC: 6 MG/DL (ref 8–23)
BUN/CREAT SERPL: 11.1 (ref 7–25)
CALCIUM SPEC-SCNC: 8.4 MG/DL (ref 8.6–10.5)
CHLORIDE SERPL-SCNC: 105 MMOL/L (ref 98–107)
CO2 SERPL-SCNC: 26.6 MMOL/L (ref 22–29)
CREAT SERPL-MCNC: 0.54 MG/DL (ref 0.57–1)
DEPRECATED RDW RBC AUTO: 47.2 FL (ref 37–54)
EGFRCR SERPLBLD CKD-EPI 2021: 97.4 ML/MIN/1.73
EOSINOPHIL # BLD AUTO: 0.1 10*3/MM3 (ref 0–0.4)
EOSINOPHIL NFR BLD AUTO: 1.1 % (ref 0.3–6.2)
ERYTHROCYTE [DISTWIDTH] IN BLOOD BY AUTOMATED COUNT: 14.7 % (ref 12.3–15.4)
GLUCOSE BLDC GLUCOMTR-MCNC: 151 MG/DL (ref 70–99)
GLUCOSE BLDC GLUCOMTR-MCNC: 86 MG/DL (ref 70–99)
GLUCOSE SERPL-MCNC: 89 MG/DL (ref 65–99)
HCT VFR BLD AUTO: 41 % (ref 34–46.6)
HGB BLD-MCNC: 13 G/DL (ref 12–15.9)
IMM GRANULOCYTES # BLD AUTO: 0.05 10*3/MM3 (ref 0–0.05)
IMM GRANULOCYTES NFR BLD AUTO: 0.6 % (ref 0–0.5)
LYMPHOCYTES # BLD AUTO: 2.43 10*3/MM3 (ref 0.7–3.1)
LYMPHOCYTES NFR BLD AUTO: 26.8 % (ref 19.6–45.3)
MAGNESIUM SERPL-MCNC: 2 MG/DL (ref 1.6–2.4)
MCH RBC QN AUTO: 27.8 PG (ref 26.6–33)
MCHC RBC AUTO-ENTMCNC: 31.7 G/DL (ref 31.5–35.7)
MCV RBC AUTO: 87.6 FL (ref 79–97)
MONOCYTES # BLD AUTO: 0.71 10*3/MM3 (ref 0.1–0.9)
MONOCYTES NFR BLD AUTO: 7.8 % (ref 5–12)
NEUTROPHILS NFR BLD AUTO: 5.74 10*3/MM3 (ref 1.7–7)
NEUTROPHILS NFR BLD AUTO: 63.1 % (ref 42.7–76)
NRBC BLD AUTO-RTO: 0 /100 WBC (ref 0–0.2)
PHOSPHATE SERPL-MCNC: 2.9 MG/DL (ref 2.5–4.5)
PLATELET # BLD AUTO: 225 10*3/MM3 (ref 140–450)
PMV BLD AUTO: 11.6 FL (ref 6–12)
POTASSIUM SERPL-SCNC: 3.5 MMOL/L (ref 3.5–5.2)
PROT SERPL-MCNC: 5.6 G/DL (ref 6–8.5)
RBC # BLD AUTO: 4.68 10*6/MM3 (ref 3.77–5.28)
SODIUM SERPL-SCNC: 141 MMOL/L (ref 136–145)
WBC NRBC COR # BLD AUTO: 9.08 10*3/MM3 (ref 3.4–10.8)

## 2024-06-13 PROCEDURE — 92526 ORAL FUNCTION THERAPY: CPT

## 2024-06-13 PROCEDURE — 80048 BASIC METABOLIC PNL TOTAL CA: CPT | Performed by: FAMILY MEDICINE

## 2024-06-13 PROCEDURE — 82948 REAGENT STRIP/BLOOD GLUCOSE: CPT | Performed by: FAMILY MEDICINE

## 2024-06-13 PROCEDURE — 99239 HOSP IP/OBS DSCHRG MGMT >30: CPT | Performed by: FAMILY MEDICINE

## 2024-06-13 PROCEDURE — 84100 ASSAY OF PHOSPHORUS: CPT | Performed by: FAMILY MEDICINE

## 2024-06-13 PROCEDURE — 83735 ASSAY OF MAGNESIUM: CPT | Performed by: FAMILY MEDICINE

## 2024-06-13 PROCEDURE — 25010000002 HEPARIN (PORCINE) PER 1000 UNITS: Performed by: FAMILY MEDICINE

## 2024-06-13 PROCEDURE — 80076 HEPATIC FUNCTION PANEL: CPT | Performed by: FAMILY MEDICINE

## 2024-06-13 PROCEDURE — 85025 COMPLETE CBC W/AUTO DIFF WBC: CPT | Performed by: FAMILY MEDICINE

## 2024-06-13 RX ORDER — INSULIN LISPRO 100 [IU]/ML
3-14 INJECTION, SOLUTION INTRAVENOUS; SUBCUTANEOUS
Qty: 3 ML | Refills: 0 | Status: SHIPPED | OUTPATIENT
Start: 2024-06-13

## 2024-06-13 RX ORDER — GINSENG 100 MG
1 CAPSULE ORAL DAILY
Qty: 14.2 G | Refills: 0 | Status: SHIPPED | OUTPATIENT
Start: 2024-06-13

## 2024-06-13 RX ORDER — AMLODIPINE BESYLATE 5 MG/1
5 TABLET ORAL DAILY
Qty: 30 TABLET | Refills: 0 | Status: SHIPPED | OUTPATIENT
Start: 2024-06-13 | End: 2024-07-13

## 2024-06-13 RX ORDER — BENAZEPRIL HYDROCHLORIDE 10 MG/1
10 TABLET ORAL DAILY
Qty: 30 TABLET | Refills: 0 | Status: SHIPPED | OUTPATIENT
Start: 2024-06-13 | End: 2024-07-13

## 2024-06-13 RX ORDER — SACCHAROMYCES BOULARDII 250 MG
250 CAPSULE ORAL 2 TIMES DAILY
Qty: 60 CAPSULE | Refills: 0 | Status: SHIPPED | OUTPATIENT
Start: 2024-06-13 | End: 2024-07-13

## 2024-06-13 RX ORDER — DIAPER,BRIEF,INFANT-TODD,DISP
1 EACH MISCELLANEOUS DAILY
Qty: 14.2 G | Refills: 0 | Status: SHIPPED | OUTPATIENT
Start: 2024-06-13 | End: 2024-06-13 | Stop reason: HOSPADM

## 2024-06-13 RX ADMIN — BACITRACIN 0.9 G: 500 OINTMENT TOPICAL at 08:53

## 2024-06-13 RX ADMIN — ZINC OXIDE 1 APPLICATION: 200 OINTMENT TOPICAL at 08:53

## 2024-06-13 RX ADMIN — HEPARIN SODIUM 5000 UNITS: 5000 INJECTION INTRAVENOUS; SUBCUTANEOUS at 08:53

## 2024-06-13 RX ADMIN — Medication 250 MG: at 08:53

## 2024-06-13 RX ADMIN — Medication 10 ML: at 08:54

## 2024-06-13 RX ADMIN — ZINC OXIDE 1 APPLICATION: 200 OINTMENT TOPICAL at 04:58

## 2024-06-13 NOTE — PLAN OF CARE
Goal Outcome Evaluation:  Plan of Care Reviewed With: patient        Progress: no change  Outcome Evaluation: able to answer yes and no questions. called out a few times yelling. responded yes when asked if was in pain and was able to point to head. got prn medication ordered. dong had leak, checked balloon only had 7 ml in it, refilled to 11 ml. vss.

## 2024-06-13 NOTE — THERAPY TREATMENT NOTE
Acute Care - Speech Language Pathology   Swallow Treatment Note CARA Bateman     Patient Name: Charis Hill  : 1950  MRN: [id number]  Today's Date: 2024               Admit Date: 2024    Visit Dx:     ICD-10-CM ICD-9-CM   1. Acute UTI  N39.0 599.0   2. Difficulty walking  R26.2 719.7   3. Hypertension, unspecified type  I10 401.9   4. Other secondary hypertension  I15.8 405.99     Patient Active Problem List   Diagnosis    Diabetes mellitus, type II, insulin dependent    Hypertension    Hyperlipidemia    Advance directive declined by patient    Speech disturbance    Stroke    TORSTEN (acute kidney injury)    Acute UTI    Generalized weakness    Altered mental status     Past Medical History:   Diagnosis Date    Advance directive declined by patient 2020    Aphagia 2019    Diabetes mellitus, type II     Glaucoma     Hyperlipidemia     Hypertension     Insulin dependent type 2 diabetes mellitus, uncontrolled 2020    Medication management 2020     History reviewed. No pertinent surgical history.  SPEECH PATHOLOGY DYSPHAGIA TREATMENT    Subjective/Behavioral Observations: Patient seen for dysphagia therapy      Current Diet:Puree Thin Liquids      Treatment received: Patient seen at morning meal.  Tolerating puréed diet and thin liquids without overt clinical sign or symptom of aspiration.  Challenged with large sequential straw trials with wet voicing and delayed cough noted.  But tolerates small single sips well.  No oral residue noted after the swallow, appears to have good laryngeal elevation per palpation.  Denies globus sensation after completion of swallow.  Good intake        Results of treatment: As stated        Progress toward goals: Progress noted        Barriers to Achieving goals: Medical status        Plan of care:/changes in plan: Continue puréed diet-thin liquids  90 degrees upright for all intake  Slow rate, small bites/drinks  Oral meds whole in  applesauce        EDUCATION  The patient has been educated in the following areas:   Dysphagia (Swallowing Impairment).       Grace Molina, SLP  6/13/2024

## 2024-06-13 NOTE — PLAN OF CARE
Goal Outcome Evaluation:  Plan of Care Reviewed With: patient      VSS. Report called to GENEVIEVE Messer at [facility]. Family notified of discharge. IV removed, tip intact.

## 2024-06-13 NOTE — DISCHARGE SUMMARY
[facility]         HOSPITALIST  DISCHARGE SUMMARY    Patient Name: Charis Hill  : 1950  MRN: [id number]    Date of Admission: 2024  Date of Discharge:  2024    Primary Care Physician: Jaime Quintanilla MD    Consults       Date and Time Order Name Status Description    2024  4:34 PM Inpatient Hospitalist Consult              Active and Resolved Hospital Problems:    Urinary tract infection organism unspecified  Altered mental status due to acute metabolic encephalopathy due to UTI  Dementia with aphasia  Hypokalemia  Diabetes with hyperglycemia  Hypertension  Advanced dementia       Active Hospital Problems    Diagnosis POA    **Generalized weakness [R53.1] Yes    Altered mental status [R41.82] Unknown    Acute UTI [N39.0] Yes    Speech disturbance [R47.9] Yes    Hypertension [I10] Yes    Hyperlipidemia [E78.5] Yes    Advance directive declined by patient [Z78.9] Yes    Diabetes mellitus, type II, insulin dependent [E11.9, Z79.4] Not Applicable      Resolved Hospital Problems   No resolved problems to display.       Hospital Course     Hospital Course:  73-year-old female with history of diabetes, dyslipidemia, hypertension, dementia, aphasia, hospitalized on 2024 for with altered mental status, concern for UTI, urine culture pending, placed on broad-spectrum antibiotics.  1 out of 2 blood cultures positive for coag negative staph.  Likely contamination of specimen.  Continue on ceftriaxone with improvements in mentation and response to treatment despite yeast only growing in blood cultures.  Needs rehab placement.  APS report filed.  Met criteria for APS report.  Secured alternate disposition.  Discharged in hemodynamically stable condition on 2024, to follow-up with PCP at nursing facility.  Recommend discontinuation of Le catheter at nursing facility after 2 to 3 days.  Watch for urinary retention as outpatient.    Day of Discharge     Vital  Signs:  Temp:  [97.3 °F (36.3 °C)-98.7 °F (37.1 °C)] 98.6 °F (37 °C)  Heart Rate:  [72-91] 72  Resp:  [18-22] 18  BP: (141-161)/(63-84) 159/76    Review of systems:  Unable to obtain due to aphasia    Physical Exam             Constitutional: Awake and more alert today laying in bed  respiratory: Diminished with poor inspiratory effort  Cardiovascular: RRR  GI abdomen soft nontender  Extremities without edema          Discharge Details        Discharge Medications        New Medications        Instructions Start Date   bacitracin 500 UNIT/GM ointment   0.9 g, Topical, Daily      insulin glargine 100 UNIT/ML injection  Commonly known as: LANTUS, SEMGLEE   10 Units, Subcutaneous, Nightly      Insulin Lispro 100 UNIT/ML injection  Commonly known as: humaLOG   3-14 Units, Subcutaneous, 4 Times Daily Before Meals & Nightly, Blood Glucose 150-199 mg/dL - 3 units Blood Glucose 200-249 mg/dL - 5 units Blood Glucose 250-299 mg/dL - 8 units Blood Glucose 300-349 mg/dL - 10 units Blood Glucose 350-400 mg/dL - 12 units Blood Glucose Greater Than 400 mg/dL - 14 units & Call Provider      saccharomyces boulardii 250 MG capsule  Commonly known as: FLORASTOR   250 mg, Oral, 2 Times Daily             Changes to Medications        Instructions Start Date   amLODIPine 5 MG tablet  Commonly known as: NORVASC  What changed:   medication strength  how much to take   5 mg, Oral, Daily      benazepril 10 MG tablet  Commonly known as: LOTENSIN  What changed:   medication strength  how much to take   10 mg, Oral, Daily             Continue These Medications        Instructions Start Date   aspirin 81 MG EC tablet   81 mg, Oral, Daily      metFORMIN  MG 24 hr tablet  Commonly known as: GLUCOPHAGE-XR   1,000 mg, Oral, Daily With Breakfast      rosuvastatin 5 MG tablet  Commonly known as: CRESTOR   5 mg, Oral, Daily             Stop These Medications      cloNIDine 0.1 MG tablet  Commonly known as: CATAPRES     hydroCHLOROthiazide 25 MG  tablet              No Known Allergies    Discharge Disposition:  Skilled Nursing Facility (DC - External)    Diet:  Hospital:  Diet Order   Procedures    Diet: Regular/House; Texture: Pureed (NDD 1); Fluid Consistency: Thin (IDDSI 0)       Discharge Activity: as tolerates      CODE STATUS:  Code Status and Medical Interventions:   Ordered at: 06/09/24 1811     Level Of Support Discussed With:    Patient    Health Care Surrogate     Code Status (Patient has no pulse and is not breathing):    CPR (Attempt to Resuscitate)     Medical Interventions (Patient has pulse or is breathing):    Full Support         No future appointments.    Additional Instructions for the Follow-ups that You Need to Schedule       Discharge Follow-up with PCP   As directed       Currently Documented PCP:    Jaime Quintanilla MD    PCP Phone Number:    [phone]     Follow Up Details: 3 to 7 days                Pertinent  and/or Most Recent Results     PROCEDURES:   CT Head Without Contrast    Result Date: 6/9/2024  CT HEAD WO CONTRAST Date of Exam: 6/9/2024 3:40 PM EDT Indication: AMS. Comparison: MRI brain 4/25/2022, CT head without contrast 8/2/2019 Technique: Axial CT images were obtained of the head without contrast administration.  Reconstructed coronal and sagittal images were also obtained. Automated exposure control and iterative construction methods were used. Findings: No intracranial hemorrhage. Mild cerebral volume loss. Mild white matter findings suggesting chronic microvascular disease. No midline shift or mass effect. Posterior fossa without acute abnormality. The mastoid air cells are well-aerated. Visualized sinuses are well-aerated. Small mount of fluid in the posterior left ethmoid air cells. No calvarial fracture.     Impression: 1. No acute intracranial abnormality. 2. Stable chronic findings above. Electronically Signed: Earl Cowart MD  6/9/2024 4:22 PM EDT  Workstation ID: [id number]    XR Chest 1  View    Result Date: 6/9/2024  XR CHEST 1 VW Date of Exam: 6/9/2024 3:16 PM EDT Indication: Cough, persistent Comparison: None available. Findings: The heart is normal in size. The lungs are well-expanded and free of infiltrates. Bony structures appear intact.     Impression: No active disease is seen. Electronically Signed: Jeremiah Caba MD  6/9/2024 3:38 PM EDT  Workstation ID: [id number]       LAB RESULTS:      Lab 06/13/24  0502 06/11/24  0517 06/10/24  0827 06/10/24  0121 06/09/24  2159 06/09/24  1602 06/09/24  1338   WBC 9.08 8.68  --  13.29*  --   --  14.03*   HEMOGLOBIN 13.0 11.5*  --  13.6  --   --  16.7*   HEMATOCRIT 41.0 36.5  --  43.4  --   --  51.2*   PLATELETS 225 212  --  268  --   --  347   NEUTROS ABS 5.74 5.27  --   --   --   --  11.34*   IMMATURE GRANS (ABS) 0.05 0.03  --   --   --   --  0.06*   LYMPHS ABS 2.43 2.70  --   --   --   --  1.78   MONOS ABS 0.71 0.59  --   --   --   --  0.83   EOS ABS 0.10 0.05  --   --   --   --  0.00   MCV 87.6 88.2  --  88.6  --   --  86.3   LACTATE  --   --  1.6 2.5* 2.7* 2.8* 3.7*         Lab 06/13/24  0502 06/11/24  0517 06/10/24  0121 06/09/24  1415 06/09/24  1338   SODIUM 141 146* 146* 143  --    POTASSIUM 3.5 2.8* 3.2* 3.6  --    CHLORIDE 105 110* 108* 98  --    CO2 26.6 27.3 23.0 22.8  --    ANION GAP 9.4 8.7 15.0 22.2*  --    BUN 6* 14 20 21  --    CREATININE 0.54* 0.63 0.89 1.16*  --    EGFR 97.4 93.8 68.6 49.9*  --    GLUCOSE 89 91 203* 442*  --    CALCIUM 8.4* 8.2* 8.6 9.8  --    MAGNESIUM 2.0 2.0  --  2.3  --    PHOSPHORUS 2.9 1.2*  --   --   --    HEMOGLOBIN A1C  --   --   --   --  11.20*         Lab 06/13/24  0502 06/11/24  0517 06/09/24  1415   TOTAL PROTEIN 5.6* 5.2* 7.4   ALBUMIN 2.8* 2.8* 3.8   GLOBULIN  --   --  3.6   ALT (SGPT) 17 14 18   AST (SGOT) 23 24 27   BILIRUBIN 0.3 0.5 0.8   BILIRUBIN DIRECT <0.2 <0.2  --    ALK PHOS 80 73 110   LIPASE  --   --  18         Lab 06/09/24  1602 06/09/24  1415   HSTROP T 30* 32*                 Brief Urine  Lab Results  (Last result in the past 365 days)        Color   Clarity   Blood   Leuk Est   Nitrite   Protein   CREAT   Urine HCG        06/09/24 1349 Yellow   Turbid   Moderate (2+)   Moderate (2+)   Negative   30 mg/dL (1+)                 Microbiology Results (last 10 days)       Procedure Component Value - Date/Time    Urine Culture - Urine, Straight Cath [[id number]]  (Abnormal) Collected: 06/09/24 1349    Lab Status: Final result Specimen: Urine from Straight Cath Updated: 06/10/24 1316     Urine Culture Yeast isolated    Narrative:      Colonization of the urinary tract without infection is common. Treatment is discouraged unless the patient is symptomatic, pregnant, or undergoing an invasive urologic procedure.    Blood Culture - Blood, Arm, Left [[id number]]  (Normal) Collected: 06/09/24 1338    Lab Status: Preliminary result Specimen: Blood from Arm, Left Updated: 06/13/24 1400     Blood Culture No growth at 4 days    Blood Culture - Blood, Arm, Right [[id number]]  (Abnormal) Collected: 06/09/24 1338    Lab Status: Final result Specimen: Blood from Arm, Right Updated: 06/11/24 0742     Blood Culture Staphylococcus, coagulase negative     Isolated from Anaerobic Bottle     Gram Stain Anaerobic Bottle Gram positive cocci in pairs and clusters    Narrative:      Probable contaminant requires clinical correlation, susceptibility not performed unless requested by physician.      Blood Culture ID, PCR - Blood, Arm, Right [[id number]]  (Abnormal) Collected: 06/09/24 1338    Lab Status: Final result Specimen: Blood from Arm, Right Updated: 06/10/24 1029     BCID, PCR Staph spp, not aureus or lugdunensis. Identification by BCID2 PCR.     BOTTLE TYPE Anaerobic Bottle    Narrative:      mecA/C            CT Head Without Contrast    Result Date: 6/9/2024  Impression: Impression: 1. No acute intracranial abnormality. 2. Stable chronic findings above. Electronically Signed: Earl Cowart MD  6/9/2024 4:22 PM EDT  Workstation  ID: [id number]    XR Chest 1 View    Result Date: 6/9/2024  Impression: Impression: No active disease is seen. Electronically Signed: Jeremiah Caba MD  6/9/2024 3:38 PM EDT  Workstation ID: [id number]                 Labs Pending at Discharge:  Pending Labs       Order Current Status    Blood Culture - Blood, Arm, Left Preliminary result              Time spent on Discharge including face to face service:  35 minutes    Electronically signed by Km Ambriz MD, 06/13/24, 2:41 PM EDT.    Portions of this documentation were transcribed electronically from a voice recognition software.  I confirm all data accurately represents the service(s) I performed at today's visit.

## 2024-06-14 LAB — BACTERIA SPEC AEROBE CULT: NORMAL

## 2024-07-03 DIAGNOSIS — Z79.4 TYPE 2 DIABETES MELLITUS WITH HYPERGLYCEMIA, WITH LONG-TERM CURRENT USE OF INSULIN: ICD-10-CM

## 2024-07-03 DIAGNOSIS — E11.65 TYPE 2 DIABETES MELLITUS WITH HYPERGLYCEMIA, WITH LONG-TERM CURRENT USE OF INSULIN: ICD-10-CM

## 2024-07-03 RX ORDER — METFORMIN HYDROCHLORIDE 500 MG/1
1000 TABLET, EXTENDED RELEASE ORAL
Qty: 180 TABLET | Refills: 3 | OUTPATIENT
Start: 2024-07-03

## 2024-12-23 ENCOUNTER — SPECIALTY PHARMACY (OUTPATIENT)
Dept: PHARMACY | Facility: HOSPITAL | Age: 74
End: 2024-12-23
Payer: MEDICARE
